# Patient Record
Sex: MALE | Race: WHITE | NOT HISPANIC OR LATINO | Employment: OTHER | URBAN - METROPOLITAN AREA
[De-identification: names, ages, dates, MRNs, and addresses within clinical notes are randomized per-mention and may not be internally consistent; named-entity substitution may affect disease eponyms.]

---

## 2021-05-19 ENCOUNTER — CONVERSION ENCOUNTER (OUTPATIENT)
Dept: FAMILY MEDICINE CLINIC | Facility: CLINIC | Age: 81
End: 2021-05-19

## 2021-05-19 ENCOUNTER — OFFICE VISIT CONVERTED (OUTPATIENT)
Dept: FAMILY MEDICINE CLINIC | Facility: CLINIC | Age: 81
End: 2021-05-19
Attending: FAMILY MEDICINE

## 2021-05-19 LAB
AMPHET UR QL CFM: NEGATIVE
BARBITURATES UR QL: NEGATIVE
BENZODIAZ UR QL SCN: NEGATIVE
CONV AMP/METHAMP UR: NEGATIVE
CONV COCAINE, UR: NEGATIVE
MDMA UR QL SCN: NEGATIVE
METHADONE UR QL SCN: NEGATIVE
OPIATES UR QL SCN: NEGATIVE
OXYCODONE UR QL SCN: NEGATIVE
PCP UR QL: NEGATIVE
THC SERPLBLD CFM-MCNC: NEGATIVE NG/ML

## 2021-05-20 ENCOUNTER — HOSPITAL ENCOUNTER (OUTPATIENT)
Dept: FAMILY MEDICINE CLINIC | Facility: CLINIC | Age: 81
Discharge: HOME | End: 2021-05-20
Attending: FAMILY MEDICINE

## 2021-05-20 LAB
ALBUMIN SERPL-MCNC: 4.3 G/DL (ref 3.5–5)
ALBUMIN/GLOB SERPL: 1.4 {RATIO} (ref 1.4–2.6)
ALP SERPL-CCNC: 78 U/L (ref 56–155)
ALT SERPL-CCNC: 38 U/L (ref 10–40)
ANION GAP SERPL CALC-SCNC: 17 MMOL/L (ref 8–19)
AST SERPL-CCNC: 24 U/L (ref 15–50)
BASOPHILS # BLD AUTO: 0.09 10*3/UL (ref 0–0.2)
BASOPHILS NFR BLD AUTO: 1.4 % (ref 0–3)
BILIRUB SERPL-MCNC: 0.81 MG/DL (ref 0.2–1.3)
BUN SERPL-MCNC: 13 MG/DL (ref 5–25)
BUN/CREAT SERPL: 13 {RATIO} (ref 6–20)
CALCIUM SERPL-MCNC: 10 MG/DL (ref 8.7–10.4)
CHLORIDE SERPL-SCNC: 105 MMOL/L (ref 99–111)
CONV ABS IMM GRAN: 0.01 10*3/UL (ref 0–0.2)
CONV CO2: 26 MMOL/L (ref 22–32)
CONV IMMATURE GRAN: 0.2 % (ref 0–1.8)
CONV TOTAL PROTEIN: 7.3 G/DL (ref 6.3–8.2)
CREAT UR-MCNC: 1.01 MG/DL (ref 0.7–1.2)
DEPRECATED RDW RBC AUTO: 46.8 FL (ref 35.1–43.9)
EOSINOPHIL # BLD AUTO: 0.32 10*3/UL (ref 0–0.7)
EOSINOPHIL # BLD AUTO: 5 % (ref 0–7)
ERYTHROCYTE [DISTWIDTH] IN BLOOD BY AUTOMATED COUNT: 13.2 % (ref 11.6–14.4)
EST. AVERAGE GLUCOSE BLD GHB EST-MCNC: 120 MG/DL
FSH SERPL-ACNC: 13.9 M[IU]/ML
GFR SERPLBLD BASED ON 1.73 SQ M-ARVRAT: >60 ML/MIN/{1.73_M2}
GLOBULIN UR ELPH-MCNC: 3 G/DL (ref 2–3.5)
GLUCOSE SERPL-MCNC: 114 MG/DL (ref 70–99)
HBA1C MFR BLD: 5.8 % (ref 3.5–5.7)
HCT VFR BLD AUTO: 41.5 % (ref 42–52)
HGB BLD-MCNC: 14.2 G/DL (ref 14–18)
LH SERPL-ACNC: 16.2 M[IU]/ML
LYMPHOCYTES # BLD AUTO: 2.18 10*3/UL (ref 1–5)
LYMPHOCYTES NFR BLD AUTO: 33.9 % (ref 20–45)
MCH RBC QN AUTO: 32.6 PG (ref 27–31)
MCHC RBC AUTO-ENTMCNC: 34.2 G/DL (ref 33–37)
MCV RBC AUTO: 95.4 FL (ref 80–96)
MONOCYTES # BLD AUTO: 0.6 10*3/UL (ref 0.2–1.2)
MONOCYTES NFR BLD AUTO: 9.3 % (ref 3–10)
NEUTROPHILS # BLD AUTO: 3.23 10*3/UL (ref 2–8)
NEUTROPHILS NFR BLD AUTO: 50.2 % (ref 30–85)
NRBC CBCN: 0 % (ref 0–0.7)
OSMOLALITY SERPL CALC.SUM OF ELEC: 299 MOSM/KG (ref 273–304)
PLATELET # BLD AUTO: 225 10*3/UL (ref 130–400)
PMV BLD AUTO: 11.7 FL (ref 9.4–12.4)
POTASSIUM SERPL-SCNC: 4.2 MMOL/L (ref 3.5–5.3)
PSA SERPL-MCNC: 1.92 NG/ML (ref 0–4)
RBC # BLD AUTO: 4.35 10*6/UL (ref 4.7–6.1)
SODIUM SERPL-SCNC: 144 MMOL/L (ref 135–147)
T4 FREE SERPL-MCNC: 1.1 NG/DL (ref 0.9–1.8)
TSH SERPL-ACNC: 1.47 M[IU]/L (ref 0.27–4.2)
WBC # BLD AUTO: 6.43 10*3/UL (ref 4.8–10.8)

## 2021-05-21 LAB
25(OH)D3 SERPL-MCNC: 36.8 NG/ML (ref 30–100)
TESTOST SERPL-MCNC: 81 NG/DL (ref 193–740)

## 2021-05-24 ENCOUNTER — HOSPITAL ENCOUNTER (OUTPATIENT)
Dept: GENERAL RADIOLOGY | Facility: HOSPITAL | Age: 81
Discharge: HOME | End: 2021-05-24
Attending: FAMILY MEDICINE

## 2021-06-05 NOTE — H&P
"   History and Physical      Patient Name: Steve Bryson Jr, Jr.   Patient ID: [id number]   Sex: Male   YOB: 1940    Referring Provider: Shamika BARGER    Visit Date: May 19, 2021    Provider: Ok Moore DO   [place] [facility] [place]   [place] Address: [address] [place] [address], [address]  [place], [place]  [id number]   Location Phone: [phone]          Chief Complaint  · establish care  · med refills      History Of Present Illness  Steve Bryson Jr, Jr. is a 80 year old /White male who presents for evaluation and treatment of:      Patient presents today to establish care.  He was previously seen by Dr. Olguin although patient endorses that he was never actually seen by him and just had a telephone visit.  His medical conditions include hypertension, hyperlipidemia, chronic thoracic and low back pain, vitamin D deficiency, BPH, osteoporosis, seasonal allergies, right knee pain/arthritis, as well as a previous right thyroidectomy due to having a \"lump\".  Patient was unsure about the details of this.  Originally told me that it was not involved with his thyroid although I did look the records and he did have a pathology report from 2008 that showed that he had a right thyroid lobe excision showing benign lobe of the thyroid gland demonstrating a follicular adenoma with degenerative changes.  There are also multiple goitrous nodules/nodular hyperplasia with no parathyroid gland tissue identified.  This is per surgical pathology report.  I would like to note that the report is listed in draft format still from 2008.  The surgeon was Dr. Jayden Garcia.  Discharge summary confirms discussion of benign follicular adenoma per Dr. Garcia.  Again, this was in 2008.  I had a chance to review some previous labs.  It appears that his TSH back in 2008 and 2009 was very low.  His most recent thyroid profile that I see on here from 2011 showed his TSH was within normal range at " 1.130.  He does not take any thyroid hormone supplementation.  I did discuss with him having these labs repeated.  His free T4 level has been within normal range.  This also includes normal ranges for free T3.  Labs have shown that he has had elevated hemoglobin levels with a hematocrit as high as 59.6 back in 2013.  Most recently in 2018 the hematocrit was still 53.5 with a hemoglobin being 18.3.  I asked him at this point about testosterone supplementation.  He reports he was taking up until a couple months ago 200 mg of testosterone once a month.  He is unsure but it does not sound like there has been any laboratory follow-up for this.  I discussed with patient my concern due to elevated hemoglobin levels and taking testosterone which she is requesting that I take over care for.  I discussed with him getting labs to further assess this which he is agreeable to doing.  He is also been noted to have high calcium levels with the calcium being 10.6.  He did have a PTH level checked back in 2011 which was 36.1.  Calcium was most recently elevated back in 2011.  Again since a lot of time is passed I discussed with patient need for follow-up labs.  He takes gabapentin for chronic low back pain and thoracic back pain.  He did have a thoracic spine MRI done in 2001 which showed possible recent compressions and lower thoracic spine at T7 and T8 and possible mild compression of the upper thoracic vertebra around the level of T4.  He does need some plain films to follow-up.  He has tolerated the gabapentin and it has helped with his low back pain.  I discussed with him continuing for now.  Parag, UDS, and controlled substance agreement have been reviewed today, they are updated and they are appropriate.  Him and his wife live in [place] half the time due to wanting to avoid the cold weather.  He does get his controlled substances including testosterone and gabapentin filled [facility].  His other  medications he picks up at an [employer] that is close by.  We discussed working on trying to make accommodations for his lifestyle.  He does have osteoporosis.  Most recent assessment of his bone density per hospital record was back in 2005 showing osteoporosis in the lumbar spine as well as osteopenia in the hips.  Discussed need for follow-up.  Depression screening has been reviewed and it is negative.       Past Medical History  BPH; BPH with Urinary Obstruction; Epididymal Nodule; High blood pressure; High cholesterol; Hypogonadism (Testicular Failure)         Past Surgical History  Knee replacement, right; Knee surgery; Mole Removal; Thyroidectomy-partial         Medication List  aspirin oral; atorvastatin 20 mg oral tablet; Calcium 500 oral; Calcium 500 + D 500 mg(1,250mg) -400 unit oral tablet; cetirizine 10 mg oral tablet; finasteride 5 mg oral tablet; Fish Oil oral; Flonase Allergy Relief 50 mcg/actuation nasal spray,suspension; Fosamax 70 mg oral tablet; gabapentin 300 mg oral capsule; lisinopril 20 mg oral tablet; metformin 1,000 mg oral tablet; tamsulosin 0.4 mg oral capsule,extended release 24hr; testosterone cypionate 200 mg/mL intramuscular oil; Viagra 100 mg oral tablet; Vitamin D3 25 mcg (1,000 unit) oral tablet; Zantac oral         Allergy List  NO KNOWN DRUG ALLERGIES       Allergies Reconciled  Family Medical History  *Non Contributory         Social History  Tobacco (Former)         Review of Systems     Gen: Denies any fever, chills, or weight changes  HEENT: Seasonal allergies  Musculoskeletal: Chronic thoracic and low back pain  Endocrine: Fatigue, low sex drive, weakness.  Extremities: Denies edema  Psychiatric: Denies any changes in mood or affect  Neurologic: Denies any deficits  Skin: Denies any rashes       Vitals  Date Time BP Position Site L\R Cuff Size HR RR TEMP (F) WT  HT  BMI kg/m2 BSA m2 O2 Sat FR L/min FiO2 HC       05/19/2021 08:47 /82 Sitting    92 - R  98.4 227lbs  "3oz 5'  8\" 34.54 2.22 97 %            Physical Examination     General: AAO 3, no acute distress, pleasant  HEENT: Normocephalic, atraumatic, no discharge in the eyes, no discharge from the nose, no oropharyngeal erythema or exudates, and TMs intact bilaterally with no erythema, no cervical tenderness or lymphadenopathy  Cardiovascular: Regular rate and rhythm without appreciable murmur  Respiratory: Clear to auscultation bilaterally no RRW  Gastrointestinal: Soft nontender nondistended with bowel sounds present  extremities: Mild, nonpitting edema in the lower extremities  Neurologic: CN II through XII grossly intact   Psychiatric: Normal mood and affect           Results  · In-Office Procedures  o Lab procedure  § IOP - Urine Drug Screen In-House [facility] (70873)   § Amphetamines Ur Ql: Negative   § Barbiturates Ur Ql: Negative   § Buprenorphine+Nor Ur Ql Scn: Negative   § Benzodiaz Ur Ql: Negative   § Cocaine Ur Ql: Negative   § Methadone Ur Ql: Negative   § Methamphet Ur Ql: Negative   § MDMA Ur Ql Scn: Negative   § Opiates Ur Ql: Negative   § Oxycodone Ur Ql: Negative   § PCP Ur Ql: Negative   § THC Ur Ql: Negative   § Temp in Range?: Within/Acceptable   § Control Seen?: Yes       Assessment  · BPH (benign prostatic hyperplasia)     600.00/N40.0  · Osteoporosis     733.00/M81.0  · Thoracic back pain     724.1/M54.6  · Vitamin D deficiency     268.9/E55.9  · Screening for depression     V79.0/Z13.89  · Low back pain     724.2/M54.5  · Hypogonadism in male     257.2/E29.1  · Low testosterone     790.99/R79.89  · Elevated hemoglobin     282.7/D58.2  · Hypercalcemia     275.42/E83.52  · Hyperthyroidism     242.90/E05.90  · Abnormal glucose     790.29/R73.09  · Hypertension     401.9/I10  · Medication monitoring encounter     V58.83/Z51.81  · Follicular adenoma of thyroid gland, right     226/D34  · Prediabetes     790.29/R73.03      Plan  · Orders  o CBC with Auto Diff [facility] (95885) - 282.7/D58.2, V58.83/Z51.81 - " 05/19/2021  o CMP [facility] (12091) - 275.42/E83.52, 401.9/I10, V58.83/Z51.81 - 05/19/2021  o Hgb A1c [facility] (71024) - 790.29/R73.09 - 05/19/2021  o Thyroid Profile (THYII, 06106, 58579) - 242.90/E05.90, V58.83/Z51.81 - 05/19/2021  o Vitamin D (25-Hydroxy) Level (10896) - 268.9/E55.9 - 05/19/2021  o ACO-18: Negative screen for clinical depression using a standardized tool () - V79.0/Z13.89 - 05/19/2021  o ACO-13: Fall Risk Screening with no falls in past year or only one fall without injury in the past year (1101F) - - 05/19/2021  o ACO - Advance Care Plan or Surrogate Decision Maker documented in EMR (1123F) - - 05/19/2021  o ACO-39: Current medications updated and reviewed (1159F, ) - - 05/19/2021  o DEXA Bone Density, 1 or more sites, axial skeleton [facility] (18382) - 733.00/M81.0 - 05/19/2021  o Xray thoracic spine 3 views [facility] (62237) - 724.1/M54.6 - 05/19/2021  o Lumbar Spine xray complete [facility] (98441) - 724.2/M54.5 - 05/19/2021  o PSA ultrasensitive DIAGNOSTIC [facility] (36917) - 790.99/R79.89, 282.7/D58.2, 600.00/N40.0, V58.83/Z51.81 - 05/19/2021  o SHBG (sex hormone binding globulin) (79154) - 257.2/E29.1, 790.99/R79.89 - 05/19/2021  o LH (01072) - 257.2/E29.1, 790.99/R79.89, V58.83/Z51.81 - 05/19/2021  o FSH level (66096) - 257.2/E29.1, 790.99/R79.89, V58.83/Z51.81 - 05/19/2021  o PSA ultrasensitive DIAGNOSTIC [facility] (96624) - 790.99/R79.89, 282.7/D58.2, V58.83/Z51.81 - 05/19/2021  · Medications  o atorvastatin 20 mg oral tablet   SIG: take 1 tablet (20 mg) by oral route once daily   DISP: (90) Tablet with 3 refills  Prescribed on 05/19/2021     o Calcium 500 + D 500 mg(1,250mg) -400 unit oral tablet   SIG: take 1 tablet by oral route QD   DISP: (90) Tablet with 3 refills  Prescribed on 05/19/2021     o cetirizine 10 mg oral tablet   SIG: take 1 tablet (10 mg) by oral route once daily   DISP: (90) Tablet with 3 refills  Prescribed on 05/19/2021     o Flonase Allergy Relief 50 mcg/actuation  nasal spray,suspension   SIG: spray 2 sprays in each nostril by intranasal route once daily   DISP: (3) Bottle with 3 refills  Prescribed on 05/19/2021     o Fosamax 70 mg oral tablet   SIG: take 1 tablet PO once weekly in the morning, at least 30 min before first food, beverage, or medication of day   DISP: (13) Tablet with 3 refills  Prescribed on 05/19/2021     o gabapentin 600 mg oral tablet   SIG: take 1 tablet (600 mg) by oral route 3 times per day   DISP: (90) Tablet with 1 refills  Prescribed on 05/19/2021     o lisinopril 20 mg oral tablet   SIG: take 1 tablet (20 mg) by oral route once daily   DISP: (90) Tablet with 3 refills  Prescribed on 05/19/2021     o metformin 1,000 mg oral tablet   SIG: take 1 tablet (1,000 mg) by oral route 2 times per day with morning and evening meals   DISP: (180) Tablet with 3 refills  Prescribed on 05/19/2021     o Vitamin D3 25 mcg (1,000 unit) oral tablet   SIG: take 1 tablet by oral route QD   DISP: (90) Tablet with 3 refills  Prescribed on 05/19/2021     o aspirin 81 mg oral tablet,delayed release (DR/EC)   SIG: take 1 tablet (81 mg) by oral route once daily   DISP: (90) Tablet with 3 refills  Adjusted on 05/19/2021     o finasteride 5 mg oral tablet   SIG: take 1 tablet (5 mg) by oral route once daily for 90 days   DISP: (90) Tablet with 3 refills  Refilled on 05/19/2021     o tamsulosin 0.4 mg oral capsule,extended release 24hr   SIG: take 1 capsule (0.4 mg) by oral route once daily 1/2 hour following the same meal each day for 90 days   DISP: (90) Capsule with 3 refills  Refilled on 05/19/2021     o Zantac oral   SIG: ---   DISP: # 0 with 0 refills  Discontinued on 05/19/2021     · Instructions  o Depression Screen completed and scanned into the EMR under the designated folder within the patient's documents.  o Today's PHQ-9 result is _0__  o Discussed the risk and benefits of the use of controlled substances with the patient, including the risk of tolerance and drug  dependence. The patient has been counseled on the need to have an exit strategy, including potentially discontinuing the use of controlled substances.  o Patient was educated/instructed on their diagnosis, treatment and medications prior to discharge from the clinic today.  o Patient instructed to seek medical attention urgently for new or worsening symptoms.  o Call the office with any concerns or questions.  o I have numerous unanswered questions today that will be further clarified once patient has had labs done. He does have an elevated hemoglobin. I will not prescribe him testosterone at this time due to increased risk of thrombotic events and stroke. Patient verbalized understanding. Plan to reassess him in 1 month after labs have been completed. Gabapentin has been refilled today. Parag, UDS, and controlled subs agreement have been reviewed and they are appropriate. We will get updated thoracic and lumbar plain films. I will also get a DEXA scan to reassess osteoporosis. Plan to also reassess previous lab abnormalities including abnormalities with TSH as well as calcium. Medications have been refilled today. Patient instructed to call the office with any questions or concerns. Patient verbalized understanding and is in agreement with treatment and management plan. I spent 47 minutes on medical discussion with patient.  · Disposition  o Follow Up in 1 month.            Electronically Signed by: Ok Moore DO -Author on May 19, 2021 12:22:56 PM

## 2021-06-21 ENCOUNTER — TELEPHONE (OUTPATIENT)
Dept: FAMILY MEDICINE CLINIC | Facility: CLINIC | Age: 81
End: 2021-06-21

## 2021-06-21 NOTE — TELEPHONE ENCOUNTER
Called patient and he verified that the paper is a letter than needs to state that he can't do jury duty due to permanent health conditions. Please advise.

## 2021-06-21 NOTE — TELEPHONE ENCOUNTER
Caller: Steve Bryson    Relationship: Self    Best call back number: [phone]    What form or medical record are you requesting: PAPER IN ORDER TO NOT HAVE TO GO TO [employer] DUE TO HIS HEALTH CONDITIONS  Who is requesting this form or medical record from you: [employer]    How would you like to receive the form or medical records (pick-up, mail, fax):   If fax, what is the fax number:  If mail, what is the address:   If pick-up, provide patient with address and location details    Timeframe paperwork needed: BY Friday 06/25    Additional notes: PATIENT IS WANTING TO KNOW IF A PAPER CAN BE COMPLETED SO HE DOES NOT HAVE TO ATTEND [employer]. PATIENTS WIFE HAS AN APPOINTMENT TOMORROW AND PATIENT IS WANTING TO KNOW IF IT IS POSSIBLE TO PICK THIS UP THEN. PLEASE ADVISE

## 2021-06-21 NOTE — TELEPHONE ENCOUNTER
Please contact patient and inform that I have written a letter that is available for him to  upfront.  Thank you.

## 2021-06-28 ENCOUNTER — OFFICE VISIT (OUTPATIENT)
Dept: FAMILY MEDICINE CLINIC | Facility: CLINIC | Age: 81
End: 2021-06-28

## 2021-06-28 VITALS
DIASTOLIC BLOOD PRESSURE: 76 MMHG | TEMPERATURE: 98 F | OXYGEN SATURATION: 96 % | HEART RATE: 97 BPM | BODY MASS INDEX: 33.8 KG/M2 | HEIGHT: 68 IN | SYSTOLIC BLOOD PRESSURE: 122 MMHG | WEIGHT: 223 LBS

## 2021-06-28 DIAGNOSIS — E89.0 HISTORY OF PARTIAL THYROIDECTOMY: ICD-10-CM

## 2021-06-28 DIAGNOSIS — M54.50 LOW BACK PAIN, UNSPECIFIED BACK PAIN LATERALITY, UNSPECIFIED CHRONICITY, UNSPECIFIED WHETHER SCIATICA PRESENT: ICD-10-CM

## 2021-06-28 DIAGNOSIS — R79.89 LOW TESTOSTERONE IN MALE: ICD-10-CM

## 2021-06-28 DIAGNOSIS — M81.0 OSTEOPOROSIS, UNSPECIFIED OSTEOPOROSIS TYPE, UNSPECIFIED PATHOLOGICAL FRACTURE PRESENCE: ICD-10-CM

## 2021-06-28 DIAGNOSIS — G89.29 CHRONIC THORACIC BACK PAIN, UNSPECIFIED BACK PAIN LATERALITY: ICD-10-CM

## 2021-06-28 DIAGNOSIS — S32.040D COMPRESSION FRACTURE OF L4 VERTEBRA WITH ROUTINE HEALING, SUBSEQUENT ENCOUNTER: ICD-10-CM

## 2021-06-28 DIAGNOSIS — Z51.81 MEDICATION MONITORING ENCOUNTER: ICD-10-CM

## 2021-06-28 DIAGNOSIS — M54.6 CHRONIC THORACIC BACK PAIN, UNSPECIFIED BACK PAIN LATERALITY: ICD-10-CM

## 2021-06-28 DIAGNOSIS — R73.03 PREDIABETES: ICD-10-CM

## 2021-06-28 DIAGNOSIS — E29.1 HYPOGONADISM IN MALE: Primary | ICD-10-CM

## 2021-06-28 DIAGNOSIS — S22.060G COMPRESSION FRACTURE OF T8 VERTEBRA WITH DELAYED HEALING, SUBSEQUENT ENCOUNTER: ICD-10-CM

## 2021-06-28 DIAGNOSIS — M89.9 BONE LESION: ICD-10-CM

## 2021-06-28 PROBLEM — S32.000D COMPRESSION FRACTURE OF LUMBAR VERTEBRA WITH ROUTINE HEALING: Status: ACTIVE | Noted: 2021-06-28

## 2021-06-28 PROBLEM — S22.000G COMPRESSION FRACTURE OF THORACIC VERTEBRA WITH DELAYED HEALING: Status: ACTIVE | Noted: 2021-06-28

## 2021-06-28 PROCEDURE — 99214 OFFICE O/P EST MOD 30 MIN: CPT | Performed by: FAMILY MEDICINE

## 2021-06-28 RX ORDER — RANITIDINE 150 MG/1
TABLET ORAL
COMMUNITY
End: 2021-06-28

## 2021-06-28 RX ORDER — FLUTICASONE PROPIONATE 50 MCG
2 SPRAY, SUSPENSION (ML) NASAL DAILY
Qty: 48 G | Refills: 3 | Status: SHIPPED | OUTPATIENT
Start: 2021-06-28 | End: 2022-04-18 | Stop reason: SDUPTHER

## 2021-06-28 RX ORDER — SILDENAFIL 50 MG/1
TABLET, FILM COATED ORAL
COMMUNITY
End: 2021-06-28

## 2021-06-28 RX ORDER — HYDROCHLOROTHIAZIDE 12.5 MG/1
12.5 TABLET ORAL DAILY
COMMUNITY
End: 2022-04-18 | Stop reason: SDUPTHER

## 2021-06-28 RX ORDER — TAMSULOSIN HYDROCHLORIDE 0.4 MG/1
1 CAPSULE ORAL NIGHTLY
COMMUNITY
End: 2021-08-09 | Stop reason: SDUPTHER

## 2021-06-28 RX ORDER — TESTOSTERONE 10 MG/.5G
40 GEL, METERED TOPICAL DAILY
Qty: 60 G | Refills: 2 | Status: SHIPPED | OUTPATIENT
Start: 2021-06-28 | End: 2021-08-11 | Stop reason: SDUPTHER

## 2021-06-28 RX ORDER — GABAPENTIN 600 MG/1
TABLET ORAL
COMMUNITY
Start: 2021-06-16 | End: 2021-07-03 | Stop reason: HOSPADM

## 2021-06-28 RX ORDER — ASPIRIN 81 MG/1
TABLET, CHEWABLE ORAL
COMMUNITY
End: 2021-06-28

## 2021-06-28 RX ORDER — ALENDRONATE SODIUM 70 MG/1
TABLET ORAL
COMMUNITY
End: 2021-06-28 | Stop reason: SDUPTHER

## 2021-06-28 RX ORDER — ALENDRONATE SODIUM 70 MG/1
70 TABLET ORAL
Qty: 13 TABLET | Refills: 3 | Status: SHIPPED | OUTPATIENT
Start: 2021-06-28 | End: 2022-04-18 | Stop reason: SDUPTHER

## 2021-06-28 RX ORDER — ASPIRIN 81 MG/1
TABLET ORAL
COMMUNITY
End: 2021-06-28 | Stop reason: SDUPTHER

## 2021-06-28 RX ORDER — ATORVASTATIN CALCIUM 20 MG/1
TABLET, FILM COATED ORAL
COMMUNITY
End: 2021-06-28 | Stop reason: SDUPTHER

## 2021-06-28 RX ORDER — CETIRIZINE HYDROCHLORIDE 10 MG/1
10 TABLET ORAL DAILY
Qty: 90 TABLET | Refills: 3 | Status: SHIPPED | OUTPATIENT
Start: 2021-06-28 | End: 2022-04-18 | Stop reason: SDUPTHER

## 2021-06-28 RX ORDER — LISINOPRIL 20 MG/1
20 TABLET ORAL DAILY
COMMUNITY
End: 2021-08-06 | Stop reason: SDUPTHER

## 2021-06-28 RX ORDER — TESTOSTERONE CYPIONATE 200 MG/ML
INJECTION, SOLUTION INTRAMUSCULAR
COMMUNITY
End: 2021-06-28

## 2021-06-28 RX ORDER — OMEGA-3 FATTY ACIDS CAP DELAYED RELEASE 1000 MG 1000 MG
CAPSULE DELAYED RELEASE ORAL
COMMUNITY
End: 2021-06-28

## 2021-06-28 RX ORDER — FAMOTIDINE 20 MG
TABLET ORAL
COMMUNITY
End: 2021-07-03 | Stop reason: HOSPADM

## 2021-06-28 RX ORDER — ATORVASTATIN CALCIUM 20 MG/1
10 TABLET, FILM COATED ORAL NIGHTLY
Qty: 90 TABLET | Refills: 3 | Status: SHIPPED | OUTPATIENT
Start: 2021-06-28 | End: 2021-07-03 | Stop reason: HOSPADM

## 2021-06-28 RX ORDER — ASPIRIN 81 MG/1
81 TABLET ORAL DAILY
Qty: 90 TABLET | Refills: 3 | Status: SHIPPED | OUTPATIENT
Start: 2021-06-28 | End: 2021-08-30 | Stop reason: SDUPTHER

## 2021-06-28 RX ORDER — GABAPENTIN 600 MG/1
600 TABLET ORAL 3 TIMES DAILY
COMMUNITY
End: 2021-08-06 | Stop reason: SDUPTHER

## 2021-06-28 RX ORDER — CETIRIZINE HYDROCHLORIDE 10 MG/1
TABLET ORAL
COMMUNITY
End: 2021-06-28 | Stop reason: SDUPTHER

## 2021-06-28 RX ORDER — INFLUENZA A VIRUS A/MICHIGAN/45/2015 X-275 (H1N1) ANTIGEN (FORMALDEHYDE INACTIVATED), INFLUENZA A VIRUS A/SINGAPORE/INFIMH-16-0019/2016 IVR-186 (H3N2) ANTIGEN (FORMALDEHYDE INACTIVATED), INFLUENZA B VIRUS B/PHUKET/3073/2013 ANTIGEN (FORMALDEHYDE INACTIVATED), AND INFLUENZA B VIRUS B/MARYLAND/15/2016 BX-69A ANTIGEN (FORMALDEHYDE INACTIVATED) 60; 60; 60; 60 UG/.7ML; UG/.7ML; UG/.7ML; UG/.7ML
INJECTION, SUSPENSION INTRAMUSCULAR
COMMUNITY
End: 2021-07-03 | Stop reason: HOSPADM

## 2021-06-28 RX ORDER — FLUTICASONE PROPIONATE 50 MCG
SPRAY, SUSPENSION (ML) NASAL
COMMUNITY
End: 2021-06-28 | Stop reason: SDUPTHER

## 2021-06-28 RX ORDER — MELATONIN
COMMUNITY
Start: 2021-05-19 | End: 2021-07-03 | Stop reason: HOSPADM

## 2021-06-28 RX ORDER — SULFAMETHOXAZOLE AND TRIMETHOPRIM 800; 160 MG/1; MG/1
TABLET ORAL
COMMUNITY
End: 2021-06-28

## 2021-06-28 RX ORDER — FINASTERIDE 5 MG/1
5 TABLET, FILM COATED ORAL DAILY
COMMUNITY
End: 2021-08-06 | Stop reason: SDUPTHER

## 2021-06-28 RX ORDER — MULTIVITAMIN WITH IRON
TABLET ORAL
COMMUNITY
End: 2021-06-28

## 2021-06-28 RX ORDER — ANASTROZOLE 1 MG/1
TABLET ORAL
COMMUNITY
End: 2021-06-28

## 2021-06-28 RX ORDER — RANITIDINE 150 MG/1
TABLET ORAL EVERY 12 HOURS SCHEDULED
COMMUNITY
End: 2021-06-28

## 2021-06-28 NOTE — PROGRESS NOTES
Chief Complaint  Low testosterone  Discuss labs  Thoracic and low back pain    Subjective          Steve Bryson presents to [facility]  History of Present Illness  Patient presents today to follow-up from his initial office visit when I saw him on 5/19/2021.  He has had issues with low testosterone but has been off of testosterone the past couple months.  He does have issues with fatigue and low sex drive and decreased muscle mass/tone.  He reports previously taking 200 mg of testosterone monthly.  He previously has had some high levels of hemoglobin at 18.3 and hematocrit at 53.5.  He had recent labs done which showed a hematocrit of 41.5 and hemoglobin of 14.2.  I did check his testosterone level which was low at 81.  Vitamin D level was adequate.  CMP showed slightly elevated glucose at 114.  PSA was within normal limits at 1.92.  His thyroid profile was within normal limits.  FSH slightly elevated at 13.9 which would be expected given history of hypogonadism.  Upper limits being 12.4.  LH was within normal limits at 16.2.  His A1c was 5.8%.  He reports not being formally diagnosed with diabetes but he has been told previously that he has prediabetic levels.  Last time we spoke we discussed issues with thoracic and low back pain.  He does take gabapentin which does help out for this.  He does have osteoporosis and takes Fortesta.  He does need a DEXA scan so I have ordered this as well.  He needs medications refilled today.  Unfortunately the thoracic spine x-ray showed probably old moderate to severe compression fractures at T8-T9.  He also has a moderate to severe compression fracture of indeterminate age at L4.  It was also noted on this x-ray that he had a focal area of increased density overlying the lateral aspect of the right iliac wing measuring 5.2 cm x 3.6 in size of uncertain etiology which might be an artifact or calcification.  It was recommended to correlate  "with any previous films of the abdomen or pelvis or CT.  Unfortunately there are no prior imaging to compare at this time.  Plan to get a follow-up x-ray of the pelvis to further evaluate.  I discussed starting the patient on testosterone.  He previously was taking the injection 200 mg once a month.  Discussed with him that this would be spaced out a little bit too much.  He previously was on once weekly however his former primary care preferred once a month testosterone over weekly or biweekly.  I discussed starting him on Fortesta.  Risk and benefits discussed including increased risk of the hematocrit rising and increased risk of stroke.  Patient verbalized understanding.  Objective   Vital Signs:   /76   Pulse 97   Temp 98 °F (36.7 °C)   Ht 172.7 cm (68\")   Wt 101 kg (223 lb)   SpO2 96%   BMI 33.91 kg/m²     Physical Exam   General: AAO ×3, no acute distress, pleasant  HEENT: Normocephalic, atraumatic  Cardiovascular: Regular rate and rhythm without appreciable murmur  Respiratory: Clear to auscultation bilaterally no RRW  Gastrointestinal: Soft nontender nondistended with bowel sounds present  extremities: No edema  Neurologic: CN II through XII grossly intact   Psychiatric: Normal mood and affect  Result Review :                 Assessment and Plan    Diagnoses and all orders for this visit:    1. Hypogonadism in male (Primary)  -     Testosterone 10 MG/ACT (2%) gel; Place 40 mg on the skin as directed by provider Daily.  Dispense: 60 g; Refill: 2  -     CBC & Differential; Future  -     Testosterone; Future    2. Osteoporosis, unspecified osteoporosis type, unspecified pathological fracture presence  -     DEXA Bone Density Axial; Future    3. History of partial thyroidectomy    4. Prediabetes    5. Low testosterone in male    6. Chronic thoracic back pain, unspecified back pain laterality  -     Ambulatory Referral to Neurosurgery    7. Low back pain, unspecified back pain laterality, unspecified " chronicity, unspecified whether sciatica present  -     Ambulatory Referral to Neurosurgery    8. Compression fracture of T8 vertebra with delayed healing, subsequent encounter  -     Ambulatory Referral to Neurosurgery    9. Compression fracture of L4 vertebra with routine healing, subsequent encounter  -     Ambulatory Referral to Neurosurgery    10. Medication monitoring encounter  -     CBC & Differential; Future  -     Testosterone; Future    11. Bone lesion  -     XR Pelvis 3+ View; Future    Other orders  -     aspirin (aspirin) 81 MG EC tablet; Take 1 tablet by mouth Daily.  Dispense: 90 tablet; Refill: 3  -     cetirizine (zyrTEC) 10 MG tablet; Take 1 tablet by mouth Daily.  Dispense: 90 tablet; Refill: 3  -     fluticasone (FLONASE) 50 MCG/ACT nasal spray; 2 sprays into the nostril(s) as directed by provider Daily.  Dispense: 48 g; Refill: 3  -     atorvastatin (LIPITOR) 20 MG tablet; Take 0.5 tablets by mouth Every Night.  Dispense: 90 tablet; Refill: 3  -     alendronate (FOSAMAX) 70 MG tablet; Take 1 tablet by mouth Every 7 (Seven) Days.  Dispense: 13 tablet; Refill: 3    Discussed starting him on Fortesta.  Risk and benefits were discussed with patient at length.  Parag, UDS, and controlled subs agreement have been reviewed and they are appropriate.  I will set him up to see neurosurgery for thoracic and low back pain which are complicated by compression fractures.  He does have a calcification or possible artifact on the right iliac wing that measures 5.2 x 6.3 cm of uncertain etiology which may be artifact or calcification per radiology report.  I will get an x-ray of the pelvis to further evaluate.     See note for indication of controlled substance.  Parag and drug screen have been reviewed.  Controlled substance agreement signed and scanned into chart.  After discussion of risk and benefits of medication, I have determined the patient is suitable for Rx while demonstrating the ability to safely  follow and administer the medication plan.  Patient understands expectation that medication directions cannot be adjusted without a providers written approval.      I spent 39 minutes caring for Steve on this date of service. This time includes time spent by me in the following activities:reviewing tests, obtaining and/or reviewing a separately obtained history, counseling and educating the patient/family/caregiver, ordering medications, tests, or procedures, documenting information in the medical record and care coordination  Follow Up   Return in about 2 months (around 8/28/2021) for follow up on low testosterone.  Patient was given instructions and counseling regarding his condition or for health maintenance advice. Please see specific information pulled into the AVS if appropriate.

## 2021-07-02 ENCOUNTER — HOSPITAL ENCOUNTER (INPATIENT)
Facility: HOSPITAL | Age: 81
LOS: 1 days | Discharge: HOME | End: 2021-07-03
Attending: EMERGENCY MEDICINE | Admitting: INTERNAL MEDICINE

## 2021-07-02 ENCOUNTER — APPOINTMENT (OUTPATIENT)
Dept: GENERAL RADIOLOGY | Facility: HOSPITAL | Age: 81
End: 2021-07-02

## 2021-07-02 ENCOUNTER — APPOINTMENT (OUTPATIENT)
Dept: CT IMAGING | Facility: HOSPITAL | Age: 81
End: 2021-07-02

## 2021-07-02 DIAGNOSIS — I48.91 ATRIAL FIBRILLATION, NEW ONSET (HCC): Primary | ICD-10-CM

## 2021-07-02 DIAGNOSIS — R55 SYNCOPE, UNSPECIFIED SYNCOPE TYPE: ICD-10-CM

## 2021-07-02 LAB
ALBUMIN SERPL-MCNC: 4 G/DL (ref 3.5–5.2)
ALBUMIN/GLOB SERPL: 1.4 G/DL
ALP SERPL-CCNC: 85 U/L (ref 39–117)
ALT SERPL W P-5'-P-CCNC: 32 U/L (ref 1–41)
ANION GAP SERPL CALCULATED.3IONS-SCNC: 11.6 MMOL/L (ref 5–15)
AST SERPL-CCNC: 22 U/L (ref 1–40)
BACTERIA UR QL AUTO: ABNORMAL /HPF
BASOPHILS # BLD AUTO: 0.08 10*3/MM3 (ref 0–0.2)
BASOPHILS NFR BLD AUTO: 0.8 % (ref 0–1.5)
BILIRUB SERPL-MCNC: 0.6 MG/DL (ref 0–1.2)
BILIRUB UR QL STRIP: NEGATIVE
BUN SERPL-MCNC: 13 MG/DL (ref 8–23)
BUN/CREAT SERPL: 14 (ref 7–25)
CALCIUM SPEC-SCNC: 10.2 MG/DL (ref 8.6–10.5)
CHLORIDE SERPL-SCNC: 104 MMOL/L (ref 98–107)
CLARITY UR: CLEAR
CO2 SERPL-SCNC: 23.4 MMOL/L (ref 22–29)
COLOR UR: YELLOW
CREAT SERPL-MCNC: 0.93 MG/DL (ref 0.76–1.27)
DEPRECATED RDW RBC AUTO: 42.7 FL (ref 37–54)
EOSINOPHIL # BLD AUTO: 0.44 10*3/MM3 (ref 0–0.4)
EOSINOPHIL NFR BLD AUTO: 4.2 % (ref 0.3–6.2)
ERYTHROCYTE [DISTWIDTH] IN BLOOD BY AUTOMATED COUNT: 12.4 % (ref 12.3–15.4)
GFR SERPL CREATININE-BSD FRML MDRD: 79 ML/MIN/1.73
GLOBULIN UR ELPH-MCNC: 2.9 GM/DL
GLUCOSE BLDC GLUCOMTR-MCNC: 132 MG/DL (ref 70–130)
GLUCOSE SERPL-MCNC: 145 MG/DL (ref 65–99)
GLUCOSE UR STRIP-MCNC: NEGATIVE MG/DL
HCT VFR BLD AUTO: 39.5 % (ref 37.5–51)
HGB BLD-MCNC: 13.7 G/DL (ref 13–17.7)
HGB UR QL STRIP.AUTO: NEGATIVE
HOLD SPECIMEN: NORMAL
HOLD SPECIMEN: NORMAL
HYALINE CASTS UR QL AUTO: ABNORMAL /LPF
IMM GRANULOCYTES # BLD AUTO: 0.03 10*3/MM3 (ref 0–0.05)
IMM GRANULOCYTES NFR BLD AUTO: 0.3 % (ref 0–0.5)
KETONES UR QL STRIP: NEGATIVE
LEUKOCYTE ESTERASE UR QL STRIP.AUTO: NEGATIVE
LYMPHOCYTES # BLD AUTO: 1.51 10*3/MM3 (ref 0.7–3.1)
LYMPHOCYTES NFR BLD AUTO: 14.3 % (ref 19.6–45.3)
MAGNESIUM SERPL-MCNC: 1.7 MG/DL (ref 1.6–2.4)
MCH RBC QN AUTO: 32.4 PG (ref 26.6–33)
MCHC RBC AUTO-ENTMCNC: 34.7 G/DL (ref 31.5–35.7)
MCV RBC AUTO: 93.4 FL (ref 79–97)
MONOCYTES # BLD AUTO: 0.79 10*3/MM3 (ref 0.1–0.9)
MONOCYTES NFR BLD AUTO: 7.5 % (ref 5–12)
NEUTROPHILS NFR BLD AUTO: 7.68 10*3/MM3 (ref 1.7–7)
NEUTROPHILS NFR BLD AUTO: 72.9 % (ref 42.7–76)
NITRITE UR QL STRIP: NEGATIVE
NRBC BLD AUTO-RTO: 0 /100 WBC (ref 0–0.2)
PH UR STRIP.AUTO: 5.5 [PH] (ref 5–8)
PLATELET # BLD AUTO: 192 10*3/MM3 (ref 140–450)
PMV BLD AUTO: 10.9 FL (ref 6–12)
POTASSIUM SERPL-SCNC: 3.6 MMOL/L (ref 3.5–5.2)
PROT SERPL-MCNC: 6.9 G/DL (ref 6–8.5)
PROT UR QL STRIP: ABNORMAL
RBC # BLD AUTO: 4.23 10*6/MM3 (ref 4.14–5.8)
RBC # UR: ABNORMAL /HPF
REF LAB TEST METHOD: ABNORMAL
SODIUM SERPL-SCNC: 139 MMOL/L (ref 136–145)
SP GR UR STRIP: 1.02 (ref 1–1.03)
SQUAMOUS #/AREA URNS HPF: ABNORMAL /HPF
TROPONIN T SERPL-MCNC: <0.01 NG/ML (ref 0–0.03)
UROBILINOGEN UR QL STRIP: ABNORMAL
WBC # BLD AUTO: 10.53 10*3/MM3 (ref 3.4–10.8)
WBC UR QL AUTO: ABNORMAL /HPF
WHOLE BLOOD HOLD SPECIMEN: NORMAL

## 2021-07-02 PROCEDURE — 83735 ASSAY OF MAGNESIUM: CPT

## 2021-07-02 PROCEDURE — 80053 COMPREHEN METABOLIC PANEL: CPT

## 2021-07-02 PROCEDURE — 99284 EMERGENCY DEPT VISIT MOD MDM: CPT

## 2021-07-02 PROCEDURE — 93005 ELECTROCARDIOGRAM TRACING: CPT | Performed by: EMERGENCY MEDICINE

## 2021-07-02 PROCEDURE — 84484 ASSAY OF TROPONIN QUANT: CPT

## 2021-07-02 PROCEDURE — 93005 ELECTROCARDIOGRAM TRACING: CPT

## 2021-07-02 PROCEDURE — 85025 COMPLETE CBC W/AUTO DIFF WBC: CPT

## 2021-07-02 PROCEDURE — 71045 X-RAY EXAM CHEST 1 VIEW: CPT

## 2021-07-02 PROCEDURE — 93010 ELECTROCARDIOGRAM REPORT: CPT | Performed by: SPECIALIST

## 2021-07-02 PROCEDURE — 70450 CT HEAD/BRAIN W/O DYE: CPT

## 2021-07-02 PROCEDURE — 81001 URINALYSIS AUTO W/SCOPE: CPT | Performed by: EMERGENCY MEDICINE

## 2021-07-02 PROCEDURE — 82962 GLUCOSE BLOOD TEST: CPT

## 2021-07-02 RX ORDER — SODIUM CHLORIDE 0.9 % (FLUSH) 0.9 %
10 SYRINGE (ML) INJECTION AS NEEDED
Status: DISCONTINUED | OUTPATIENT
Start: 2021-07-02 | End: 2021-07-03 | Stop reason: HOSPADM

## 2021-07-02 RX ADMIN — SODIUM CHLORIDE 1000 ML: 9 INJECTION, SOLUTION INTRAVENOUS at 23:27

## 2021-07-03 ENCOUNTER — APPOINTMENT (OUTPATIENT)
Dept: CARDIOLOGY | Facility: HOSPITAL | Age: 81
End: 2021-07-03

## 2021-07-03 ENCOUNTER — READMISSION MANAGEMENT (OUTPATIENT)
Dept: CALL CENTER | Facility: HOSPITAL | Age: 81
End: 2021-07-03

## 2021-07-03 VITALS
OXYGEN SATURATION: 98 % | DIASTOLIC BLOOD PRESSURE: 82 MMHG | HEIGHT: 66 IN | RESPIRATION RATE: 20 BRPM | BODY MASS INDEX: 36.67 KG/M2 | TEMPERATURE: 97.5 F | WEIGHT: 228.18 LBS | SYSTOLIC BLOOD PRESSURE: 138 MMHG | HEART RATE: 91 BPM

## 2021-07-03 PROBLEM — I48.91 A-FIB: Status: ACTIVE | Noted: 2021-07-03

## 2021-07-03 LAB
ANION GAP SERPL CALCULATED.3IONS-SCNC: 11.6 MMOL/L (ref 5–15)
ASCENDING AORTA: 3.4 CM
BASOPHILS # BLD AUTO: 0.08 10*3/MM3 (ref 0–0.2)
BASOPHILS NFR BLD AUTO: 0.8 % (ref 0–1.5)
BH CV ECHO MEAS - AO ROOT DIAM: 3 CM
BH CV ECHO MEAS - EDV(MOD-SP2): 59 ML
BH CV ECHO MEAS - EDV(MOD-SP4): 71 ML
BH CV ECHO MEAS - EF(MOD-BP): 56 %
BH CV ECHO MEAS - ESV(MOD-SP2): 26.3 ML
BH CV ECHO MEAS - ESV(MOD-SP4): 32.5 ML
BH CV ECHO MEAS - IVSD: 1.1 CM
BH CV ECHO MEAS - LA DIMENSION(2D): 4.1 CM
BH CV ECHO MEAS - LAT PEAK E' VEL: 12.2 CM/SEC
BH CV ECHO MEAS - LVIDD: 3.4 CM
BH CV ECHO MEAS - LVIDS: 2.4 CM
BH CV ECHO MEAS - LVOT DIAM: 2 CM
BH CV ECHO MEAS - LVPWD: 1.2 CM
BH CV ECHO MEAS - MED PEAK E' VEL: 7.7 CM/SEC
BH CV ECHO MEAS - MV A MAX VEL: 38 CM/SEC
BH CV ECHO MEAS - MV DEC TIME: 203 MSEC
BH CV ECHO MEAS - MV E MAX VEL: 70 CM/SEC
BH CV ECHO MEAS - MV E/A: 1.8
BH CV ECHO MEAS - RAP SYSTOLE: 3 MMHG
BH CV ECHO MEAS - RVDD: 3 CM
BH CV ECHO MEAS - RVSP: 35 MMHG
BH CV ECHO MEAS - TR MAX PG: 32 MMHG
BH CV ECHO MEAS - TR MAX VEL: 281 CM/SEC
BH CV ECHO MEASUREMENTS AVERAGE E/E' RATIO: 7.04
BUN SERPL-MCNC: 11 MG/DL (ref 8–23)
BUN/CREAT SERPL: 11.8 (ref 7–25)
CALCIUM SPEC-SCNC: 10.4 MG/DL (ref 8.6–10.5)
CHLORIDE SERPL-SCNC: 105 MMOL/L (ref 98–107)
CO2 SERPL-SCNC: 25.4 MMOL/L (ref 22–29)
CREAT SERPL-MCNC: 0.93 MG/DL (ref 0.76–1.27)
DEPRECATED RDW RBC AUTO: 43.7 FL (ref 37–54)
EOSINOPHIL # BLD AUTO: 0.34 10*3/MM3 (ref 0–0.4)
EOSINOPHIL NFR BLD AUTO: 3.4 % (ref 0.3–6.2)
ERYTHROCYTE [DISTWIDTH] IN BLOOD BY AUTOMATED COUNT: 12.5 % (ref 12.3–15.4)
GFR SERPL CREATININE-BSD FRML MDRD: 79 ML/MIN/1.73
GLUCOSE SERPL-MCNC: 120 MG/DL (ref 65–99)
HCT VFR BLD AUTO: 40.9 % (ref 37.5–51)
HGB BLD-MCNC: 14.1 G/DL (ref 13–17.7)
IMM GRANULOCYTES # BLD AUTO: 0.03 10*3/MM3 (ref 0–0.05)
IMM GRANULOCYTES NFR BLD AUTO: 0.3 % (ref 0–0.5)
IVRT: 85 MSEC
LEFT ATRIUM VOLUME INDEX: 27 ML/M2
LYMPHOCYTES # BLD AUTO: 1.97 10*3/MM3 (ref 0.7–3.1)
LYMPHOCYTES NFR BLD AUTO: 19.5 % (ref 19.6–45.3)
MAGNESIUM SERPL-MCNC: 2 MG/DL (ref 1.6–2.4)
MAXIMAL PREDICTED HEART RATE: 142 BPM
MCH RBC QN AUTO: 32.9 PG (ref 26.6–33)
MCHC RBC AUTO-ENTMCNC: 34.5 G/DL (ref 31.5–35.7)
MCV RBC AUTO: 95.3 FL (ref 79–97)
MONOCYTES # BLD AUTO: 0.99 10*3/MM3 (ref 0.1–0.9)
MONOCYTES NFR BLD AUTO: 9.8 % (ref 5–12)
NEUTROPHILS NFR BLD AUTO: 6.67 10*3/MM3 (ref 1.7–7)
NEUTROPHILS NFR BLD AUTO: 66.2 % (ref 42.7–76)
NRBC BLD AUTO-RTO: 0 /100 WBC (ref 0–0.2)
PLATELET # BLD AUTO: 207 10*3/MM3 (ref 140–450)
PMV BLD AUTO: 11 FL (ref 6–12)
POTASSIUM SERPL-SCNC: 3.7 MMOL/L (ref 3.5–5.2)
RBC # BLD AUTO: 4.29 10*6/MM3 (ref 4.14–5.8)
SODIUM SERPL-SCNC: 142 MMOL/L (ref 136–145)
STRESS TARGET HR: 121 BPM
TROPONIN T SERPL-MCNC: <0.01 NG/ML (ref 0–0.03)
TROPONIN T SERPL-MCNC: <0.01 NG/ML (ref 0–0.03)
WBC # BLD AUTO: 10.08 10*3/MM3 (ref 3.4–10.8)

## 2021-07-03 PROCEDURE — 84484 ASSAY OF TROPONIN QUANT: CPT | Performed by: PHYSICIAN ASSISTANT

## 2021-07-03 PROCEDURE — 80048 BASIC METABOLIC PNL TOTAL CA: CPT | Performed by: PHYSICIAN ASSISTANT

## 2021-07-03 PROCEDURE — 93306 TTE W/DOPPLER COMPLETE: CPT | Performed by: SPECIALIST

## 2021-07-03 PROCEDURE — 99239 HOSP IP/OBS DSCHRG MGMT >30: CPT | Performed by: INTERNAL MEDICINE

## 2021-07-03 PROCEDURE — 99222 1ST HOSP IP/OBS MODERATE 55: CPT | Performed by: SPECIALIST

## 2021-07-03 PROCEDURE — 83735 ASSAY OF MAGNESIUM: CPT | Performed by: PHYSICIAN ASSISTANT

## 2021-07-03 PROCEDURE — 99223 1ST HOSP IP/OBS HIGH 75: CPT | Performed by: FAMILY MEDICINE

## 2021-07-03 PROCEDURE — 93306 TTE W/DOPPLER COMPLETE: CPT

## 2021-07-03 PROCEDURE — 85025 COMPLETE CBC W/AUTO DIFF WBC: CPT | Performed by: PHYSICIAN ASSISTANT

## 2021-07-03 PROCEDURE — 36415 COLL VENOUS BLD VENIPUNCTURE: CPT | Performed by: PHYSICIAN ASSISTANT

## 2021-07-03 RX ORDER — ACETAMINOPHEN 325 MG/1
650 TABLET ORAL EVERY 4 HOURS PRN
Status: DISCONTINUED | OUTPATIENT
Start: 2021-07-03 | End: 2021-07-03 | Stop reason: HOSPADM

## 2021-07-03 RX ORDER — ATORVASTATIN CALCIUM 10 MG/1
10 TABLET, FILM COATED ORAL DAILY
COMMUNITY
End: 2022-04-18 | Stop reason: SDUPTHER

## 2021-07-03 RX ORDER — SODIUM CHLORIDE 0.9 % (FLUSH) 0.9 %
10 SYRINGE (ML) INJECTION AS NEEDED
Status: DISCONTINUED | OUTPATIENT
Start: 2021-07-03 | End: 2021-07-03 | Stop reason: HOSPADM

## 2021-07-03 RX ORDER — CHOLECALCIFEROL (VITAMIN D3) 125 MCG
5 CAPSULE ORAL NIGHTLY PRN
Status: DISCONTINUED | OUTPATIENT
Start: 2021-07-03 | End: 2021-07-03 | Stop reason: HOSPADM

## 2021-07-03 RX ORDER — SODIUM CHLORIDE 0.9 % (FLUSH) 0.9 %
10 SYRINGE (ML) INJECTION EVERY 12 HOURS SCHEDULED
Status: DISCONTINUED | OUTPATIENT
Start: 2021-07-03 | End: 2021-07-03 | Stop reason: HOSPADM

## 2021-07-03 RX ORDER — METOPROLOL SUCCINATE 25 MG/1
25 TABLET, EXTENDED RELEASE ORAL EVERY 12 HOURS SCHEDULED
Status: DISCONTINUED | OUTPATIENT
Start: 2021-07-03 | End: 2021-07-03 | Stop reason: HOSPADM

## 2021-07-03 RX ORDER — ALUMINA, MAGNESIA, AND SIMETHICONE 2400; 2400; 240 MG/30ML; MG/30ML; MG/30ML
15 SUSPENSION ORAL EVERY 6 HOURS PRN
Status: DISCONTINUED | OUTPATIENT
Start: 2021-07-03 | End: 2021-07-03 | Stop reason: HOSPADM

## 2021-07-03 RX ORDER — METOPROLOL SUCCINATE 25 MG/1
25 TABLET, EXTENDED RELEASE ORAL EVERY 12 HOURS SCHEDULED
Qty: 60 TABLET | Refills: 0 | Status: SHIPPED | OUTPATIENT
Start: 2021-07-03 | End: 2021-07-28 | Stop reason: SDUPTHER

## 2021-07-03 RX ORDER — ONDANSETRON 2 MG/ML
4 INJECTION INTRAMUSCULAR; INTRAVENOUS EVERY 6 HOURS PRN
Status: DISCONTINUED | OUTPATIENT
Start: 2021-07-03 | End: 2021-07-03 | Stop reason: HOSPADM

## 2021-07-03 RX ADMIN — APIXABAN 5 MG: 5 TABLET, FILM COATED ORAL at 09:36

## 2021-07-03 RX ADMIN — METOPROLOL SUCCINATE 25 MG: 25 TABLET, EXTENDED RELEASE ORAL at 10:00

## 2021-07-03 RX ADMIN — SODIUM CHLORIDE, PRESERVATIVE FREE 10 ML: 5 INJECTION INTRAVENOUS at 09:37

## 2021-07-03 RX ADMIN — SODIUM CHLORIDE, PRESERVATIVE FREE 10 ML: 5 INJECTION INTRAVENOUS at 05:03

## 2021-07-03 NOTE — H&P
[facility]  Date: 7/3/2021   Patient Name: Steve Bryson Jr.  : 1940  MRN: [id number]  Primary Care Physician:  Ok Moore DO  Date of admission: 2021    Subjective   Subjective     Chief Complaint: \"Seizure\"    HPI:    Steve Bryson Jr. is a 80 y.o. male with history of hypertension, hyperlipidemia, osteoporosis,Chronic back pain, BPH who presented to emergency department today due to concern for seizure-like activity.  Patient states that he has been feeling more tired than usual the past several days.  He had been working outside, came in and sat down in his recliner and his wife states that he was asleep for about an hour.  She noticed that the patient was making \"weird noises\" and she came into check on him.  She noticed that his eyes were glazed over, he was diaphoretic, and making strange noises.  Patient's wife states that he was slightly twitching, however he did not vomit or have any episodes of incontinence.  He has no prior history of seizure disorders and states that this is never happened to him before.    Patient denies any preceding chest pain, dizziness, shortness of breath, or headache. States that he has been in his usual state of health recently. Denies any prior history of irregular heart rhythm. He is on any blood thinners. In the emergency department, patient was found to be in atrial fibrillation. He has had no prior history of such. Laboratory evaluation was fairly unremarkable. Troponin negative. CT head and chest x-ray clear. Cardiology was contacted who agreed to see the patient on admission to the hospital.    Personal History     Past Medical History:  Past Medical History:   Diagnosis Date   • BPH with urinary obstruction 2014   • Epididymal mass    • Follicular adenoma of thyroid gland 2021    RIGHT   • High blood pressure    • High cholesterol    • Hypogonadism, testicular        Past Surgical " History:  Past Surgical History:   Procedure Laterality Date   • MOLE REMOVAL  04/28/2014 5/1/14-HAS NOT REC'D PATHOLOGY RESULTS YET   • THYROIDECTOMY, PARTIAL     • TOTAL KNEE ARTHROPLASTY Right        Family History:   History reviewed. No pertinent family history.    Social History:    reports that he has quit smoking. He has never used smokeless tobacco. He reports previous alcohol use. He reports that he does not use drugs.    Home Medications:  Calcium, Cholecalciferol, Influenza Vac High-Dose Quad, Testosterone, Vitamin D (Cholecalciferol), alendronate, aspirin, atorvastatin, calcium carbonate-vitamin d, calcium-vitamin D, cetirizine, cholecalciferol, cyanocobalamin, finasteride, fluticasone, gabapentin, hydroCHLOROthiazide, lisinopril, metFORMIN, and tamsulosin    Allergies:  No Known Allergies    Review of Systems   All systems were reviewed and negative except for: Those listed in the HPI    Objective   Objective     Vitals:   Temp:  [97.9 °F (36.6 °C)] 97.9 °F (36.6 °C)  Heart Rate:  [] 89  Resp:  [17-20] 20  BP: (125-137)/(76-89) 125/82    Physical Exam    Constitutional: Awake, alert, no acute distress   Eyes: Pupils equal, sclerae anicteric, no conjunctival injection   HENT: NCAT, mucous membranes moist   Neck: Supple, no thyromegaly, no lymphadenopathy, trachea midline   Respiratory: Clear to auscultation bilaterally, nonlabored respirations    Cardiovascular: Irregularly irregular, rate controlled, no murmurs, rubs, or gallops, palpable  pedal pulses bilaterally   Gastrointestinal: Positive bowel sounds, soft, nontender, nondistended   Musculoskeletal: No bilateral ankle edema, no clubbing or cyanosis to extremities   Psychiatric: Appropriate affect, cooperative   Neurologic: Oriented x 3, strength symmetric in all extremities, Cranial Nerves grossly intact to  confrontation, speech clear   Skin: No rashes     Imaging:   CT Head Without Contrast    Result Date: 7/2/2021  PROCEDURE: CT HEAD  WO CONTRAST  COMPARISON: None.  INDICATIONS: SEIZURE TODAY.  PROTOCOL:   Standard imaging protocol performed    RADIATION:   MA and/or KV was adjusted to minimize radiation dose.    TECHNIQUE: After obtaining the patient's consent, 130 CT images were obtained without non-ionic intravenous contrast material.  DISCUSSION: A routine nonenhanced head CT was performed. No acute brain abnormality is identified. No acute intracranial hemorrhage. No acute infarction. No acute skull fracture. No midline shift or acute intracranial mass effect is seen.  Mild chronic small vessel ischemia/infarction is suspected. There are arterial calcifications. The extra-axial spaces and the ventricular system are mildly prominent.  CONCLUSION: No acute brain abnormality is seen.    GAURI ESCOBAR JR, MD       Electronically Signed and Approved By: GAURI ESCOBAR JR, MD on 7/02/2021 at 23:34             XR Chest 1 View    Result Date: 7/2/2021  PROCEDURE: XR CHEST 1 VW  COMPARISON: [facility], CR, CHEST PA/AP & LAT 2V, 5/08/2014, 10:08.  INDICATIONS: WEAK/DIZZY/AMS TRIAGE PROTOCOL.  FINDINGS:A single AP upright portable chest radiograph was performed.  No cardiac enlargement is seen.  No acute infiltrate is appreciated.  No pleural effusion or pneumothorax is identified.  The thoracic aorta is atherosclerotic.  External artifacts obscure detail.  Chronic calcified granulomatous disease involves the chest.  There is pulmonary hypoinflation, greater in degree or new, since the prior study.  There may be bibasilar atelectasis and/or fibrosis.  Otherwise, no significant interval change is seen since the prior study.  CONCLUSION:No acute infiltrate is appreciated.       GAURI ESCOBAR JR, MD       Electronically Signed and Approved By: GAURI ESCOBAR JR, MD on 7/02/2021 at 22:36               Result Review    Result Review:  I have personally reviewed the results from the time of this admission to 7/3/2021 00:37 EDT and agree with these  findings:  [x]  Laboratory  []  Microbiology  [x]  Radiology  []  EKG/Telemetry   []  Cardiology/Vascular   []  Pathology  []  Old records  []  Other:      Assessment/Plan   Assessment / Plan     Assessment:   New onset A. fib drill fibrillation with episode of RVR, now rate controlled  Seizure versus syncopal episode… Feel syncope is more likely  Essential hypertension  Hyperlipidemia  BPH  Chronic back pain due to compression fractures    Plan:    Admit to hospitalist service  Labs and imaging reviewed  Consult placed for cardiology, Dr. Muniz  Patient is not requiring any rate controlling medications at this time. Will monitor for any tachycardia and medicate as required  Continue to trend troponins  Obtain echocardiogram this morning  Reconcile and resume appropriate home meds    Patient's clinical course will dictate further management  Discussed with ED physician, RN        DVT prophylaxis:  No DVT prophylaxis order currently exists.    CODE STATUS:         Admission Status:  I believe this patient meets inpatient status.    Electronically signed by KASH Freire, 07/03/21, 12:37 AM EDT.

## 2021-07-03 NOTE — OUTREACH NOTE
Prep Survey      Responses   [facility]?  [facility]   Is LACE score < 7 ?  Yes   Emergency Room discharge w/ pulse ox?  No   Eligibility  [facility]   Hospital  [facility]    Date of Admission  07/02/21   Date of Discharge  07/03/21   Discharge Disposition  Home or Self Care   Discharge diagnosis  Afib   Does the patient have one of the following disease processes/diagnoses(primary or secondary)?  Other   Does the patient have Home health ordered?  No   Is there a DME ordered?  No   Prep survey completed?  Yes          Gifty Gayle RN

## 2021-07-03 NOTE — ED PROVIDER NOTES
"Subjective   Pt's wife reports that the pt had a seizure but he doesn't remember it. She reports that at first he was making \"ungodly noises, his head going back. He was sweating all over, his head, neck, and arms. He was jerking all over and his eyes were glazed over.\" Pt's wife reports that this lasted approximately 7 minutes. She reports that the pt has had weakness because he has difficulty walking.    Pt reports that he was feeling mildly weak all over before the seizure. He reports that he was fine today, he had been working outside, put a few screws into his barn, put his things away and then went back inside.     PCP: Dr. Moore      History provided by:  Patient and spouse  History limited by: N/A.   used: No    Seizures  Seizure activity on arrival: no    Seizure type:  Myoclonic  Preceding symptoms: no sensation of an aura present, no dizziness, no euphoria, no headache, no hyperventilation, no nausea, no numbness, no panic and no vision change    Preceding symptoms comment:  GENERALIZED WEAKNESS  Initial focality:  None  Episode characteristics: abnormal movements and generalized shaking    Postictal symptoms: no confusion, no memory loss and no somnolence    Return to baseline: yes    Severity:  Moderate  Duration:  7 minutes  Timing:  Once  Number of seizures this episode:  1  Progression:  Resolved  Context: not alcohol withdrawal, not cerebral palsy, not change in medication, not sleeping less, not developmental delay, not drug use, not emotional upset, not family hx of seizures, not fever, not flashing visual stimuli, not hydrocephalus, not intracranial lesion, not intracranial shunt, medical compliance, not possible hypoglycemia, not possible medication ingestion, not pregnant, not previous head injury and not stress    Recent head injury:  No recent head injuries  PTA treatment:  None  History of seizures: no        Review of Systems   Constitutional: Negative for chills and " fever.   HENT: Negative for congestion, ear pain, rhinorrhea, sore throat and tinnitus.    Eyes: Negative for photophobia and pain.   Respiratory: Negative for choking and shortness of breath.    Cardiovascular: Negative for chest pain, palpitations and leg swelling.   Gastrointestinal: Negative for abdominal distention, abdominal pain, diarrhea, nausea and vomiting.   Endocrine: Negative for polydipsia.   Genitourinary: Negative for difficulty urinating, dysuria and hematuria.   Musculoskeletal: Negative for back pain, gait problem and neck pain.   Skin: Negative for rash.   Allergic/Immunologic: Negative for food allergies.   Neurological: Positive for seizures and weakness (GENERALIZED). Negative for dizziness, speech difficulty, light-headedness, numbness and headaches.   Hematological: Negative for adenopathy.   Psychiatric/Behavioral: Negative for agitation, confusion, self-injury and suicidal ideas.       Past Medical History:   Diagnosis Date   • BPH with urinary obstruction 05/01/2014   • Diabetes mellitus (CMS/HCC)    • Epididymal mass    • Follicular adenoma of thyroid gland 05/19/2021    RIGHT   • High blood pressure    • High cholesterol    • Hypogonadism, testicular        No Known Allergies    Past Surgical History:   Procedure Laterality Date   • COLONOSCOPY      15 YEARS AGO    • MOLE REMOVAL  04/28/2014 5/1/14-HAS NOT REC'D PATHOLOGY RESULTS YET   • THYROIDECTOMY, PARTIAL     • TOTAL KNEE ARTHROPLASTY Right        History reviewed. No pertinent family history.    Social History     Socioeconomic History   • Marital status:      Spouse name: Not on file   • Number of children: Not on file   • Years of education: Not on file   • Highest education level: Not on file   Tobacco Use   • Smoking status: Former Smoker     Packs/day: 2.00     Years: 15.00     Pack years: 30.00   • Smokeless tobacco: Never Used   • Tobacco comment: 5/1/14-QUIT 25-30 YEARS AGO   Vaping Use   • Vaping Use: Never used    Substance and Sexual Activity   • Alcohol use: Not Currently   • Drug use: Never         Objective   Physical Exam  Vitals and nursing note reviewed.   Constitutional:       Appearance: Normal appearance. He is well-developed.   HENT:      Head: Normocephalic and atraumatic.      Right Ear: Tympanic membrane, ear canal and external ear normal.      Left Ear: Tympanic membrane, ear canal and external ear normal.      Nose: Nose normal.      Mouth/Throat:      Mouth: Mucous membranes are moist.      Pharynx: Oropharynx is clear.   Eyes:      General: Lids are normal.      Extraocular Movements: Extraocular movements intact.      Conjunctiva/sclera: Conjunctivae normal.      Pupils: Pupils are equal, round, and reactive to light.   Cardiovascular:      Rate and Rhythm: Tachycardia present. Rhythm irregularly irregular.      Pulses: Normal pulses.      Heart sounds: Normal heart sounds. No murmur heard.     Pulmonary:      Effort: Pulmonary effort is normal.      Breath sounds: Normal breath sounds. No stridor. No wheezing.   Abdominal:      General: Bowel sounds are normal.      Palpations: Abdomen is soft.      Tenderness: There is no abdominal tenderness.   Musculoskeletal:         General: Normal range of motion.      Right shoulder: Normal.      Left shoulder: Normal.      Right upper arm: Normal.      Left upper arm: Normal.      Right elbow: Normal.      Left elbow: Normal.      Right forearm: Normal.      Left forearm: Normal.      Right wrist: Normal.      Left wrist: Normal.      Right hand: Normal.      Left hand: Normal.      Cervical back: Normal, full passive range of motion without pain, normal range of motion and neck supple.      Thoracic back: Normal.      Lumbar back: Normal.      Right hip: Normal.      Left hip: Normal.      Right upper leg: Normal.      Left upper leg: Normal.      Right knee: Normal.      Left knee: Normal.      Right lower leg: Normal. No edema.      Left lower leg: Normal. No  "edema.      Right ankle: Normal.      Left ankle: Normal.      Right foot: Normal.      Left foot: Normal.   Skin:     General: Skin is warm and dry.      Capillary Refill: Capillary refill takes less than 2 seconds.   Neurological:      General: No focal deficit present.      Mental Status: He is alert and oriented to person, place, and time. Mental status is at baseline.      Cranial Nerves: Cranial nerves are intact.      Sensory: Sensation is intact.      Motor: Motor function is intact.      Coordination: Coordination is intact.   Psychiatric:         Attention and Perception: Attention and perception normal.         Mood and Affect: Mood and affect normal.         Speech: Speech normal.         Behavior: Behavior normal. Behavior is cooperative.         Thought Content: Thought content normal.         Cognition and Memory: Cognition and memory normal.         Judgment: Judgment normal.         Procedures         ED Course  ED Course as of Jul 03 0605 Fri Jul 02, 2021 2155 EKG: A FIB 98, NML QRS, NML ST SEGMENT, NML QT    NO PREVIOUS FOR COMPARISON     ECG 12 Lead [LS]      ED Course User Index  [LS] Avila Kay         /87 (BP Location: Left arm, Patient Position: Lying)   Pulse 96   Temp 98.4 °F (36.9 °C) (Oral)   Resp 19   Ht 167.6 cm (66\")   Wt 103 kg (227 lb)   SpO2 96%   BMI 36.64 kg/m²   Labs Reviewed   COMPREHENSIVE METABOLIC PANEL - Abnormal; Notable for the following components:       Result Value    Glucose 145 (*)     All other components within normal limits    Narrative:     GFR Normal >60  Chronic Kidney Disease <60  Kidney Failure <15     URINALYSIS W/ MICROSCOPIC IF INDICATED (NO CULTURE) - Abnormal; Notable for the following components:    Protein, UA 30 mg/dL (1+) (*)     All other components within normal limits   CBC WITH AUTO DIFFERENTIAL - Abnormal; Notable for the following components:    Lymphocyte % 14.3 (*)     Neutrophils, Absolute 7.68 (*)     Eosinophils, " Absolute 0.44 (*)     All other components within normal limits   URINALYSIS, MICROSCOPIC ONLY - Abnormal; Notable for the following components:    RBC, UA 0-2 (*)     WBC, UA 0-2 (*)     All other components within normal limits   POCT GLUCOSE FINGERSTICK - Abnormal; Notable for the following components:    Glucose 132 (*)     All other components within normal limits   TROPONIN (IN-HOUSE) - Normal    Narrative:     Troponin T Reference Range:  <= 0.03 ng/mL-   Negative for AMI  >0.03 ng/mL-     Abnormal for myocardial necrosis.  Clinicians would have to utilize clinical acumen, EKG, Troponin and serial changes to determine if it is an Acute Myocardial Infarction or myocardial injury due to an underlying chronic condition.       Results may be falsely decreased if patient taking Biotin.     MAGNESIUM - Normal   RAINBOW DRAW    Narrative:     The following orders were created for panel order Gary Draw.  Procedure                               Abnormality         Status                     ---------                               -----------         ------                     Green Top (Gel)[[id number]]                                  Final result               Lavender Top[[id number]]                                     Final result               Gold Top - SST[[id number]]                                   Final result                 Please view results for these tests on the individual orders.   BASIC METABOLIC PANEL   CBC WITH AUTO DIFFERENTIAL   MAGNESIUM   TROPONIN (IN-HOUSE)   TROPONIN (IN-HOUSE)   CBC AND DIFFERENTIAL    Narrative:     The following orders were created for panel order CBC & Differential.  Procedure                               Abnormality         Status                     ---------                               -----------         ------                     CBC Auto Differential[[id number]]        Abnormal            Final result                 Please view results for these tests on the  individual orders.   GREEN TOP   LAVENDER TOP   GOLD TOP - SST     Medications   sodium chloride 0.9 % flush 10 mL (has no administration in time range)   sodium chloride 0.9 % flush 10 mL (10 mL Intravenous Given 7/3/21 0503)   sodium chloride 0.9 % flush 10 mL (has no administration in time range)   aluminum-magnesium hydroxide-simethicone (MAALOX MAX) 400-400-40 MG/5ML suspension 15 mL (has no administration in time range)   melatonin tablet 5 mg (has no administration in time range)   acetaminophen (TYLENOL) tablet 650 mg (has no administration in time range)   ondansetron (ZOFRAN) injection 4 mg (has no administration in time range)   sodium chloride 0.9 % bolus 1,000 mL (0 mL Intravenous Stopped 7/3/21 0044)     CT Head Without Contrast    Result Date: 7/2/2021  Narrative: PROCEDURE: CT HEAD WO CONTRAST  COMPARISON: None.  INDICATIONS: SEIZURE TODAY.  PROTOCOL:   Standard imaging protocol performed    RADIATION:   MA and/or KV was adjusted to minimize radiation dose.    TECHNIQUE: After obtaining the patient's consent, 130 CT images were obtained without non-ionic intravenous contrast material.  DISCUSSION: A routine nonenhanced head CT was performed. No acute brain abnormality is identified. No acute intracranial hemorrhage. No acute infarction. No acute skull fracture. No midline shift or acute intracranial mass effect is seen.  Mild chronic small vessel ischemia/infarction is suspected. There are arterial calcifications. The extra-axial spaces and the ventricular system are mildly prominent.  CONCLUSION: No acute brain abnormality is seen.    GAURI ESCOBAR JR, MD       Electronically Signed and Approved By: GAURI ESCOBAR JR, MD on 7/02/2021 at 23:34             XR Chest 1 View    Result Date: 7/2/2021  Narrative: PROCEDURE: XR CHEST 1 VW  COMPARISON: [facility], CR, CHEST PA/AP & LAT 2V, 5/08/2014, 10:08.  INDICATIONS: WEAK/DIZZY/AMS TRIAGE PROTOCOL.  FINDINGS:A single AP upright portable chest radiograph  was performed.  No cardiac enlargement is seen.  No acute infiltrate is appreciated.  No pleural effusion or pneumothorax is identified.  The thoracic aorta is atherosclerotic.  External artifacts obscure detail.  Chronic calcified granulomatous disease involves the chest.  There is pulmonary hypoinflation, greater in degree or new, since the prior study.  There may be bibasilar atelectasis and/or fibrosis.  Otherwise, no significant interval change is seen since the prior study.  CONCLUSION:No acute infiltrate is appreciated.       GAURI ESCOBAR JR, MD       Electronically Signed and Approved By: GAURI ESCOBAR JR, MD on 7/02/2021 at 22:36                                           NIHSS (NIH Stroke Scale/Score) reviewed and/or performed as part of the patient evaluation and treatment planning process.  The result associated with this review/performance is: 0           MDM    Patient found to have new onset atrial fibrillation.  We discussed possibility of arrhythmia resulting in syncope and mild myoclonic jerks leading to patient's presentation today which I feel is more likely than seizure.  CT head shows no acute intracranial process.  Laboratory examinations are unremarkable.  Patient was discussed with Dr. Muniz of cardiology who agrees to consultation.  Patient discussed with hospitalist team for admission.  The patient's airway is intact, vital signs, and respiratory status are safe for admission at this time.        Final diagnoses:   Atrial fibrillation, new onset (CMS/HCC)   Syncope, unspecified syncope type       Documentation assistance provided by reynold Kay.  Information recorded by the scribe was done at my direction and has been verified and validated by me.     Avila Kay  07/02/21 [id number]       Enio Pinto MD  07/03/21 [id number]

## 2021-07-03 NOTE — PLAN OF CARE
Goal Outcome Evaluation:  Plan of Care Reviewed With: patient        Progress: no change       No changes in status, vital signs stable, continuing to monitor

## 2021-07-03 NOTE — DISCHARGE SUMMARY
[facility]         HOSPITALIST  DISCHARGE SUMMARY    Patient Name: Steve Bryson Jr.  : 1940  MRN: [id number]    Date of Admission: 2021  Date of Discharge:  7/3/2021    Primary Care Physician: Ok Moore,     Consults     Date and Time Order Name Status Description    7/3/2021  2:11 AM Inpatient Cardiology Consult Completed     7/3/2021 12:05 AM Hospitalist (on-call MD unless specified) Completed     2021 11:16 PM Cardiology (on-call MD unless specified) Completed           Active and Resolved Hospital Problems:  Active Hospital Problems    Diagnosis POA   • A-fib (CMS/HCC) [I48.91] Yes      Resolved Hospital Problems   No resolved problems to display.       Hospital Course     Hospital Course:  Steve Bryson Jr. is a 80 y.o. male who presented to the emergency department due to an episode of seizure-like activity.  The patient reports he was in his usual state of health until this afternoon when he was working outside and returned inside because he was feeling fatigued.  He states that this is not atypical for him.  He states that he sat down in his recliner and took a nap.  He then ate and sat back in the recliner and the next thing he remembers was his wife telling him they need to go to the emergency department.  The patient reportedly had some shaking-like activity and was unresponsive.  No loss of bowel or bladder.  No postictal state.  On arrival to the emergency department the patient was in atrial fibrillation with a rapid ventricular response.  He was given diltiazem.  Patient is now rate controlled in NSR.  Patient was seen by cardiology and patient was started on Eliquis given patient has a CHADS2 score of 4.  Patient was also started on Toprol.  Patient currently in normal sinus rhythm.  Patient states that he has never felt bad ever since coming to the hospital and is ready to be discharged home.  At this time will discharge patient home in  stable condition with outpatient cardiology follow-up.  Discussed with patient and his wife the risks of taking Eliquis and the bleeding risk.  Also discussed with patient that he should avoid staying outside for long periods of time in the heat.  Patient understands and states that he will try to let the younger boys do the work.          Day of Discharge     Vital Signs:  Temp:  [97.5 °F (36.4 °C)-98.4 °F (36.9 °C)] 97.5 °F (36.4 °C)  Heart Rate:  [] 91  Resp:  [17-20] 20  BP: (123-138)/(74-89) 138/82  Physical Exam:     Constitutional: Awake, alert, no acute distress              Eyes: Pupils equal, sclerae anicteric, no conjunctival injection              HENT: NCAT, mucous membranes moist              Neck: Supple              Respiratory: Clear to auscultation bilaterally, nonlabored respirations               Cardiovascular: NSR, rate controlled, no murmurs,               Gastrointestinal: Positive bowel sounds, soft, nontender, nondistended              Musculoskeletal: No bilateral ankle edema, no clubbing or cyanosis to extremities              Psychiatric: Appropriate affect, cooperative              Neurologic: Oriented x 3, strength symmetric in all extremities, Cranial Nerves grossly intact to          confrontation, speech clear              Skin: No rashes       Discharge Details        Discharge Medications      New Medications      Instructions Start Date   apixaban 5 MG tablet tablet  Commonly known as: ELIQUIS   5 mg, Oral, Every 12 Hours Scheduled      metoprolol succinate XL 25 MG 24 hr tablet  Commonly known as: TOPROL-XL   25 mg, Oral, Every 12 Hours Scheduled         Changes to Medications      Instructions Start Date   alendronate 70 MG tablet  Commonly known as: FOSAMAX  What changed: additional instructions   70 mg, Oral, Every 7 Days      atorvastatin 10 MG tablet  Commonly known as: LIPITOR  What changed: Another medication with the same name was removed. Continue taking this  medication, and follow the directions you see here.   10 mg, Oral, Daily      Cholecalciferol 25 MCG (1000 UT) capsule  What changed: Another medication with the same name was removed. Continue taking this medication, and follow the directions you see here.   1,000 Units, Oral, Daily      fluticasone 50 MCG/ACT nasal spray  Commonly known as: FLONASE  What changed:   · when to take this  · reasons to take this   2 sprays, Nasal, Daily      gabapentin 600 MG tablet  Commonly known as: NEURONTIN  What changed: Another medication with the same name was removed. Continue taking this medication, and follow the directions you see here.   600 mg, Oral, 3 Times Daily      Testosterone 10 MG/ACT (2%) gel  What changed: additional instructions   40 mg, Transdermal, Daily         Continue These Medications      Instructions Start Date   aspirin 81 MG EC tablet   81 mg, Oral, Daily      cetirizine 10 MG tablet  Commonly known as: zyrTEC   10 mg, Oral, Daily      cyanocobalamin 1000 MCG tablet  Commonly known as: VITAMIN B-12   Vitamin B-12  1,000 mcg tablet      finasteride 5 MG tablet  Commonly known as: PROSCAR   5 mg, Oral, Daily      hydroCHLOROthiazide 12.5 MG tablet  Commonly known as: HYDRODIURIL   12.5 mg, Oral, Daily      lisinopril 20 MG tablet  Commonly known as: PRINIVIL,ZESTRIL   20 mg, Oral, Daily      metFORMIN 1000 MG tablet  Commonly known as: GLUCOPHAGE   1,000 mg, Oral, 2 Times Daily With Meals, Take 1 PO BID      tamsulosin 0.4 MG capsule 24 hr capsule  Commonly known as: FLOMAX   1 capsule, Oral, Nightly         Stop These Medications    Calcium 500-100 MG-UNIT chewable tablet     calcium carbonate-vitamin d 600-400 MG-UNIT per tablet     calcium-vitamin D 500-200 MG-UNIT tablet per tablet     Fluzone High-Dose Quadrivalent 0.7 ML suspension prefilled syringe  Generic drug: Influenza Vac High-Dose Quad            No Known Allergies    Discharge Disposition:  Home or Self Care    Diet:  Hospital:  Diet Order    Procedures   • Diet Regular       Discharge Activity:   Activity Instructions     Activity as Tolerated            CODE STATUS:  There are no questions and answers to display.         Future Appointments   Date Time Provider Department Center   8/30/2021  2:00 PM [facility]       Additional Instructions for the Follow-ups that You Need to Schedule     Discharge Follow-up with PCP   As directed       Currently Documented PCP:    Ok Moore DO    PCP Phone Number:    [phone]     Follow Up Details: in 1 week         Discharge Follow-up with Specified Provider: Dr. Muniz (cardiology); 1 Week   As directed      To: Dr. Muniz (cardiology)    Follow Up: 1 Week               Pertinent  and/or Most Recent Results     PROCEDURES:   none    LAB RESULTS:      Lab 07/03/21  0456 07/02/21 2049   WBC 10.08 10.53   HEMOGLOBIN 14.1 13.7   HEMATOCRIT 40.9 39.5   PLATELETS 207 192   NEUTROS ABS 6.67 7.68*   IMMATURE GRANS (ABS) 0.03 0.03   LYMPHS ABS 1.97 1.51   MONOS ABS 0.99* 0.79   EOS ABS 0.34 0.44*   MCV 95.3 93.4         Lab 07/03/21  0456 07/02/21 2049   SODIUM 142 139   POTASSIUM 3.7 3.6   CHLORIDE 105 104   CO2 25.4 23.4   ANION GAP 11.6 11.6   BUN 11 13   CREATININE 0.93 0.93   GLUCOSE 120* 145*   CALCIUM 10.4 10.2   MAGNESIUM 2.0 1.7         Lab 07/02/21 2049   TOTAL PROTEIN 6.9   ALBUMIN 4.00   GLOBULIN 2.9   ALT (SGPT) 32   AST (SGOT) 22   BILIRUBIN 0.6   ALK PHOS 85         Lab 07/03/21  0804 07/03/21  0456 07/02/21 2049   TROPONIN T <0.010 <0.010 <0.010                 Brief Urine Lab Results  (Last result in the past 365 days)      Color   Clarity   Blood   Leuk Est   Nitrite   Protein   CREAT   Urine HCG        07/02/21 2253 Yellow Clear Negative Negative Negative 30 mg/dL (1+)             Microbiology Results (last 10 days)     ** No results found for the last 240 hours. **                       Results for orders placed during the hospital encounter of  07/02/21    Adult Transthoracic Echo Complete W/ Cont if Necessary Per Protocol    Interpretation Summary  1.  Normal left ventricular systolic function.  2.  Fibrocalcific mitral and aortic valves.  3.  Mild tricuspid regurgitation and trace mitral regurgitation.  4.  Trace aortic regurgitation.  5.  Biatrial enlargement noted.              Time spent on Discharge including face to face service:  More than 35 minutes    Electronically signed by Hai Hoskins DO, 07/03/21, 12:55 PM EDT.

## 2021-07-03 NOTE — CONSULTS
[facility]   Cardiology Consult Note    Patient Name: Steve Bryson Jr.  : 1940  MRN: [id number]  Primary Care Physician:  Ok Moore DO  Referring Physician: No ref. provider found  Date of admission: 2021    Subjective   Subjective     Reason for Consult/ Chief Complaint: Seizure-like activity/atrial fibrillation    HPI:  Steve Bryson Jr. is a 80 y.o. male with history of hypertension, hyperlipidemia.  He presents to the emergency room with seizure-like activity.  No definite witnessed syncope.  Denies any preceding chest pain dizziness or shortness of breath.  No palpitations.  Found to be in atrial fibrillation in the emergency room rate controlled.    Review of Systems:   Constitutional no fever,  no weight loss   Skin no rash   Otolaryngeal no difficulty swallowing   Cardiovascular See HPI   Pulmonary no cough, no sputum production   Gastrointestinal no constipation, no diarrhea   Genitourinary no dysuria, no hematuria   Hematologic no easy bruisability, no abnormal bleeding   Musculoskeletal no muscle pain   Neurologic no dizziness, no falls         Personal History       Past Medical/Surgical History:   Past Medical History:   Diagnosis Date   • BPH with urinary obstruction 2014   • Diabetes mellitus (CMS/HCC)    • Epididymal mass    • Follicular adenoma of thyroid gland 2021    RIGHT   • High blood pressure    • High cholesterol    • Hypogonadism, testicular      Past Surgical History:   Procedure Laterality Date   • COLONOSCOPY      15 YEARS AGO    • MOLE REMOVAL  2014-HAS NOT REC'D PATHOLOGY RESULTS YET   • THYROIDECTOMY, PARTIAL     • TOTAL KNEE ARTHROPLASTY Right          Family History: No family History of CAD.    Social History:  reports that he has quit smoking. He has a 30.00 pack-year smoking history. He has never used smokeless tobacco. He reports previous alcohol use. He reports that he does not use  drugs.    Medications:  Medications Prior to Admission   Medication Sig Dispense Refill Last Dose   • alendronate (FOSAMAX) 70 MG tablet Take 1 tablet by mouth Every 7 (Seven) Days. 13 tablet 3 7/2/2021 at Unknown time   • aspirin (aspirin) 81 MG EC tablet Take 1 tablet by mouth Daily. 90 tablet 3 7/2/2021 at Unknown time   • atorvastatin (LIPITOR) 20 MG tablet Take 0.5 tablets by mouth Every Night. 90 tablet 3 7/2/2021 at Unknown time   • Calcium 500-100 MG-UNIT chewable tablet    7/2/2021 at Unknown time   • Calcium Carbonate-Vitamin D (calcium-vitamin D) 500-200 MG-UNIT tablet per tablet Oyster Shell Calcium-Vitamin D3 500 mg (1,250 mg)-200 unit tablet   7/2/2021 at Unknown time   • calcium carbonate-vitamin d 600-400 MG-UNIT per tablet Every 12 (Twelve) Hours.   7/2/2021 at Unknown time   • cetirizine (zyrTEC) 10 MG tablet Take 1 tablet by mouth Daily. 90 tablet 3 7/2/2021 at Unknown time   • cholecalciferol (VITAMIN D3) 25 MCG (1000 UT) tablet    7/2/2021 at Unknown time   • Cholecalciferol 25 MCG (1000 UT) capsule cholecalciferol (vitamin D3) 25 mcg (1,000 unit) capsule   Take 1 capsule every day by oral route.   7/2/2021 at Unknown time   • cyanocobalamin (VITAMIN B-12) 1000 MCG tablet Vitamin B-12  1,000 mcg tablet   7/2/2021 at Unknown time   • fluticasone (FLONASE) 50 MCG/ACT nasal spray 2 sprays into the nostril(s) as directed by provider Daily. 48 g 3 7/2/2021 at Unknown time   • gabapentin (NEURONTIN) 600 MG tablet    7/2/2021 at Unknown time   • gabapentin (NEURONTIN) 600 MG tablet Take 600 mg by mouth 3 (Three) Times a Day.   7/2/2021 at Unknown time   • hydroCHLOROthiazide (MICROZIDE) 12.5 MG capsule hydrochlorothiazide 12.5 mg capsule   7/2/2021 at Unknown time   • Influenza Vac High-Dose Quad (Fluzone High-Dose Quadrivalent) 0.7 ML suspension prefilled syringe Fluzone High-Dose Quad 2020-21 (PF) 240 mcg/0.7 mL IM syringe   7/2/2021 at Unknown time   • lisinopril (PRINIVIL,ZESTRIL) 20 MG tablet  lisinopril 20 mg tablet   7/2/2021 at Unknown time   • metFORMIN (GLUCOPHAGE) 1000 MG tablet Take 1 PO BID   7/2/2021 at Unknown time   • tamsulosin (FLOMAX) 0.4 MG capsule 24 hr capsule tamsulosin 0.4 mg capsule   7/2/2021 at Unknown time   • Testosterone 10 MG/ACT (2%) gel Place 40 mg on the skin as directed by provider Daily. 60 g 2 7/2/2021 at Unknown time   • Vitamin D, Cholecalciferol, 25 MCG (1000 UT) capsule Take 1 PO daily   7/2/2021 at Unknown time   • finasteride (PROSCAR) 5 MG tablet finasteride 5 mg tablet        Current medications:  sodium chloride, 10 mL, Intravenous, Q12H      Current IV drips:       Allergies:  No Known Allergies    Objective    Objective     Vitals:   Temp:  [97.9 °F (36.6 °C)-98.4 °F (36.9 °C)] 98.4 °F (36.9 °C)  Heart Rate:  [] 96  Resp:  [17-20] 19  BP: (125-137)/(76-89) 127/87      Physical Exam:   Constitutional: Awake, alert, No acute distress    Eyes: PERRLA, sclerae anicteric, no conjunctival injection   HENT: NCAT, mucous membranes moist   Neck: Supple, no thyromegaly, no lymphadenopathy, trachea midline   Respiratory: Clear to auscultation bilaterally, nonlabored respirations    Cardiovascular: RRR, no murmurs, rubs, or gallops, palpable pedal pulses bilaterally   Gastrointestinal: Positive bowel sounds, soft, nontender, nondistended   Musculoskeletal: No bilateral ankle edema, no clubbing or cyanosis to extremities   Psychiatric: Appropriate affect, cooperative   Neurologic: Oriented x 3, strength symmetric in all extremities, Cranial Nerves grossly intact to confrontation, speech clear   Skin: No rashes.    Result Review    Result Review:  I have personally reviewed the results from the time of this admission to 7/3/2021 08:04 EDT and agree with these findings:  [x]  Laboratory  [x]  EKG/Telemetry   [x]  Cardiology/Vascular   []  Pathology  [x]  Old records  [x]  Medications  Basic Metabolic Panel    Sodium Sodium   Date Value Ref Range Status   07/03/2021 142  136 - 145 mmol/L Final   07/02/2021 139 136 - 145 mmol/L Final      Potassium Potassium   Date Value Ref Range Status   07/03/2021 3.7 3.5 - 5.2 mmol/L Final   07/02/2021 3.6 3.5 - 5.2 mmol/L Final      Chloride Chloride   Date Value Ref Range Status   07/03/2021 105 98 - 107 mmol/L Final   07/02/2021 104 98 - 107 mmol/L Final      Bicarbonate No results found for: PLASMABICARB   BUN BUN   Date Value Ref Range Status   07/03/2021 11 8 - 23 mg/dL Final   07/02/2021 13 8 - 23 mg/dL Final      Creatinine Creatinine   Date Value Ref Range Status   07/03/2021 0.93 0.76 - 1.27 mg/dL Final   07/02/2021 0.93 0.76 - 1.27 mg/dL Final      Calcium Calcium   Date Value Ref Range Status   07/03/2021 10.4 8.6 - 10.5 mg/dL Final   07/02/2021 10.2 8.6 - 10.5 mg/dL Final                  EKG shows atrial fibrillation with no acute changes.  Telemetry reviewed atrial fibrillation with a controlled heart rate    Assessment / Plan     Impression:   1.  Atrial fibrillation with a controlled heart rate.  2.  Seizure-like activity, cannot exclude syncopal spell.      Plan:   Continue monitoring for any significant arrhythmias.  Echocardiogram  Start Eliquis 5 mg twice daily if no contraindication.  Discussed with the patient risk benefits.  Low-dose Toprol for rate control.    Electronically signed by Brian Muniz MD, 07/03/21, 8:04 AM EDT.

## 2021-07-04 ENCOUNTER — TRANSITIONAL CARE MANAGEMENT TELEPHONE ENCOUNTER (OUTPATIENT)
Dept: CALL CENTER | Facility: HOSPITAL | Age: 81
End: 2021-07-04

## 2021-07-04 LAB — QT INTERVAL: 333 MS

## 2021-07-04 NOTE — OUTREACH NOTE
Call Center TCM Note      Responses   [facility] patient discharged from?  [facility]   Does the patient have one of the following disease processes/diagnoses(primary or secondary)?  Other   TCM attempt successful?  No   Unsuccessful attempts  Attempt 1          Jessica Hugo RN    7/4/2021, 12:28 EDT

## 2021-07-04 NOTE — OUTREACH NOTE
Call Center TCM Note      Responses   [facility] patient discharged from?  [facility]   Does the patient have one of the following disease processes/diagnoses(primary or secondary)?  Other   TCM attempt successful?  No   Unsuccessful attempts  Attempt 2          eJssica Hugo RN    7/4/2021, 13:30 EDT

## 2021-07-05 ENCOUNTER — TRANSITIONAL CARE MANAGEMENT TELEPHONE ENCOUNTER (OUTPATIENT)
Dept: CALL CENTER | Facility: HOSPITAL | Age: 81
End: 2021-07-05

## 2021-07-05 NOTE — OUTREACH NOTE
"Call Center TCM Note      Responses   [facility] patient discharged from?  [facility]   Does the patient have one of the following disease processes/diagnoses(primary or secondary)?  Other   TCM attempt successful?  Yes   Call start time  [id number]   Call end time  0919   Discharge diagnosis  Afib   Medication alerts for this patient  Started on Eliquis--reviewed importance of compliance   Meds reviewed with patient/caregiver?  Yes   Is the patient having any side effects they believe may be caused by any medication additions or changes?  No   Does the patient have all medications ordered at discharge?  Yes   Is the patient taking all medications as directed (includes completed medication regime)?  Yes   Comments regarding appointments  Cardiology to call patient for appt and he is to call if he has not heard from office by Friday the 9th   Does the patient have a primary care provider?   Yes   Does the patient have an appointment with their PCP within 7 days of discharge?  No   What is preventing the patient from scheduling follow up appointments within 7 days of discharge?  Earlier appointment not available   Nursing Interventions  -- [Notified patient that office will be notified to call him to schedule a hospital f/u,  no available appointments on  that meet TCM guidelines that can be scheduled per this RN. ]   Has the patient kept scheduled appointments due by today?  N/A   What is the Home health agency?   na   Psychosocial issues?  No   Did the patient receive a copy of their discharge instructions?  Yes   Nursing interventions  Reviewed instructions with patient   What is the patient's perception of their health status since discharge?  Same [\"Not fully well yet\"]   Is the patient/caregiver able to teach back signs and symptoms related to disease process for when to call PCP?  Yes [REviewed with patient s/s r/t Afib which require intervention/notification]   Is the patient/caregiver able to teach back " signs and symptoms related to disease process for when to call 911?  Yes   Is the patient/caregiver able to teach back the hierarchy of who to call/visit for symptoms/problems? PCP, Specialist, Home health nurse, Urgent Care, ED, 911  Yes   If the patient is a current smoker, are they able to teach back resources for cessation?  Not a smoker   TCM call completed?  Yes          Andreea Vail RN    7/5/2021, 09:21 EDT

## 2021-07-08 ENCOUNTER — HOSPITAL ENCOUNTER (OUTPATIENT)
Dept: GENERAL RADIOLOGY | Facility: HOSPITAL | Age: 81
Discharge: HOME | End: 2021-07-08
Admitting: FAMILY MEDICINE

## 2021-07-08 DIAGNOSIS — M89.9 BONE LESION: ICD-10-CM

## 2021-07-08 PROCEDURE — 72190 X-RAY EXAM OF PELVIS: CPT

## 2021-07-09 DIAGNOSIS — R19.03 RIGHT LOWER QUADRANT ABDOMINAL MASS: Primary | ICD-10-CM

## 2021-07-12 ENCOUNTER — OFFICE VISIT (OUTPATIENT)
Dept: FAMILY MEDICINE CLINIC | Facility: CLINIC | Age: 81
End: 2021-07-12

## 2021-07-12 DIAGNOSIS — R19.03 RIGHT LOWER QUADRANT ABDOMINAL MASS: ICD-10-CM

## 2021-07-12 DIAGNOSIS — R56.9 SEIZURE-LIKE ACTIVITY (HCC): ICD-10-CM

## 2021-07-12 DIAGNOSIS — S32.040D COMPRESSION FRACTURE OF L4 VERTEBRA WITH ROUTINE HEALING, SUBSEQUENT ENCOUNTER: ICD-10-CM

## 2021-07-12 DIAGNOSIS — I34.0 MITRAL VALVE INSUFFICIENCY, UNSPECIFIED ETIOLOGY: ICD-10-CM

## 2021-07-12 DIAGNOSIS — R90.89 ABNORMAL BRAIN CT: ICD-10-CM

## 2021-07-12 DIAGNOSIS — I07.1 TRICUSPID VALVE INSUFFICIENCY, UNSPECIFIED ETIOLOGY: ICD-10-CM

## 2021-07-12 DIAGNOSIS — I48.91 ATRIAL FIBRILLATION, UNSPECIFIED TYPE (HCC): Primary | ICD-10-CM

## 2021-07-12 DIAGNOSIS — S22.060G COMPRESSION FRACTURE OF T8 VERTEBRA WITH DELAYED HEALING, SUBSEQUENT ENCOUNTER: ICD-10-CM

## 2021-07-12 PROCEDURE — 99496 TRANSJ CARE MGMT HIGH F2F 7D: CPT | Performed by: FAMILY MEDICINE

## 2021-07-12 PROCEDURE — 1111F DSCHRG MED/CURRENT MED MERGE: CPT | Performed by: FAMILY MEDICINE

## 2021-07-12 RX ORDER — ALENDRONATE SODIUM 70 MG/1
TABLET ORAL
COMMUNITY
Start: 2021-06-30 | End: 2021-07-13

## 2021-07-12 NOTE — PROGRESS NOTES
"Chief Complaint  Hospital Follow Up Visit    Subjective          Steve Bryson Jr. presents to [facility]  History of Present Illness  Patient presents today for inpatient follow-up.  Contact was made by nursing staff for transitional care management on 7/5/2021.  Patient was admitted to [facility] in [place] on 7/2/2021 and discharged on 7/3/2021.  The attending for patient's hospitalization was Dr. Hai Hoskins. He was admitted due to concern of \"seizure-like activity\".  He was working outside in the afternoon and came into rest.  He states he felt fatigued which is not unusual for him.  He reports that the next thing he remembers he was woken up by his wife and told he needed to go to the emergency room.  When he got to the emergency room he was noted to have atrial fibrillation with rapid ventricular response.  This is a new diagnosis for him.  He was admitted and cardiology was consulted.  He was placed on diltiazem and eventually switch over to Toprol-XL.  He is now taking 25 mg twice daily.  He denies any recurrence of the symptoms he had.  Reviewing the records it appears that they suspected possibility of jerking motion secondary to syncope versus an actual seizure.  Neurology was not consulted.  He did however have a head CT done.  Head CT was significant for no acute brain abnormality but it did show that there was mild chronic small vessel ischemia/infarction suspected with arterial calcifications and there was also extra-axial spaces in the ventricular system that were mildly prominent.  He does have some issues with ambulation.  I discussed with patient given findings referral to neurology.  Also discussed with him getting an EEG to further evaluate his possible \"seizure-like activity\".  Patient verbalized understanding and agreement.  His medications were reviewed and reconciled.  He is now taking Eliquis for anticoagulation for atrial " fibrillation.  It is noted that patient did convert over to normal sinus rhythm.  Patient reports that his back is still bothering him as well.  I did order an x-ray of his thoracic and lumbar spine.  Thoracic spine showed probably old moderate to severe compression fractures of to inferior thoracic vertebral bodies at T8 and T9.  He also had in the lumbar spine moderate to severe compression fracture of indeterminate age in the superior endplate of the L4 lumbar vertebral body.  This is both per radiology report.  Given these findings I discussed with him referral for neurosurgical consultation.  I have made this referral previously.  Objective   Vital Signs:   There were no vitals taken for this visit.    Physical Exam   General: AAO ×3, no acute distress, pleasant  HEENT: Normocephalic, atraumatic  Cardiovascular: Regular rate and rhythm without appreciable murmur  Respiratory: Clear to auscultation bilaterally no RRW  Gastrointestinal: Soft nontender nondistended with bowel sounds present  extremities: No edema  Neurologic: CN II through XII grossly intact   Psychiatric: Normal mood and affect  Result Review :                 Assessment and Plan    Diagnoses and all orders for this visit:    1. Atrial fibrillation, unspecified type (CMS/HCC) (Primary)    2. Tricuspid valve insufficiency, unspecified etiology    3. Mitral valve insufficiency, unspecified etiology    4. Abnormal brain CT    5. Right lower quadrant abdominal mass    6. Seizure-like activity (CMS/HCC)    I reviewed patient's hospitalization records.  He had an echocardiogram done which showed mild tricuspid and trace mitral regurgitation.  There was trace aortic regurgitation.  Normal left ventricular systolic function.  No mention of any clots.  Discussed with patient continue current medical management.  I will order an EEG to further evaluate the episode he had that sent him to the emergency room.  It is likely related to the syncopal episode due  to atrial fibrillation with RVR.  I will also refer him to neurology given his brain CT findings.  He does have some difficulty with ambulation and it is noted that he has extra-axial spaces and ventricular system are mildly prominent.  Patient does have an appointment to see cardiology tomorrow.  I did encourage him to keep this appointment.  I will see him back for his next regular scheduled appointment.  He will have labs done prior to next appointment.    Follow Up   Return in about 2 months (around 9/12/2021) for keep next appointment.  Patient was given instructions and counseling regarding his condition or for health maintenance advice. Please see specific information pulled into the AVS if appropriate.

## 2021-07-13 ENCOUNTER — OFFICE VISIT (OUTPATIENT)
Dept: CARDIOLOGY | Facility: CLINIC | Age: 81
End: 2021-07-13

## 2021-07-13 VITALS
HEIGHT: 66 IN | SYSTOLIC BLOOD PRESSURE: 112 MMHG | WEIGHT: 225 LBS | HEART RATE: 64 BPM | DIASTOLIC BLOOD PRESSURE: 68 MMHG | BODY MASS INDEX: 36.16 KG/M2

## 2021-07-13 DIAGNOSIS — I10 ESSENTIAL HYPERTENSION: ICD-10-CM

## 2021-07-13 DIAGNOSIS — I48.0 PAROXYSMAL ATRIAL FIBRILLATION (HCC): Primary | ICD-10-CM

## 2021-07-13 DIAGNOSIS — E78.2 MIXED HYPERLIPIDEMIA: ICD-10-CM

## 2021-07-13 PROCEDURE — 99214 OFFICE O/P EST MOD 30 MIN: CPT | Performed by: NURSE PRACTITIONER

## 2021-07-13 PROCEDURE — 93000 ELECTROCARDIOGRAM COMPLETE: CPT | Performed by: NURSE PRACTITIONER

## 2021-07-13 NOTE — PROGRESS NOTES
Chief Complaint  Atrial Fibrillation, Hypertension, and Hospital Follow Up Visit    Subjective            History of Present Illness  Steve Bryson Jr. is a 80-year-old white/ male patient who presents to the office today for hospital follow-up.  He was admitted to [facility] on 2021 and discharged on 2021.  He was diagnosed with atrial fibrillation, this was a new diagnosis for him.  He was started on Eliquis and metoprolol.  Before he was discharged he was back in normal sinus rhythm.  His echocardiogram on 7/3/2021 showed mild tricuspid and trace mitral regurgitation plus trace aortic regurgitation.  Today he reports compliance with his new medications and denies any adverse reactions.  He denies any chest pain, shortness of breath, palpitations, or lightheadedness.    PMH  Past Medical History:   Diagnosis Date   • Arrhythmia    • BPH with urinary obstruction 2014   • Diabetes mellitus    • Epididymal mass    • Essential hypertension 2021   • Follicular adenoma of thyroid gland 2021    RIGHT   • Hypogonadism, testicular    • Mixed hyperlipidemia 2021   • Paroxysmal atrial fibrillation 7/3/2021         ALLERGY  No Known Allergies       SURGICALHX  Past Surgical History:   Procedure Laterality Date   • COLONOSCOPY      15 YEARS AGO    • MOLE REMOVAL  2014-HAS NOT REC'D PATHOLOGY RESULTS YET   • THYROIDECTOMY, PARTIAL     • TOTAL KNEE ARTHROPLASTY Right           SOC  Social History     Socioeconomic History   • Marital status:      Spouse name: Not on file   • Number of children: Not on file   • Years of education: Not on file   • Highest education level: Not on file   Tobacco Use   • Smoking status: Former Smoker     Packs/day: 2.00     Years: 15.00     Pack years: 30.00     Types: Cigarettes     Start date:      Quit date:      Years since quittin.5   • Smokeless tobacco: Never Used   • Tobacco comment:  5/1/14-QUIT 25-30 YEARS AGO   Vaping Use   • Vaping Use: Never used   Substance and Sexual Activity   • Alcohol use: Not Currently   • Drug use: Never   • Sexual activity: Defer         FAMHX  Family History   Problem Relation Age of Onset   • Clotting disorder Mother    • Hypertension Mother    • Emphysema Father           MEDSIGONLY  Current Outpatient Medications on File Prior to Visit   Medication Sig   • alendronate (FOSAMAX) 70 MG tablet Take 1 tablet by mouth Every 7 (Seven) Days. (Patient taking differently: Take 70 mg by mouth Every 7 (Seven) Days. SUNDAY)   • apixaban (ELIQUIS) 5 MG tablet tablet Take 1 tablet by mouth Every 12 (Twelve) Hours for 30 days. Indications: Atrial Fibrillation   • aspirin (aspirin) 81 MG EC tablet Take 1 tablet by mouth Daily.   • atorvastatin (LIPITOR) 10 MG tablet Take 10 mg by mouth Daily.   • cetirizine (zyrTEC) 10 MG tablet Take 1 tablet by mouth Daily.   • Cholecalciferol 25 MCG (1000 UT) capsule Take 1,000 Units by mouth Daily.   • cyanocobalamin (VITAMIN B-12) 1000 MCG tablet Vitamin B-12  1,000 mcg tablet   • finasteride (PROSCAR) 5 MG tablet Take 5 mg by mouth Daily.   • fluticasone (FLONASE) 50 MCG/ACT nasal spray 2 sprays into the nostril(s) as directed by provider Daily. (Patient taking differently: 2 sprays into the nostril(s) as directed by provider 2 (Two) Times a Day As Needed.)   • gabapentin (NEURONTIN) 600 MG tablet Take 600 mg by mouth 3 (Three) Times a Day.   • hydroCHLOROthiazide (HYDRODIURIL) 12.5 MG tablet Take 12.5 mg by mouth Daily.   • lisinopril (PRINIVIL,ZESTRIL) 20 MG tablet Take 20 mg by mouth Daily.   • metFORMIN (GLUCOPHAGE) 1000 MG tablet Take 1,000 mg by mouth 2 (Two) Times a Day With Meals. Take 1 PO BID   • metoprolol succinate XL (TOPROL-XL) 25 MG 24 hr tablet Take 1 tablet by mouth Every 12 (Twelve) Hours for 30 days.   • tamsulosin (FLOMAX) 0.4 MG capsule 24 hr capsule Take 1 capsule by mouth Every Night.   • Testosterone 10 MG/ACT (2%)  "gel Place 40 mg on the skin as directed by provider Daily. (Patient taking differently: Place 40 mg on the skin as directed by provider Daily. TOPICALLY ON BOTH LEGS)   • [DISCONTINUED] alendronate (FOSAMAX) 70 MG tablet      No current facility-administered medications on file prior to visit.         Objective   /68   Pulse 64   Ht 167.6 cm (66\")   Wt 102 kg (225 lb)   BMI 36.32 kg/m²         Physical Exam  Constitutional:       Appearance: Normal appearance. He is obese.   HENT:      Head: Normocephalic.   Cardiovascular:      Rate and Rhythm: Normal rate. Rhythm regularly irregular.      Pulses: Normal pulses.      Heart sounds: Normal heart sounds. No murmur heard.     Pulmonary:      Effort: Pulmonary effort is normal.      Breath sounds: Normal breath sounds.   Musculoskeletal:      Cervical back: Neck supple.      Right lower leg: No edema.      Left lower leg: No edema.   Skin:     General: Skin is dry.      Capillary Refill: Capillary refill takes less than 2 seconds.   Neurological:      Mental Status: He is alert and oriented to person, place, and time.   Psychiatric:         Mood and Affect: Mood normal.         Behavior: Behavior normal.         ECG 12 Lead    Date/Time: 7/13/2021 12:30 PM  Performed by: Naomie Eckert APRN  Authorized by: Brian Muniz MD   Comparison: compared with previous ECG   Similar to previous ECG  Rhythm: atrial fibrillation  BPM: 59    Clinical impression: abnormal EKG          Result Review :   The following data was reviewed by: KENNETH Santiago on 07/13/2021:  No results found for: PROBNP  CMP    CMP 7/3/21   Glucose 120 (A)   Glucose    BUN 11   Creatinine 0.93   eGFR Non African Am 79   Sodium 142   Potassium 3.7   Chloride 105   Calcium 10.4   Albumin    Total Bilirubin    Alkaline Phosphatase    AST (SGOT)    ALT (SGPT)    (A) Abnormal value            CBC w/diff    CBC w/Diff 7/3/21   WBC 10.08   RBC 4.29   Hemoglobin 14.1   Hematocrit 40.9 "   MCV 95.3   MCH 32.9   MCHC 34.5   RDW 12.5   Platelets 207   Neutrophil Rel % 66.2   Immature Granulocyte Rel % 0.3   Lymphocyte Rel % 19.5 (A)   Monocyte Rel % 9.8   Eosinophil Rel % 3.4   Basophil Rel % 0.8   (A) Abnormal value             Lab Results   Component Value Date    TSH 1.470 05/20/2021      Lab Results   Component Value Date    FREET4 1.1 05/20/2021      No results found for: DDIMERQUANT  Magnesium   Date Value Ref Range Status   07/03/2021 2.0 1.6 - 2.4 mg/dL Final      No results found for: DIGOXIN   Lab Results   Component Value Date    TROPONINT <0.010 07/03/2021               Results for orders placed during the hospital encounter of 07/02/21    Adult Transthoracic Echo Complete W/ Cont if Necessary Per Protocol    Interpretation Summary  1.  Normal left ventricular systolic function.  2.  Fibrocalcific mitral and aortic valves.  3.  Mild tricuspid regurgitation and trace mitral regurgitation.  4.  Trace aortic regurgitation.  5.  Biatrial enlargement noted.            Assessment and Plan {CC Problem List  Visit Diagnosis  ROS  Review (Popup)  Health Maintenance  Quality  BestPractice  Medications  SmartSets  SnapShot Encounters  Media :23}   Diagnoses and all orders for this visit:    1. Paroxysmal atrial fibrillation (Primary)  EKG performed in office today shows patient is back in atrial fibrillation, rate is controlled, continue current dose of metoprolol for rate control and Eliquis for stroke prevention.   -     ECG 12 Lead    2. Essential hypertension   Currently within normal range and no adverse effects to the medication, continue current dose of lisinopril and hydrochlorothiazide.    3. Mixed hyperlipidemia   Cholesterol is managed by PCP, continue current dose of Lipitor.        Follow Up   Return for Follow up with Dr Muniz in April 2022.    Patient was given instructions and counseling regarding his condition or for health maintenance advice. Please see specific  information pulled into the AVS if appropriate.              Naomie Eckert, APRN  07/13/21  11:31 EDT    Dictated Utilizing Dragon Dictation

## 2021-07-13 NOTE — PATIENT INSTRUCTIONS
Atrial Fibrillation    Atrial fibrillation is a type of irregular or rapid heartbeat (arrhythmia). In atrial fibrillation, the top part of the heart (atria) beats in an irregular pattern. This makes the heart unable to pump blood normally and effectively.  The goal of treatment is to prevent blood clots from forming, control your heart rate, or restore your heartbeat to a normal rhythm. If this condition is not treated, it can cause serious problems, such as a weakened heart muscle (cardiomyopathy) or a stroke.  What are the causes?  This condition is often caused by medical conditions that damage the heart's electrical system. These include:  · High blood pressure (hypertension). This is the most common cause.  · Certain heart problems or conditions, such as heart failure, coronary artery disease, heart valve problems, or heart surgery.  · Diabetes.  · Overactive thyroid (hyperthyroidism).  · Obesity.  · Chronic kidney disease.  In some cases, the cause of this condition is not known.  What increases the risk?  This condition is more likely to develop in:  · Older people.  · People who smoke.  · Athletes who do endurance exercise.  · People who have a family history of atrial fibrillation.  · Men.  · People who use drugs.  · People who drink a lot of alcohol.  · People who have lung conditions, such as emphysema, pneumonia, or COPD.  · People who have obstructive sleep apnea.  What are the signs or symptoms?  Symptoms of this condition include:  · A feeling that your heart is racing or beating irregularly.  · Discomfort or pain in your chest.  · Shortness of breath.  · Sudden light-headedness or weakness.  · Tiring easily during exercise or activity.  · Fatigue.  · Syncope (fainting).  · Sweating.  In some cases, there are no symptoms.  How is this diagnosed?  Your health care provider may detect atrial fibrillation when taking your pulse. If detected, this condition may be diagnosed with:  · An electrocardiogram  (ECG) to check electrical signals of the heart.  · An ambulatory cardiac monitor to record your heart's activity for a few days.  · A transthoracic echocardiogram (TTE) to create pictures of your heart.  · A transesophageal echocardiogram (ABRAHAN) to create even closer pictures of your heart.  · A stress test to check your blood supply while you exercise.  · Imaging tests, such as a CT scan or chest X-ray.  · Blood tests.  How is this treated?  Treatment depends on underlying conditions and how you feel when you experience atrial fibrillation. This condition may be treated with:  · Medicines to prevent blood clots or to treat heart rate or heart rhythm problems.  · Electrical cardioversion to reset the heart's rhythm.  · A pacemaker to correct abnormal heart rhythm.  · Ablation to remove the heart tissue that sends abnormal signals.  · Left atrial appendage closure to seal the area where blood clots can form.  In some cases, underlying conditions will be treated.  Follow these instructions at home:  Medicines  · Take over-the counter and prescription medicines only as told by your health care provider.  · Do not take any new medicines without talking to your health care provider.  · If you are taking blood thinners:  ? Talk with your health care provider before you take any medicines that contain aspirin or NSAIDs, such as ibuprofen. These medicines increase your risk for dangerous bleeding.  ? Take your medicine exactly as told, at the same time every day.  ? Avoid activities that could cause injury or bruising, and follow instructions about how to prevent falls.  ? Wear a medical alert bracelet or carry a card that lists what medicines you take.  Lifestyle         · Do not use any products that contain nicotine or tobacco, such as cigarettes, e-cigarettes, and chewing tobacco. If you need help quitting, ask your health care provider.  · Eat heart-healthy foods. Talk with a dietitian to make an eating plan that is  "right for you.  · Exercise regularly as told by your health care provider.  · Do not drink alcohol.  · Lose weight if you are overweight.  · Do not use drugs, including cannabis.  General instructions  · If you have obstructive sleep apnea, manage your condition as told by your health care provider.  · Do not use diet pills unless your health care provider approves. Diet pills can make heart problems worse.  · Keep all follow-up visits as told by your health care provider. This is important.  Contact a health care provider if you:  · Notice a change in the rate, rhythm, or strength of your heartbeat.  · Are taking a blood thinner and you notice more bruising.  · Tire more easily when you exercise or do heavy work.  · Have a sudden change in weight.  Get help right away if you have:    · Chest pain, abdominal pain, sweating, or weakness.  · Trouble breathing.  · Side effects of blood thinners, such as blood in your vomit, stool, or urine, or bleeding that cannot stop.  · Any symptoms of a stroke. \"BE FAST\" is an easy way to remember the main warning signs of a stroke:  ? B - Balance. Signs are dizziness, sudden trouble walking, or loss of balance.  ? E - Eyes. Signs are trouble seeing or a sudden change in vision.  ? F - Face. Signs are sudden weakness or numbness of the face, or the face or eyelid drooping on one side.  ? A - Arms. Signs are weakness or numbness in an arm. This happens suddenly and usually on one side of the body.  ? S - Speech. Signs are sudden trouble speaking, slurred speech, or trouble understanding what people say.  ? T - Time. Time to call emergency services. Write down what time symptoms started.  · Other signs of a stroke, such as:  ? A sudden, severe headache with no known cause.  ? Nausea or vomiting.  ? Seizure.  These symptoms may represent a serious problem that is an emergency. Do not wait to see if the symptoms will go away. Get medical help right away. Call your local emergency " services (911 in the [place] Do not drive yourself to the hospital.  Summary  · Atrial fibrillation is a type of irregular or rapid heartbeat (arrhythmia).  · Symptoms include a feeling that your heart is beating fast or irregularly.  · You may be given medicines to prevent blood clots or to treat heart rate or heart rhythm problems.  · Get help right away if you have signs or symptoms of a stroke.  · Get help right away if you cannot catch your breath or have chest pain or pressure.  This information is not intended to replace advice given to you by your health care provider. Make sure you discuss any questions you have with your health care provider.  Document Revised: 06/10/2020 Document Reviewed: 06/10/2020  Elsevier Patient Education © 2021 [employer]

## 2021-07-15 VITALS
SYSTOLIC BLOOD PRESSURE: 130 MMHG | DIASTOLIC BLOOD PRESSURE: 82 MMHG | TEMPERATURE: 98.4 F | HEART RATE: 92 BPM | HEIGHT: 68 IN | BODY MASS INDEX: 34.43 KG/M2 | WEIGHT: 227.19 LBS | OXYGEN SATURATION: 97 %

## 2021-07-28 RX ORDER — METOPROLOL SUCCINATE 25 MG/1
25 TABLET, EXTENDED RELEASE ORAL EVERY 12 HOURS SCHEDULED
Qty: 180 TABLET | Refills: 3 | Status: SHIPPED | OUTPATIENT
Start: 2021-07-28 | End: 2022-04-18 | Stop reason: SDUPTHER

## 2021-08-04 ENCOUNTER — HOSPITAL ENCOUNTER (OUTPATIENT)
Dept: CT IMAGING | Facility: HOSPITAL | Age: 81
Discharge: HOME | End: 2021-08-04
Admitting: FAMILY MEDICINE

## 2021-08-04 DIAGNOSIS — R19.03 RIGHT LOWER QUADRANT ABDOMINAL MASS: ICD-10-CM

## 2021-08-04 PROCEDURE — 74176 CT ABD & PELVIS W/O CONTRAST: CPT

## 2021-08-05 ENCOUNTER — HOSPITAL ENCOUNTER (OUTPATIENT)
Dept: BONE DENSITY | Facility: HOSPITAL | Age: 81
Discharge: HOME | End: 2021-08-05

## 2021-08-05 DIAGNOSIS — M81.0 OSTEOPOROSIS, UNSPECIFIED OSTEOPOROSIS TYPE, UNSPECIFIED PATHOLOGICAL FRACTURE PRESENCE: ICD-10-CM

## 2021-08-06 ENCOUNTER — TELEPHONE (OUTPATIENT)
Dept: FAMILY MEDICINE CLINIC | Facility: CLINIC | Age: 81
End: 2021-08-06

## 2021-08-06 ENCOUNTER — OFFICE VISIT (OUTPATIENT)
Dept: SURGERY | Facility: CLINIC | Age: 81
End: 2021-08-06

## 2021-08-06 VITALS — RESPIRATION RATE: 16 BRPM | HEIGHT: 66 IN | WEIGHT: 227.6 LBS | BODY MASS INDEX: 36.58 KG/M2

## 2021-08-06 DIAGNOSIS — R19.00 ABDOMINAL MASS, UNSPECIFIED ABDOMINAL LOCATION: Primary | ICD-10-CM

## 2021-08-06 DIAGNOSIS — R19.01 RIGHT UPPER QUADRANT ABDOMINAL MASS: Primary | ICD-10-CM

## 2021-08-06 PROCEDURE — 99203 OFFICE O/P NEW LOW 30 MIN: CPT | Performed by: SURGERY

## 2021-08-06 NOTE — TELEPHONE ENCOUNTER
Caller: JHON RAMIREZ    Relationship: Emergency Contact    Best call back number: [phone]     Medication needed:   Requested Prescriptions     Pending Prescriptions Disp Refills   • gabapentin (NEURONTIN) 600 MG tablet       Sig: Take 1 tablet by mouth 3 (Three) Times a Day.   • Cholecalciferol 25 MCG (1000 UT) capsule       Sig: Take 1 capsule by mouth Daily.   • finasteride (PROSCAR) 5 MG tablet       Sig: Take 1 tablet by mouth Daily.   • lisinopril (PRINIVIL,ZESTRIL) 20 MG tablet       Sig: Take 1 tablet by mouth Daily.   • metFORMIN (GLUCOPHAGE) 1000 MG tablet       Sig: Take 1 tablet by mouth 2 (Two) Times a Day With Meals. Take 1 PO BID       When do you need the refill by: ASAP    What additional details did the patient provide when requesting the medication: PATIENT HAS 3 DAYS LEFT    Does the patient have less than a 3 day supply:  [] Yes  [x] No    What is the patient's preferred pharmacy: [facility] -  [place], [place] - [address] [phone] [facility] [phone]

## 2021-08-06 NOTE — PROGRESS NOTES
Chief Complaint:  Mass (ABDOMINAL)         History of Present Illness  Steve Bryson Jr. is a 80 y.o. male referred by Ok Moore DO for an intra-abdominal mass found incidentally on a CT abdomen pelvis. CT abdomen pelvis was done because the patient has 2 lumbar compression fractures. Copy and paste of the radiology report will be available below. I reviewed the CT scan images myself. 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 Patient denies any abdominal pain. Does not have any abdominal symptoms. Patient last had a colonoscopy 8 to 10 years ago. Patient is leaving to go to [place] for the winter sometime in the first week or two in September.    Allergies: Patient has no known allergies.      Current Outpatient Medications:   •  alendronate (FOSAMAX) 70 MG tablet, Take 1 tablet by mouth Every 7 (Seven) Days. (Patient taking differently: Take 70 mg by mouth Every 7 (Seven) Days. SUNDAY), Disp: 13 tablet, Rfl: 3  •  apixaban (ELIQUIS) 5 MG tablet tablet, Take 1 tablet by mouth Every 12 (Twelve) Hours for 30 days. Indications: Atrial Fibrillation, Disp: 180 tablet, Rfl: 3  •  aspirin (aspirin) 81 MG EC tablet, Take 1 tablet by mouth Daily., Disp: 90 tablet, Rfl: 3  •  atorvastatin (LIPITOR) 10 MG tablet, Take 10 mg by mouth Daily., Disp: , Rfl:   •  cetirizine (zyrTEC) 10 MG tablet, Take 1 tablet by mouth Daily., Disp: 90 tablet, Rfl: 3  •  Cholecalciferol 25 MCG (1000 UT) capsule, Take 1,000 Units by mouth Daily., Disp: , Rfl:   •  cyanocobalamin (VITAMIN B-12) 1000 MCG tablet, Vitamin B-12  1,000 mcg tablet, Disp: , Rfl:   •  finasteride (PROSCAR) 5 MG tablet, Take 5 mg by mouth Daily., Disp: , Rfl:   •  fluticasone (FLONASE) 50 MCG/ACT nasal spray, 2 sprays into the nostril(s) as directed by provider Daily. (Patient taking differently: 2 sprays into the nostril(s) as  directed by provider 2 (Two) Times a Day As Needed.), Disp: 48 g, Rfl: 3  •  gabapentin (NEURONTIN) 600 MG tablet, Take 600 mg by mouth 3 (Three) Times a Day., Disp: , Rfl:   •  hydroCHLOROthiazide (HYDRODIURIL) 12.5 MG tablet, Take 12.5 mg by mouth Daily., Disp: , Rfl:   •  lisinopril (PRINIVIL,ZESTRIL) 20 MG tablet, Take 20 mg by mouth Daily., Disp: , Rfl:   •  metFORMIN (GLUCOPHAGE) 1000 MG tablet, Take 1,000 mg by mouth 2 (Two) Times a Day With Meals. Take 1 PO BID, Disp: , Rfl:   •  metoprolol succinate XL (TOPROL-XL) 25 MG 24 hr tablet, Take 1 tablet by mouth Every 12 (Twelve) Hours for 30 days., Disp: 180 tablet, Rfl: 3  •  tamsulosin (FLOMAX) 0.4 MG capsule 24 hr capsule, Take 1 capsule by mouth Every Night., Disp: , Rfl:   •  Testosterone 10 MG/ACT (2%) gel, Place 40 mg on the skin as directed by provider Daily. (Patient taking differently: Place 40 mg on the skin as directed by provider Daily. TOPICALLY ON BOTH LEGS), Disp: 60 g, Rfl: 2    Past Medical History:   • Arrhythmia   • BPH with urinary obstruction   • Diabetes mellitus   • Epididymal mass   • Essential hypertension   • Follicular adenoma of thyroid gland    RIGHT   • Hypogonadism, testicular   • Mixed hyperlipidemia   • Paroxysmal atrial fibrillation        Past Surgical History:   • COLONOSCOPY    15 YEARS AGO    • MOLE REMOVAL    14-HAS NOT REC'D PATHOLOGY RESULTS YET   • THYROIDECTOMY, PARTIAL   • TOTAL KNEE ARTHROPLASTY       Family History:   Family History   Problem Relation Age of Onset   • Clotting disorder Mother    • Hypertension Mother    • Emphysema Father         Social History:  Social History     Tobacco Use   • Smoking status: Former Smoker     Packs/day: 2.00     Years: 15.00     Pack years: 30.00     Types: Cigarettes     Start date:      Quit date:      Years since quittin.6   • Smokeless tobacco: Never Used   • Tobacco comment: 14-QUIT 25-30 YEARS AGO   Substance Use Topics   • Alcohol use: Not Currently  "      Objective     Vital Signs:  Resp 16   Ht 167.6 cm (66\")   Wt 103 kg (227 lb 9.6 oz)   BMI 36.74 kg/m²   • Constitutional: wife present, alert, no acute distress, reliable historian  • HENT:  NCAT, no visible deformities or lesions  • Eyes:  sclerae clear, conjunctivae clear, EOMI  • Neck:  normal appearance, no masses, trachea midline  • Respiratory:  breathing not labored, respiratory effort appears normal  • Cardiovascular:  heart regular rate and rhythm  • Abdomen:  soft, nontender, nondistended    • Skin and subcutaneous tissue:  no visible concerning rashes or lesions, no jaundice  • Musculoskeletal: moving all extremities symmetrically and purposefully  • Neurologic:  no obvious motor or sensory deficits, normal gait, able to stand without difficulty, cerebellar function without any obvious abnormalities, alert & oriented x 3, speech clear  • Psychiatric:  judgment and insight intact, mood normal, affect appropriate, cooperative    TECHNIQUE:    CT images were created without intravenous contrast.       PROTOCOL:     Standard imaging protocol performed                 RADIATION:      DLP: 913.6mGy*cm               Automated exposure control was utilized to minimize radiation dose.      FINDINGS:          Abdomen:  Included lung bases are predominately clear.     Liver, spleen, adrenal glands, pancreas have an unremarkable unenhanced appearance.  Uncomplicated   cholelithiasis.  The bowel loops are nondilated.  Simple renal cysts, largest exophytic from the   mid left kidney measures 3 cm.     There is a fat attenuation mass in the anterolateral right abdomen.  The mass has a thin   encapsulation.  The mass measures approximately 18.6 by 10.9 by 18.4 cm.  The mass does exert some   mass effect on the inferior tip of the liver and on the right side of the colon.  There is central   fat attenuation with rim calcification, possibly related to fat necrosis or an old internal   hemorrhage.     Pelvis:  No " pelvic mass or fluid.  No aggressive appearing bone change.       CONCLUSION: Large fat attenuation, thinly in capsulated mass in the right side of the abdomen.    There is fairly large area of central rim calcified fat, that may be related to fat necrosis or old   internal hemorrhage.  This lesion could represent a large lipoma, however liposarcoma could have a   similar appearance.     Other findings as above.      FLORENTIN HAMEED MD         Electronically Signed and Approved By: FLORENTIN HAMEED MD on 8/04/2021 at 10:57       [x]  Radiology    Assessment:  Intra-abdominal mass    Plan:  CT guided biopsy. I will call the patient on the telephone once I have the biopsy result.    Yousif Brice MD  08/06/2021    Electronically signed by Yousif Brice MD, 08/06/21, 5:06 PM EDT.

## 2021-08-09 RX ORDER — GABAPENTIN 600 MG/1
600 TABLET ORAL 3 TIMES DAILY
Qty: 90 TABLET | Refills: 2 | Status: SHIPPED | OUTPATIENT
Start: 2021-08-09 | End: 2021-08-30 | Stop reason: SDUPTHER

## 2021-08-09 RX ORDER — TAMSULOSIN HYDROCHLORIDE 0.4 MG/1
1 CAPSULE ORAL NIGHTLY
Qty: 90 CAPSULE | Refills: 3 | Status: SHIPPED | OUTPATIENT
Start: 2021-08-09 | End: 2022-04-18 | Stop reason: SDUPTHER

## 2021-08-09 RX ORDER — LISINOPRIL 20 MG/1
20 TABLET ORAL DAILY
Qty: 90 TABLET | Refills: 3 | Status: SHIPPED | OUTPATIENT
Start: 2021-08-09 | End: 2022-04-18 | Stop reason: SDUPTHER

## 2021-08-09 RX ORDER — FINASTERIDE 5 MG/1
5 TABLET, FILM COATED ORAL DAILY
Qty: 90 TABLET | Refills: 3 | Status: SHIPPED | OUTPATIENT
Start: 2021-08-09 | End: 2022-04-18 | Stop reason: SDUPTHER

## 2021-08-09 NOTE — TELEPHONE ENCOUNTER
Caller: JHON RAMIREZ    Relationship: Emergency Contact    Best call back number:[phone]     Medication needed:   Requested Prescriptions     Pending Prescriptions Disp Refills   • tamsulosin (FLOMAX) 0.4 MG capsule 24 hr capsule 30 capsule      Sig: Take 1 capsule by mouth Every Night.       When do you need the refill by: TODAY    What additional details did the patient provide when requesting the medication:     Does the patient have less than a 3 day supply:  [x] Yes  [] No    What is the patient's preferred pharmacy:      [facility]  [address] [place] [phone]

## 2021-08-09 NOTE — TELEPHONE ENCOUNTER
SADIA Tuttle, consent reviewed and appropriate.  Please inform patient that prescription for gabapentin is available upfront for pickup.  Thank you. Other prescriptions have been sent to [facility].

## 2021-08-10 ENCOUNTER — TELEPHONE (OUTPATIENT)
Dept: SURGERY | Facility: CLINIC | Age: 81
End: 2021-08-10

## 2021-08-10 ENCOUNTER — TRANSCRIBE ORDERS (OUTPATIENT)
Dept: ADMINISTRATIVE | Facility: HOSPITAL | Age: 81
End: 2021-08-10

## 2021-08-10 DIAGNOSIS — R19.00 ABDOMINAL MASS, UNSPECIFIED ABDOMINAL LOCATION: Primary | ICD-10-CM

## 2021-08-10 DIAGNOSIS — Z79.01 ANTICOAGULANT LONG-TERM USE: ICD-10-CM

## 2021-08-10 DIAGNOSIS — Z01.818 OTHER SPECIFIED PRE-OPERATIVE EXAMINATION: Primary | ICD-10-CM

## 2021-08-10 NOTE — TELEPHONE ENCOUNTER
Patient called and said he is confused about the information you just went over with him and needs you to go over it again with him please.

## 2021-08-11 DIAGNOSIS — E29.1 HYPOGONADISM IN MALE: ICD-10-CM

## 2021-08-11 RX ORDER — TESTOSTERONE 10 MG/.5G
40 GEL, METERED TOPICAL DAILY
Qty: 60 G | Refills: 2 | Status: SHIPPED | OUTPATIENT
Start: 2021-08-11 | End: 2021-08-30 | Stop reason: SDUPTHER

## 2021-08-11 NOTE — TELEPHONE ENCOUNTER
As per, UDS, and consent reviewed and appropriate.  Please inform patient a prescription is available upfront for pickup.

## 2021-08-11 NOTE — TELEPHONE ENCOUNTER
Caller: JHON RAMIREZ    Relationship: Emergency Contact    Best call back number: [phone]    Medication needed:   Requested Prescriptions     Pending Prescriptions Disp Refills   • Testosterone 10 MG/ACT (2%) gel 60 g 2     Sig: Place 40 mg on the skin as directed by provider Daily.       When do you need the refill by: 08/11/2021    Does the patient have less than a 3 day supply:  [x] Yes  [] No    What is the patient's preferred pharmacy: [facility] [address] [facility] [phone] [facility] [phone] FX

## 2021-08-16 ENCOUNTER — LAB (OUTPATIENT)
Dept: LAB | Facility: HOSPITAL | Age: 81
End: 2021-08-16

## 2021-08-16 DIAGNOSIS — Z79.01 ANTICOAGULANT LONG-TERM USE: ICD-10-CM

## 2021-08-16 DIAGNOSIS — Z01.818 OTHER SPECIFIED PRE-OPERATIVE EXAMINATION: ICD-10-CM

## 2021-08-16 DIAGNOSIS — R19.00 ABDOMINAL MASS, UNSPECIFIED ABDOMINAL LOCATION: ICD-10-CM

## 2021-08-16 DIAGNOSIS — E29.1 HYPOGONADISM IN MALE: ICD-10-CM

## 2021-08-16 DIAGNOSIS — Z51.81 MEDICATION MONITORING ENCOUNTER: ICD-10-CM

## 2021-08-16 LAB
APTT PPP: 27.5 SECONDS (ref 22.2–34.2)
BASOPHILS # BLD AUTO: 0.08 10*3/MM3 (ref 0–0.2)
BASOPHILS NFR BLD AUTO: 1.2 % (ref 0–1.5)
DEPRECATED RDW RBC AUTO: 50.7 FL (ref 37–54)
EOSINOPHIL # BLD AUTO: 0.36 10*3/MM3 (ref 0–0.4)
EOSINOPHIL NFR BLD AUTO: 5.3 % (ref 0.3–6.2)
ERYTHROCYTE [DISTWIDTH] IN BLOOD BY AUTOMATED COUNT: 13.9 % (ref 12.3–15.4)
HCT VFR BLD AUTO: 46 % (ref 37.5–51)
HGB BLD-MCNC: 15.1 G/DL (ref 13–17.7)
IMM GRANULOCYTES # BLD AUTO: 0.01 10*3/MM3 (ref 0–0.05)
IMM GRANULOCYTES NFR BLD AUTO: 0.1 % (ref 0–0.5)
INR PPP: 1.03 (ref 2–3)
LYMPHOCYTES # BLD AUTO: 1.63 10*3/MM3 (ref 0.7–3.1)
LYMPHOCYTES NFR BLD AUTO: 23.8 % (ref 19.6–45.3)
MCH RBC QN AUTO: 32.8 PG (ref 26.6–33)
MCHC RBC AUTO-ENTMCNC: 32.8 G/DL (ref 31.5–35.7)
MCV RBC AUTO: 99.8 FL (ref 79–97)
MONOCYTES # BLD AUTO: 0.61 10*3/MM3 (ref 0.1–0.9)
MONOCYTES NFR BLD AUTO: 8.9 % (ref 5–12)
NEUTROPHILS NFR BLD AUTO: 4.15 10*3/MM3 (ref 1.7–7)
NEUTROPHILS NFR BLD AUTO: 60.7 % (ref 42.7–76)
NRBC BLD AUTO-RTO: 0 /100 WBC (ref 0–0.2)
PLATELET # BLD AUTO: 221 10*3/MM3 (ref 140–450)
PMV BLD AUTO: 11.4 FL (ref 6–12)
PROTHROMBIN TIME: 11.3 SECONDS (ref 9.4–12)
RBC # BLD AUTO: 4.61 10*6/MM3 (ref 4.14–5.8)
SARS-COV-2 RNA RESP QL NAA+PROBE: NOT DETECTED
TESTOST SERPL-MCNC: 518 NG/DL (ref 193–740)
WBC # BLD AUTO: 6.84 10*3/MM3 (ref 3.4–10.8)

## 2021-08-16 PROCEDURE — 85730 THROMBOPLASTIN TIME PARTIAL: CPT

## 2021-08-16 PROCEDURE — 85610 PROTHROMBIN TIME: CPT

## 2021-08-16 PROCEDURE — 36415 COLL VENOUS BLD VENIPUNCTURE: CPT

## 2021-08-16 PROCEDURE — C9803 HOPD COVID-19 SPEC COLLECT: HCPCS

## 2021-08-16 PROCEDURE — 84403 ASSAY OF TOTAL TESTOSTERONE: CPT

## 2021-08-16 PROCEDURE — 85025 COMPLETE CBC W/AUTO DIFF WBC: CPT

## 2021-08-16 PROCEDURE — U0005 INFEC AGEN DETEC AMPLI PROBE: HCPCS

## 2021-08-16 PROCEDURE — U0003 INFECTIOUS AGENT DETECTION BY NUCLEIC ACID (DNA OR RNA); SEVERE ACUTE RESPIRATORY SYNDROME CORONAVIRUS 2 (SARS-COV-2) (CORONAVIRUS DISEASE [COVID-19]), AMPLIFIED PROBE TECHNIQUE, MAKING USE OF HIGH THROUGHPUT TECHNOLOGIES AS DESCRIBED BY CMS-2020-01-R: HCPCS

## 2021-08-18 ENCOUNTER — HOSPITAL ENCOUNTER (OUTPATIENT)
Dept: BONE DENSITY | Facility: HOSPITAL | Age: 81
Discharge: HOME | End: 2021-08-18
Admitting: FAMILY MEDICINE

## 2021-08-18 PROCEDURE — 77080 DXA BONE DENSITY AXIAL: CPT

## 2021-08-19 ENCOUNTER — HOSPITAL ENCOUNTER (OUTPATIENT)
Dept: CT IMAGING | Facility: HOSPITAL | Age: 81
Discharge: HOME | End: 2021-08-19
Admitting: SURGERY

## 2021-08-19 VITALS
HEIGHT: 66 IN | WEIGHT: 225 LBS | BODY MASS INDEX: 36.16 KG/M2 | HEART RATE: 69 BPM | DIASTOLIC BLOOD PRESSURE: 91 MMHG | RESPIRATION RATE: 16 BRPM | OXYGEN SATURATION: 98 % | SYSTOLIC BLOOD PRESSURE: 145 MMHG

## 2021-08-19 DIAGNOSIS — R19.00 ABDOMINAL MASS, UNSPECIFIED ABDOMINAL LOCATION: ICD-10-CM

## 2021-08-19 PROCEDURE — 77012 CT SCAN FOR NEEDLE BIOPSY: CPT

## 2021-08-19 PROCEDURE — 88305 TISSUE EXAM BY PATHOLOGIST: CPT | Performed by: SURGERY

## 2021-08-19 PROCEDURE — 25010000002 MIDAZOLAM PER 1 MG: Performed by: RADIOLOGY

## 2021-08-19 PROCEDURE — 77012 CT SCAN FOR NEEDLE BIOPSY: CPT | Performed by: RADIOLOGY

## 2021-08-19 PROCEDURE — 25010000002 FENTANYL CITRATE (PF) 50 MCG/ML SOLUTION: Performed by: RADIOLOGY

## 2021-08-19 RX ORDER — FENTANYL CITRATE 50 UG/ML
INJECTION, SOLUTION INTRAMUSCULAR; INTRAVENOUS
Status: DISPENSED
Start: 2021-08-19 | End: 2021-08-19

## 2021-08-19 RX ORDER — MIDAZOLAM HYDROCHLORIDE 1 MG/ML
INJECTION INTRAMUSCULAR; INTRAVENOUS
Status: COMPLETED | OUTPATIENT
Start: 2021-08-19 | End: 2021-08-19

## 2021-08-19 RX ORDER — LIDOCAINE HYDROCHLORIDE 20 MG/ML
INJECTION, SOLUTION INFILTRATION; PERINEURAL
Status: DISPENSED
Start: 2021-08-19 | End: 2021-08-19

## 2021-08-19 RX ORDER — MIDAZOLAM HYDROCHLORIDE 2 MG/2ML
INJECTION, SOLUTION INTRAMUSCULAR; INTRAVENOUS
Status: DISPENSED
Start: 2021-08-19 | End: 2021-08-19

## 2021-08-19 RX ORDER — FENTANYL CITRATE 50 UG/ML
INJECTION, SOLUTION INTRAMUSCULAR; INTRAVENOUS
Status: COMPLETED | OUTPATIENT
Start: 2021-08-19 | End: 2021-08-19

## 2021-08-19 RX ADMIN — MIDAZOLAM 1 MG: 1 INJECTION INTRAMUSCULAR; INTRAVENOUS at 09:35

## 2021-08-19 RX ADMIN — FENTANYL CITRATE 50 MCG: 50 INJECTION INTRAMUSCULAR; INTRAVENOUS at 09:36

## 2021-08-19 NOTE — NURSING NOTE
Lidocaine injected by Dr. Calderón.    MD aware. Md to bedside to assess patient. Will continue to monitor

## 2021-08-19 NOTE — DISCHARGE INSTR - ACTIVITY
No strenuous activity, sports, heavy lifting (nothing heavier than a gallon of milk) for 7 days.    Cold/Sinus

## 2021-08-20 LAB
CYTO UR: NORMAL
LAB AP CASE REPORT: NORMAL
LAB AP CLINICAL INFORMATION: NORMAL
PATH REPORT.FINAL DX SPEC: NORMAL
PATH REPORT.GROSS SPEC: NORMAL

## 2021-08-24 ENCOUNTER — LAB (OUTPATIENT)
Dept: FAMILY MEDICINE CLINIC | Facility: CLINIC | Age: 81
End: 2021-08-24

## 2021-08-24 DIAGNOSIS — E29.1 HYPOGONADISM IN MALE: Primary | ICD-10-CM

## 2021-08-24 DIAGNOSIS — E78.5 HYPERLIPIDEMIA, UNSPECIFIED HYPERLIPIDEMIA TYPE: ICD-10-CM

## 2021-08-24 DIAGNOSIS — E11.65 TYPE 2 DIABETES MELLITUS WITH HYPERGLYCEMIA, WITHOUT LONG-TERM CURRENT USE OF INSULIN (HCC): ICD-10-CM

## 2021-08-24 DIAGNOSIS — I10 ESSENTIAL HYPERTENSION: ICD-10-CM

## 2021-08-24 LAB — HBA1C MFR BLD: 5.41 % (ref 4.8–5.6)

## 2021-08-24 PROCEDURE — 83036 HEMOGLOBIN GLYCOSYLATED A1C: CPT | Performed by: FAMILY MEDICINE

## 2021-08-24 PROCEDURE — 80061 LIPID PANEL: CPT | Performed by: FAMILY MEDICINE

## 2021-08-24 PROCEDURE — 80053 COMPREHEN METABOLIC PANEL: CPT | Performed by: FAMILY MEDICINE

## 2021-08-24 PROCEDURE — 36415 COLL VENOUS BLD VENIPUNCTURE: CPT | Performed by: FAMILY MEDICINE

## 2021-08-25 LAB
ALBUMIN SERPL-MCNC: 4.1 G/DL (ref 3.5–5.2)
ALBUMIN/GLOB SERPL: 1.6 G/DL
ALP SERPL-CCNC: 79 U/L (ref 39–117)
ALT SERPL W P-5'-P-CCNC: 21 U/L (ref 1–41)
ANION GAP SERPL CALCULATED.3IONS-SCNC: 8.2 MMOL/L (ref 5–15)
AST SERPL-CCNC: 22 U/L (ref 1–40)
BILIRUB SERPL-MCNC: 0.7 MG/DL (ref 0–1.2)
BUN SERPL-MCNC: 10 MG/DL (ref 8–23)
BUN/CREAT SERPL: 10.5 (ref 7–25)
CALCIUM SPEC-SCNC: 9.9 MG/DL (ref 8.6–10.5)
CHLORIDE SERPL-SCNC: 106 MMOL/L (ref 98–107)
CHOLEST SERPL-MCNC: 108 MG/DL (ref 0–200)
CO2 SERPL-SCNC: 28.8 MMOL/L (ref 22–29)
CREAT SERPL-MCNC: 0.95 MG/DL (ref 0.76–1.27)
GFR SERPL CREATININE-BSD FRML MDRD: 77 ML/MIN/1.73
GLOBULIN UR ELPH-MCNC: 2.6 GM/DL
GLUCOSE SERPL-MCNC: 104 MG/DL (ref 65–99)
HDLC SERPL-MCNC: 26 MG/DL (ref 40–60)
LDLC SERPL CALC-MCNC: 59 MG/DL (ref 0–100)
LDLC/HDLC SERPL: 2.15 {RATIO}
POTASSIUM SERPL-SCNC: 4.6 MMOL/L (ref 3.5–5.2)
PROT SERPL-MCNC: 6.7 G/DL (ref 6–8.5)
SODIUM SERPL-SCNC: 143 MMOL/L (ref 136–145)
TRIGL SERPL-MCNC: 130 MG/DL (ref 0–150)
VLDLC SERPL-MCNC: 23 MG/DL (ref 5–40)

## 2021-08-27 ENCOUNTER — OFFICE VISIT (OUTPATIENT)
Dept: SURGERY | Facility: CLINIC | Age: 81
End: 2021-08-27

## 2021-08-27 VITALS — BODY MASS INDEX: 36.42 KG/M2 | RESPIRATION RATE: 14 BRPM | WEIGHT: 226.6 LBS | HEIGHT: 66 IN

## 2021-08-27 DIAGNOSIS — R19.00 ABDOMINAL MASS, UNSPECIFIED ABDOMINAL LOCATION: Primary | ICD-10-CM

## 2021-08-27 PROCEDURE — 99212 OFFICE O/P EST SF 10 MIN: CPT | Performed by: SURGERY

## 2021-08-27 RX ORDER — MELATONIN
COMMUNITY
Start: 2021-08-10 | End: 2022-04-18 | Stop reason: SDUPTHER

## 2021-08-27 NOTE — PROGRESS NOTES
Patient was previously seen by me on 8/6/2021 for an intra-abdominal mass.  See that office note for details.  I decided together with the patient to perform a CT-guided biopsy.  The biopsy was done and showed benign fibroadipose tissue.  The chance for sampling area was discussed with the patient.  Although the mass has benign imaging characteristics there is a possibility for malignancy.  This was discussed with the patient.  The mass seems to be completely asymptomatic.  The patient says he is going to [place] for the winter and is not going to change his plans.  He leaves in a week or twp.  The patient will return to [place] sometime in April 2022.  I will order a CT abdomen pelvis to be done sometime in May 2022.  If the CT scan does not show any concerning changes, and the patient still does not have any symptoms then we will likely proceed with watcful observation.  Further decision making sometime next year after have follow-up CT scan result.

## 2021-08-30 ENCOUNTER — OFFICE VISIT (OUTPATIENT)
Dept: FAMILY MEDICINE CLINIC | Facility: CLINIC | Age: 81
End: 2021-08-30

## 2021-08-30 VITALS
HEART RATE: 63 BPM | WEIGHT: 224.9 LBS | HEIGHT: 66 IN | BODY MASS INDEX: 36.14 KG/M2 | TEMPERATURE: 97.5 F | RESPIRATION RATE: 20 BRPM | SYSTOLIC BLOOD PRESSURE: 118 MMHG | DIASTOLIC BLOOD PRESSURE: 60 MMHG | OXYGEN SATURATION: 96 %

## 2021-08-30 DIAGNOSIS — R19.01 RIGHT UPPER QUADRANT ABDOMINAL MASS: Primary | ICD-10-CM

## 2021-08-30 DIAGNOSIS — G89.29 CHRONIC BILATERAL LOW BACK PAIN WITHOUT SCIATICA: ICD-10-CM

## 2021-08-30 DIAGNOSIS — M54.6 CHRONIC BILATERAL THORACIC BACK PAIN: ICD-10-CM

## 2021-08-30 DIAGNOSIS — M81.0 OSTEOPOROSIS, UNSPECIFIED OSTEOPOROSIS TYPE, UNSPECIFIED PATHOLOGICAL FRACTURE PRESENCE: ICD-10-CM

## 2021-08-30 DIAGNOSIS — G89.29 CHRONIC BILATERAL THORACIC BACK PAIN: ICD-10-CM

## 2021-08-30 DIAGNOSIS — E29.1 HYPOGONADISM IN MALE: ICD-10-CM

## 2021-08-30 DIAGNOSIS — R73.03 PREDIABETES: ICD-10-CM

## 2021-08-30 DIAGNOSIS — E78.5 HYPERLIPIDEMIA, UNSPECIFIED HYPERLIPIDEMIA TYPE: ICD-10-CM

## 2021-08-30 DIAGNOSIS — M54.50 CHRONIC BILATERAL LOW BACK PAIN WITHOUT SCIATICA: ICD-10-CM

## 2021-08-30 PROBLEM — N40.1 BPH WITH URINARY OBSTRUCTION: Status: ACTIVE | Noted: 2021-08-30

## 2021-08-30 PROBLEM — J30.9 ALLERGIC RHINITIS: Status: ACTIVE | Noted: 2020-10-05

## 2021-08-30 PROBLEM — N13.8 BPH WITH URINARY OBSTRUCTION: Status: ACTIVE | Noted: 2021-08-30

## 2021-08-30 PROBLEM — N40.0 BENIGN PROSTATIC HYPERPLASIA: Status: ACTIVE | Noted: 2020-10-05

## 2021-08-30 PROBLEM — D34 BENIGN NEOPLASM OF THYROID GLAND: Status: ACTIVE | Noted: 2021-05-19

## 2021-08-30 PROBLEM — N50.89 OTHER SPECIFIED DISORDERS OF MALE GENITAL ORGANS: Status: ACTIVE | Noted: 2021-08-30

## 2021-08-30 PROBLEM — E11.9 TYPE 2 DIABETES MELLITUS: Status: ACTIVE | Noted: 2020-10-05

## 2021-08-30 PROCEDURE — 99214 OFFICE O/P EST MOD 30 MIN: CPT | Performed by: FAMILY MEDICINE

## 2021-08-30 RX ORDER — ASPIRIN 81 MG/1
81 TABLET ORAL DAILY
Qty: 90 TABLET | Refills: 3 | Status: SHIPPED | OUTPATIENT
Start: 2021-08-30 | End: 2022-04-18 | Stop reason: SDUPTHER

## 2021-08-30 RX ORDER — GABAPENTIN 600 MG/1
600 TABLET ORAL 3 TIMES DAILY
Qty: 90 TABLET | Refills: 0 | Status: SHIPPED | OUTPATIENT
Start: 2021-08-30 | End: 2021-11-17 | Stop reason: SDUPTHER

## 2021-08-30 RX ORDER — TESTOSTERONE 10 MG/.5G
40 GEL, METERED TOPICAL DAILY
Qty: 60 G | Refills: 0 | Status: SHIPPED | OUTPATIENT
Start: 2021-08-30 | End: 2021-10-15 | Stop reason: SDUPTHER

## 2021-08-30 NOTE — PROGRESS NOTES
"Chief Complaint  Follow up abdominal mass      Subjective          Steve Bryson Jr. presents to [facility]  History of Present Illness  Patient presents today to follow-up on an abdominal mass that was noted coincidentally on imaging.  He did see Dr. Brice.  He presented for follow-up on 8/27/2021.  The mass has benign imaging characteristics but there is always a possible malignancy.  Patient is asymptomatic and he is going to [place] in the middle of September.  He will have a follow-up CT when he returns in May 2022.  The biopsy showed scant benign fibroadipose tissue.  I did discuss with patient that there is always a possibility of malignancy however he would like to hold off on further evaluation and/or treatment at this time.  He had labs done.  His A1c has come down from 5.8% and is now 5.41%.  He was previously noted to be prediabetic.  His HDL is slightly low at 26.  LDL is at goal at 59.  He is taking atorvastatin 10 mg.  He is requesting a refill of testosterone as well as gabapentin.  He is taking testosterone for hypogonadism and gabapentin for chronic low back pain and thoracic back pain.  He takes 600 mg 3 times a day.  Parag, UDS, and controlled substance agreement have been reviewed and they are appropriate.  He is requesting a 1 month supply as he will be down in [place] and then will request refills from there.  Is requesting for the gabapentin testosterone to be sent to [facility] on [address] [address].  Objective   Vital Signs:   /60   Pulse 63   Temp 97.5 °F (36.4 °C) (Oral)   Resp 20   Ht 167.6 cm (66\")   Wt 102 kg (224 lb 14.4 oz)   SpO2 96%   BMI 36.30 kg/m²     Physical Exam    General: AAO ×3, no acute distress, pleasant  HEENT: Normocephalic, atraumatic  Cardiovascular: Regular rate and rhythm without appreciable murmur  Respiratory: Clear to auscultation bilaterally no RRW  Gastrointestinal: Soft nontender " nondistended with bowel sounds present  extremities: No edema  Neurologic: CN II through XII grossly intact   Psychiatric: Normal mood and affect  Result Review :                 Assessment and Plan    Diagnoses and all orders for this visit:    1. Right upper quadrant abdominal mass (Primary)    2. Hyperlipidemia, unspecified hyperlipidemia type    3. Prediabetes    4. Hypogonadism in male  -     Testosterone 10 MG/ACT (2%) gel; Place 40 mg on the skin as directed by provider Daily.  Dispense: 60 g; Refill: 0    5. Osteoporosis, unspecified osteoporosis type, unspecified pathological fracture presence    6. Chronic bilateral thoracic back pain    7. Chronic bilateral low back pain without sciatica    Other orders  -     aspirin (aspirin) 81 MG EC tablet; Take 1 tablet by mouth Daily.  Dispense: 90 tablet; Refill: 3  -     gabapentin (NEURONTIN) 600 MG tablet; Take 1 tablet by mouth 3 (Three) Times a Day.  Dispense: 90 tablet; Refill: 0    Plan as documented above.  Patient instructed to call with any questions or concerns.  Discussed continuing to manage prediabetes with dietary changes as he has done a good job lowering his A1c.  Discussed continuing managing chronic thoracic and low back pain with gabapentin as he has had a good response with this.  He does have osteoporosis.  Most recent DEXA scan showed osteopenia.  He is taking Fosamax once weekly in addition to calcium and vitamin D.     See note for indication of controlled substance.  Parag and drug screen have been reviewed.  Controlled substance agreement signed and scanned into chart.  After discussion of risk and benefits of medication, I have determined the patient is suitable for Rx while demonstrating the ability to safely follow and administer the medication plan.  Patient understands expectation that medication directions cannot be adjusted without a providers written approval.    Follow Up   Return in about 8 months (around 4/30/2022) for  hypogonadism.  Patient was given instructions and counseling regarding his condition or for health maintenance advice. Please see specific information pulled into the AVS if appropriate.

## 2021-10-15 DIAGNOSIS — E29.1 HYPOGONADISM IN MALE: ICD-10-CM

## 2021-10-15 RX ORDER — TESTOSTERONE 10 MG/.5G
40 GEL, METERED TOPICAL DAILY
Qty: 60 G | Refills: 2 | Status: SHIPPED | OUTPATIENT
Start: 2021-10-15 | End: 2021-10-15

## 2021-10-15 RX ORDER — TESTOSTERONE 10 MG/.5G
40 GEL, METERED TOPICAL DAILY
Qty: 60 G | Refills: 2 | Status: SHIPPED | OUTPATIENT
Start: 2021-10-15 | End: 2022-01-19 | Stop reason: SDUPTHER

## 2021-10-15 NOTE — TELEPHONE ENCOUNTER
Caller: Steve Bryson Jr.    Relationship: Self      Medication requested (name and dosage): Testosterone 10 MG/ACT (2%) gel    Pharmacy where request should be sent:     [facility]   [address] [place] [id number] [phone]      Additional details provided by patient: PATIENT STATES HE LEFT PHARMACY INFORMATION WITH FRANK MURRAY ON A CARD FOR WHERE HE IS IN [place] FOR THE NEXT 5 MONTHS    Best call back number: [phone]     Does the patient have less than a 3 day supply:  [x] Yes  [] No    Jaun Barrett Rep   10/15/21 14:52 EDT     PATIENT IS STATING THE [facility] IN [facility] [place] [facility] SO I LOOKED IT UP BUT IF THIS DOESN'T MATCH WHAT FRANK MURRAY HAS THEN WE NEED TO GO WITH WHAT HE HAS. PATIENT REQUESTED THAT REFILLS BE PUT ON THIS. HE STATES THAT HE IS ABLE IN [place] WITH REFILLS TO JUST GO AND PICK IT UP EACH MONTH.

## 2021-11-17 RX ORDER — GABAPENTIN 600 MG/1
600 TABLET ORAL 3 TIMES DAILY
Qty: 90 TABLET | Refills: 1 | Status: SHIPPED | OUTPATIENT
Start: 2021-11-17 | End: 2022-01-19 | Stop reason: SDUPTHER

## 2021-11-17 RX ORDER — GABAPENTIN 600 MG/1
600 TABLET ORAL 3 TIMES DAILY
Qty: 90 TABLET | Refills: 1 | Status: SHIPPED | OUTPATIENT
Start: 2021-11-17 | End: 2021-11-17

## 2021-11-17 NOTE — TELEPHONE ENCOUNTER
Caller: Steve Bryson Jr.    Relationship: Self    Best call back number: [phone]    Requested Prescriptions:   Requested Prescriptions     Pending Prescriptions Disp Refills   • gabapentin (NEURONTIN) 600 MG tablet 90 tablet 0     Sig: Take 1 tablet by mouth 3 (Three) Times a Day.        Pharmacy where request should be sent: [facility] [address] [place] [address] [address] - [phone]  - [phone] FX     Additional details provided by patient: WOULD LIKE REFILLS     Does the patient have less than a 3 day supply:  [x] Yes  [] No    Jaun Wiggins Rep   11/17/21 09:56 EST

## 2022-01-19 DIAGNOSIS — E29.1 HYPOGONADISM IN MALE: ICD-10-CM

## 2022-01-19 RX ORDER — GABAPENTIN 600 MG/1
600 TABLET ORAL 3 TIMES DAILY
Qty: 90 TABLET | Refills: 1 | Status: SHIPPED | OUTPATIENT
Start: 2022-01-19 | End: 2022-03-14 | Stop reason: SDUPTHER

## 2022-01-19 RX ORDER — TESTOSTERONE 10 MG/.5G
40 GEL, METERED TOPICAL DAILY
Qty: 60 G | Refills: 2 | Status: SHIPPED | OUTPATIENT
Start: 2022-01-19 | End: 2022-04-18 | Stop reason: SDUPTHER

## 2022-01-19 NOTE — TELEPHONE ENCOUNTER
aller: Steve Bryson Jr.    Relationship: Self    Best call back number: [phone]    Requested Prescriptions:   Requested Prescriptions     Pending Prescriptions Disp Refills   • gabapentin (NEURONTIN) 600 MG tablet 90 tablet 1     Sig: Take 1 tablet by mouth 3 (Three) Times a Day.   • Testosterone 10 MG/ACT (2%) gel 60 g 2     Sig: Place 40 mg on the skin as directed by provider Daily.        Pharmacy where request should be sent:    [facility]   [address] [place] [address]  [phone]- PHONE   [phone] - FAX   (DO NOT USE [facility] LIKE PREVIOUSLY DISCUSSED)     Additional details provided by patient: PATIENT WOULD LIKE TO HAVE 2 REFILLS ADDED TO THESE PRESCRIPTIONS AND THAT WAY HE DOESN'T HAVE TO CALL BACK IN UNTIL THEY RETURN TO [place]     Does the patient have less than a 3 day supply:  [x] Yes  [] No    Jaun GUERIN Rep   01/19/22 09:50 EST

## 2022-03-14 NOTE — TELEPHONE ENCOUNTER
Caller: ASHLEYJHON WERNER    Relationship: Emergency Contact    Best call back number: [phone]    Requested Prescriptions:   Requested Prescriptions     Pending Prescriptions Disp Refills   • gabapentin (NEURONTIN) 600 MG tablet 90 tablet 1     Sig: Take 1 tablet by mouth 3 (Three) Times a Day.        Pharmacy where request should be sent: [id number] [address] - [phone] [facility] [phone] FX     Additional details provided by patient: PATIENT HAS LESS THAN THREE DAY SUPPLY. PLEASE CALL SCRIPT INTO THE ABOVE PHARMACY ASAP.  Does the patient have less than a 3 day supply:  [x] Yes  [] No    Jaun Ocasio Rep   03/14/22 09:06 EDT

## 2022-03-16 RX ORDER — GABAPENTIN 600 MG/1
600 TABLET ORAL 3 TIMES DAILY
Qty: 90 TABLET | Refills: 0 | Status: SHIPPED | OUTPATIENT
Start: 2022-03-16 | End: 2022-04-13

## 2022-03-16 NOTE — TELEPHONE ENCOUNTER
Caller: JHON RAMIREZ    Relationship: Emergency Contact    Best call back number: [phone]    What is the best time to reach you: ANYTIME    Who are you requesting to speak with (clinical staff, provider,  specific staff member): CLINICAL      What was the call regarding: THE PATIENT'S WIFE STATED THE MEDICATION IS OUT. SHE WOULD LIKE A CALL BACK TO CONFIRM ONCE THIS HAS BEEN SENT    Do you require a callback: YES

## 2022-03-16 NOTE — TELEPHONE ENCOUNTER
Patient Instructions by Yvonne Mi RN at 07/14/17 11:55 AM     Author:  Yvonne Mi RN Service:  (none) Author Type:  Registered Nurse     Filed:  07/14/17 12:18 PM Encounter Date:  7/14/2017 Status:  Signed     :  Yvonne Mi RN (Registered Nurse)            TREATMENT ORDERS  Wear brace  No weight lifting to left upper extremity  OK for swimming    Follow-Up  Please make an appointment with Bong Bruce M.D. for 1 month(s)    Time left: 7/14/2017 11:56 AM     Next appointment:        Location:        Provider:         Please note: 24 hour notice for cancellation of appointment is required.      Do not hesitate to call if you are experiencing severe pain, worsening or change in your pain, have symptoms of infection (fever, warmth, redness, increased drainage), or have any other problem that concerns you ~ [phone] (or [phone] after hours).    Please remember when requesting refills on pain medication that the request should be made by Thursday at the latest. [facility] is open Monday-Friday, 8am-5pm, and closed on the weekends.  No narcotic refills will be filled after hours.    Additional Educational Resources:  For additional resources regarding your symptoms, diagnosis, or further health information, please visit the Health Resources section on [email or web address] or the Online Health Resources section in [employer].          Revision History        User Key Date/Time User Provider Type Action    > [N/A] 07/14/17 12:18 PM Yvonne Mi RN Registered Nurse Sign             Script sent

## 2022-03-17 RX ORDER — GABAPENTIN 600 MG/1
TABLET ORAL
Qty: 90 TABLET | Refills: 0 | OUTPATIENT
Start: 2022-03-17

## 2022-04-12 ENCOUNTER — OFFICE VISIT (OUTPATIENT)
Dept: CARDIOLOGY | Facility: CLINIC | Age: 82
End: 2022-04-12

## 2022-04-12 VITALS
SYSTOLIC BLOOD PRESSURE: 146 MMHG | WEIGHT: 230 LBS | HEART RATE: 72 BPM | HEIGHT: 66 IN | BODY MASS INDEX: 36.96 KG/M2 | DIASTOLIC BLOOD PRESSURE: 66 MMHG

## 2022-04-12 DIAGNOSIS — E78.2 HYPERLIPEMIA, MIXED: ICD-10-CM

## 2022-04-12 DIAGNOSIS — I25.10 ATHEROSCLEROSIS OF NATIVE CORONARY ARTERY OF NATIVE HEART WITHOUT ANGINA PECTORIS: ICD-10-CM

## 2022-04-12 DIAGNOSIS — I48.0 PAROXYSMAL ATRIAL FIBRILLATION: ICD-10-CM

## 2022-04-12 DIAGNOSIS — I10 HYPERTENSION, ESSENTIAL: Primary | ICD-10-CM

## 2022-04-12 PROCEDURE — 99214 OFFICE O/P EST MOD 30 MIN: CPT | Performed by: SPECIALIST

## 2022-04-12 RX ORDER — APIXABAN 5 MG/1
5 TABLET, FILM COATED ORAL 2 TIMES DAILY
COMMUNITY
Start: 2022-01-17 | End: 2022-04-18 | Stop reason: SDUPTHER

## 2022-04-12 NOTE — PROGRESS NOTES
[facility]  Cardiology progress Note    Patient Name: Steve Bryson Jr.  : 1940    CHIEF COMPLAINT  Atrial fibrillation      Subjective   Subjective     HISTORY OF PRESENT ILLNESS    Steve Bryson Jr. is a 81 y.o. male with history of hypertension recently in the hospital with atrial fibrillation.  Has shortness of breath on exertion no chest pain.  Blood pressure controlled at home.    Review of Systems:   Constitutional no fever,  no weight loss   Skin no rash   Otolaryngeal no difficulty swallowing   Cardiovascular See HPI   Pulmonary no cough, no sputum production   Gastrointestinal no constipation, no diarrhea   Genitourinary no dysuria, no hematuria   Hematologic no easy bruisability, no abnormal bleeding   Musculoskeletal no muscle pain   Neurologic no dizziness, no falls         Personal History     Social History:  reports that he quit smoking about 41 years ago. His smoking use included cigarettes. He started smoking about 61 years ago. He has a 30.00 pack-year smoking history. He has never used smokeless tobacco. He reports previous alcohol use. He reports that he does not use drugs.    Home Medications:  Current Outpatient Medications on File Prior to Visit   Medication Sig   • alendronate (FOSAMAX) 70 MG tablet Take 1 tablet by mouth Every 7 (Seven) Days. (Patient taking differently: Take 70 mg by mouth Every 7 (Seven) Days. )   • aspirin (aspirin) 81 MG EC tablet Take 1 tablet by mouth Daily.   • atorvastatin (LIPITOR) 10 MG tablet Take 10 mg by mouth Daily.   • cetirizine (zyrTEC) 10 MG tablet Take 1 tablet by mouth Daily.   • cholecalciferol (VITAMIN D3) 25 MCG (1000 UT) tablet    • Cholecalciferol 25 MCG (1000 UT) capsule Take 1 capsule by mouth Daily.   • cyanocobalamin (VITAMIN B-12) 1000 MCG tablet Vitamin B-12  1,000 mcg tablet   • Eliquis 5 MG tablet tablet Take 5 mg by mouth 2 (Two) Times a Day.   • finasteride (PROSCAR) 5 MG tablet Take 1 tablet by  mouth Daily.   • fluticasone (FLONASE) 50 MCG/ACT nasal spray 2 sprays into the nostril(s) as directed by provider Daily. (Patient taking differently: 2 sprays into the nostril(s) as directed by provider 2 (Two) Times a Day As Needed.)   • gabapentin (NEURONTIN) 600 MG tablet Take 1 tablet by mouth 3 (Three) Times a Day.   • hydroCHLOROthiazide (HYDRODIURIL) 12.5 MG tablet Take 12.5 mg by mouth Daily.   • lisinopril (PRINIVIL,ZESTRIL) 20 MG tablet Take 1 tablet by mouth Daily.   • metFORMIN (GLUCOPHAGE) 1000 MG tablet Take 1 tablet by mouth 2 (Two) Times a Day With Meals. Take 1 PO BID   • tamsulosin (FLOMAX) 0.4 MG capsule 24 hr capsule Take 1 capsule by mouth Every Night.   • Testosterone 10 MG/ACT (2%) gel Place 40 mg on the skin as directed by provider Daily.   • metoprolol succinate XL (TOPROL-XL) 25 MG 24 hr tablet Take 1 tablet by mouth Every 12 (Twelve) Hours for 30 days.     No current facility-administered medications on file prior to visit.     Allergies:  No Known Allergies    Objective    Objective       Vitals:   Heart Rate:  [70-72] 72  BP: (146)/(66-70) 146/66  Body mass index is 37.12 kg/m².     Physical Exam:   Constitutional: Awake, alert, No acute distress    Eyes: PERRLA, sclerae anicteric, no conjunctival injection   HENT: NCAT, mucous membranes moist   Neck: Supple, no thyromegaly, no lymphadenopathy, trachea midline   Respiratory: Clear to auscultation bilaterally, nonlabored respirations    Cardiovascular: RRR, no murmurs or rubs. Palpable pedal pulses bilaterally   Musculoskeletal: No bilateral ankle edema, no cyanosis to extremities   Psychiatric: Appropriate affect, cooperative   Neurologic: Oriented x 3, strength symmetric in all extremities, Cranial Nerves grossly intact to confrontation, speech clear   Skin: No rashes.    Result Review    Result Review:  I have personally reviewed the available results from  [x]  Laboratory  [x]  EKG  [x]  Cardiology  [x]  Medications  [x]  Old  records  []  Other:   Procedures  Lab Results   Component Value Date    CHOL 108 08/24/2021     Lab Results   Component Value Date    TRIG 130 08/24/2021     Lab Results   Component Value Date    HDL 26 (L) 08/24/2021     Lab Results   Component Value Date    LDL 59 08/24/2021     Lab Results   Component Value Date    VLDL 23 08/24/2021     Results for orders placed during the hospital encounter of 07/02/21    Adult Transthoracic Echo Complete W/ Cont if Necessary Per Protocol    Interpretation Summary  1.  Normal left ventricular systolic function.  2.  Fibrocalcific mitral and aortic valves.  3.  Mild tricuspid regurgitation and trace mitral regurgitation.  4.  Trace aortic regurgitation.  5.  Biatrial enlargement noted.     Impression/Plan:  1.  Paroxysmal atrial fibrillation: Continue Eliquis 5 mg twice daily.  Able to tolerate Eliquis without any side effects.  Continue Toprol-XL 25 mg twice daily for rate control.  Sestamibi stress test to evaluate for any ischemia in view of his shortness of breath and atrial fibrillation.  2.  Essential hypertension controlled: Continue lisinopril 20 mg a day.  Continue Toprol-XL 25 mg twice daily.  3.  Shortness of breath stable: Recent echocardiogram shows normal left ventricular systolic function.  4.  Hyperlipidemia: Continue Lipitor 10 mg a day.      Brian Muniz MD   04/12/22   09:58 EDT

## 2022-04-13 RX ORDER — GABAPENTIN 600 MG/1
TABLET ORAL
Qty: 90 TABLET | Refills: 0 | Status: SHIPPED | OUTPATIENT
Start: 2022-04-13 | End: 2022-04-18 | Stop reason: SDUPTHER

## 2022-04-18 ENCOUNTER — OFFICE VISIT (OUTPATIENT)
Dept: FAMILY MEDICINE CLINIC | Facility: CLINIC | Age: 82
End: 2022-04-18

## 2022-04-18 VITALS
SYSTOLIC BLOOD PRESSURE: 120 MMHG | WEIGHT: 224.8 LBS | DIASTOLIC BLOOD PRESSURE: 76 MMHG | HEIGHT: 66 IN | OXYGEN SATURATION: 98 % | TEMPERATURE: 97.9 F | BODY MASS INDEX: 36.13 KG/M2 | HEART RATE: 90 BPM

## 2022-04-18 DIAGNOSIS — E29.1 HYPOGONADISM IN MALE: ICD-10-CM

## 2022-04-18 DIAGNOSIS — I48.91 ATRIAL FIBRILLATION, UNSPECIFIED TYPE: ICD-10-CM

## 2022-04-18 DIAGNOSIS — I10 ESSENTIAL HYPERTENSION: ICD-10-CM

## 2022-04-18 DIAGNOSIS — G89.29 CHRONIC BILATERAL LOW BACK PAIN WITHOUT SCIATICA: ICD-10-CM

## 2022-04-18 DIAGNOSIS — E55.9 VITAMIN D DEFICIENCY: ICD-10-CM

## 2022-04-18 DIAGNOSIS — J30.9 ALLERGIC RHINITIS, UNSPECIFIED SEASONALITY, UNSPECIFIED TRIGGER: ICD-10-CM

## 2022-04-18 DIAGNOSIS — M54.50 CHRONIC BILATERAL LOW BACK PAIN WITHOUT SCIATICA: ICD-10-CM

## 2022-04-18 DIAGNOSIS — R73.03 PREDIABETES: ICD-10-CM

## 2022-04-18 DIAGNOSIS — E78.00 PURE HYPERCHOLESTEROLEMIA: ICD-10-CM

## 2022-04-18 DIAGNOSIS — M54.6 CHRONIC BILATERAL THORACIC BACK PAIN: ICD-10-CM

## 2022-04-18 DIAGNOSIS — E89.0 HISTORY OF PARTIAL THYROIDECTOMY: ICD-10-CM

## 2022-04-18 DIAGNOSIS — G89.29 CHRONIC BILATERAL THORACIC BACK PAIN: ICD-10-CM

## 2022-04-18 DIAGNOSIS — R19.01 RIGHT UPPER QUADRANT ABDOMINAL MASS: Primary | ICD-10-CM

## 2022-04-18 DIAGNOSIS — Z51.81 MEDICATION MONITORING ENCOUNTER: ICD-10-CM

## 2022-04-18 LAB
ALBUMIN SERPL-MCNC: 4.4 G/DL (ref 3.5–5.2)
ALBUMIN/GLOB SERPL: 1.7 G/DL
ALP SERPL-CCNC: 73 U/L (ref 39–117)
ALT SERPL W P-5'-P-CCNC: 28 U/L (ref 1–41)
ANION GAP SERPL CALCULATED.3IONS-SCNC: 11.9 MMOL/L (ref 5–15)
AST SERPL-CCNC: 25 U/L (ref 1–40)
BASOPHILS # BLD AUTO: 0.07 10*3/MM3 (ref 0–0.2)
BASOPHILS NFR BLD AUTO: 1.1 % (ref 0–1.5)
BILIRUB SERPL-MCNC: 0.9 MG/DL (ref 0–1.2)
BUN SERPL-MCNC: 9 MG/DL (ref 8–23)
BUN/CREAT SERPL: 9.4 (ref 7–25)
CALCIUM SPEC-SCNC: 10.6 MG/DL (ref 8.6–10.5)
CHLORIDE SERPL-SCNC: 104 MMOL/L (ref 98–107)
CHOLEST SERPL-MCNC: 118 MG/DL (ref 0–200)
CO2 SERPL-SCNC: 26.1 MMOL/L (ref 22–29)
CREAT SERPL-MCNC: 0.96 MG/DL (ref 0.76–1.27)
DEPRECATED RDW RBC AUTO: 45.7 FL (ref 37–54)
EGFRCR SERPLBLD CKD-EPI 2021: 80.4 ML/MIN/1.73
EOSINOPHIL # BLD AUTO: 0.31 10*3/MM3 (ref 0–0.4)
EOSINOPHIL NFR BLD AUTO: 4.7 % (ref 0.3–6.2)
ERYTHROCYTE [DISTWIDTH] IN BLOOD BY AUTOMATED COUNT: 13 % (ref 12.3–15.4)
GLOBULIN UR ELPH-MCNC: 2.6 GM/DL
GLUCOSE SERPL-MCNC: 110 MG/DL (ref 65–99)
HCT VFR BLD AUTO: 45.5 % (ref 37.5–51)
HDLC SERPL-MCNC: 28 MG/DL (ref 40–60)
HGB BLD-MCNC: 15.8 G/DL (ref 13–17.7)
IMM GRANULOCYTES # BLD AUTO: 0.01 10*3/MM3 (ref 0–0.05)
IMM GRANULOCYTES NFR BLD AUTO: 0.2 % (ref 0–0.5)
LDLC SERPL CALC-MCNC: 57 MG/DL (ref 0–100)
LDLC/HDLC SERPL: 1.77 {RATIO}
LYMPHOCYTES # BLD AUTO: 1.54 10*3/MM3 (ref 0.7–3.1)
LYMPHOCYTES NFR BLD AUTO: 23.5 % (ref 19.6–45.3)
MCH RBC QN AUTO: 33.3 PG (ref 26.6–33)
MCHC RBC AUTO-ENTMCNC: 34.7 G/DL (ref 31.5–35.7)
MCV RBC AUTO: 96 FL (ref 79–97)
MONOCYTES # BLD AUTO: 0.59 10*3/MM3 (ref 0.1–0.9)
MONOCYTES NFR BLD AUTO: 9 % (ref 5–12)
NEUTROPHILS NFR BLD AUTO: 4.04 10*3/MM3 (ref 1.7–7)
NEUTROPHILS NFR BLD AUTO: 61.5 % (ref 42.7–76)
NRBC BLD AUTO-RTO: 0 /100 WBC (ref 0–0.2)
PLATELET # BLD AUTO: 206 10*3/MM3 (ref 140–450)
PMV BLD AUTO: 11.4 FL (ref 6–12)
POTASSIUM SERPL-SCNC: 4.3 MMOL/L (ref 3.5–5.2)
PROT SERPL-MCNC: 7 G/DL (ref 6–8.5)
RBC # BLD AUTO: 4.74 10*6/MM3 (ref 4.14–5.8)
SODIUM SERPL-SCNC: 142 MMOL/L (ref 136–145)
TRIGL SERPL-MCNC: 202 MG/DL (ref 0–150)
VLDLC SERPL-MCNC: 33 MG/DL (ref 5–40)
WBC NRBC COR # BLD: 6.56 10*3/MM3 (ref 3.4–10.8)

## 2022-04-18 PROCEDURE — 84439 ASSAY OF FREE THYROXINE: CPT | Performed by: FAMILY MEDICINE

## 2022-04-18 PROCEDURE — 80053 COMPREHEN METABOLIC PANEL: CPT | Performed by: FAMILY MEDICINE

## 2022-04-18 PROCEDURE — 99214 OFFICE O/P EST MOD 30 MIN: CPT | Performed by: FAMILY MEDICINE

## 2022-04-18 PROCEDURE — 80061 LIPID PANEL: CPT | Performed by: FAMILY MEDICINE

## 2022-04-18 PROCEDURE — 85025 COMPLETE CBC W/AUTO DIFF WBC: CPT | Performed by: FAMILY MEDICINE

## 2022-04-18 PROCEDURE — 84403 ASSAY OF TOTAL TESTOSTERONE: CPT | Performed by: FAMILY MEDICINE

## 2022-04-18 PROCEDURE — 84443 ASSAY THYROID STIM HORMONE: CPT | Performed by: FAMILY MEDICINE

## 2022-04-18 PROCEDURE — 83036 HEMOGLOBIN GLYCOSYLATED A1C: CPT | Performed by: FAMILY MEDICINE

## 2022-04-18 PROCEDURE — 36415 COLL VENOUS BLD VENIPUNCTURE: CPT | Performed by: FAMILY MEDICINE

## 2022-04-18 PROCEDURE — 82306 VITAMIN D 25 HYDROXY: CPT | Performed by: FAMILY MEDICINE

## 2022-04-18 RX ORDER — ALENDRONATE SODIUM 70 MG/1
70 TABLET ORAL
Qty: 13 TABLET | Refills: 3 | Status: SHIPPED | OUTPATIENT
Start: 2022-04-18 | End: 2023-04-06 | Stop reason: SDUPTHER

## 2022-04-18 RX ORDER — METOPROLOL SUCCINATE 25 MG/1
25 TABLET, EXTENDED RELEASE ORAL EVERY 12 HOURS SCHEDULED
Qty: 180 TABLET | Refills: 3 | Status: SHIPPED | OUTPATIENT
Start: 2022-04-18 | End: 2023-04-06

## 2022-04-18 RX ORDER — APIXABAN 5 MG/1
5 TABLET, FILM COATED ORAL 2 TIMES DAILY
Qty: 180 TABLET | Refills: 3 | Status: SHIPPED | OUTPATIENT
Start: 2022-04-18 | End: 2023-04-06 | Stop reason: SDUPTHER

## 2022-04-18 RX ORDER — CETIRIZINE HYDROCHLORIDE 10 MG/1
10 TABLET ORAL DAILY
Qty: 90 TABLET | Refills: 3 | Status: SHIPPED | OUTPATIENT
Start: 2022-04-18 | End: 2023-04-06 | Stop reason: SDUPTHER

## 2022-04-18 RX ORDER — GABAPENTIN 600 MG/1
600 TABLET ORAL 3 TIMES DAILY
Qty: 90 TABLET | Refills: 2 | Status: SHIPPED | OUTPATIENT
Start: 2022-04-18 | End: 2022-07-20 | Stop reason: SDUPTHER

## 2022-04-18 RX ORDER — TAMSULOSIN HYDROCHLORIDE 0.4 MG/1
1 CAPSULE ORAL NIGHTLY
Qty: 90 CAPSULE | Refills: 3 | Status: SHIPPED | OUTPATIENT
Start: 2022-04-18 | End: 2023-04-06 | Stop reason: SDUPTHER

## 2022-04-18 RX ORDER — TESTOSTERONE 10 MG/.5G
40 GEL, METERED TOPICAL DAILY
Qty: 60 G | Refills: 2 | Status: SHIPPED | OUTPATIENT
Start: 2022-04-18 | End: 2022-07-20 | Stop reason: SDUPTHER

## 2022-04-18 RX ORDER — LISINOPRIL 20 MG/1
20 TABLET ORAL DAILY
Qty: 90 TABLET | Refills: 3 | Status: SHIPPED | OUTPATIENT
Start: 2022-04-18 | End: 2023-04-06 | Stop reason: SDUPTHER

## 2022-04-18 RX ORDER — ASPIRIN 81 MG/1
81 TABLET ORAL DAILY
Qty: 90 TABLET | Refills: 3 | Status: SHIPPED | OUTPATIENT
Start: 2022-04-18 | End: 2023-04-06 | Stop reason: SDUPTHER

## 2022-04-18 RX ORDER — FLUTICASONE PROPIONATE 50 MCG
2 SPRAY, SUSPENSION (ML) NASAL DAILY
Qty: 48 G | Refills: 3 | Status: SHIPPED | OUTPATIENT
Start: 2022-04-18 | End: 2023-04-06 | Stop reason: SDUPTHER

## 2022-04-18 RX ORDER — ATORVASTATIN CALCIUM 10 MG/1
10 TABLET, FILM COATED ORAL DAILY
Qty: 90 TABLET | Refills: 3 | Status: SHIPPED | OUTPATIENT
Start: 2022-04-18 | End: 2023-04-06 | Stop reason: SDUPTHER

## 2022-04-18 RX ORDER — HYDROCHLOROTHIAZIDE 12.5 MG/1
12.5 TABLET ORAL DAILY
Qty: 90 TABLET | Refills: 3 | Status: SHIPPED | OUTPATIENT
Start: 2022-04-18 | End: 2023-04-06 | Stop reason: SDUPTHER

## 2022-04-18 RX ORDER — FINASTERIDE 5 MG/1
5 TABLET, FILM COATED ORAL DAILY
Qty: 90 TABLET | Refills: 3 | Status: SHIPPED | OUTPATIENT
Start: 2022-04-18 | End: 2023-04-06 | Stop reason: SDUPTHER

## 2022-04-18 RX ORDER — MELATONIN
1000 DAILY
Qty: 90 TABLET | Refills: 3 | Status: SHIPPED | OUTPATIENT
Start: 2022-04-18 | End: 2023-04-06 | Stop reason: SDUPTHER

## 2022-04-18 NOTE — PROGRESS NOTES
"Chief Complaint  Abdominal mass  Med refills  Low back pain  Thoracic back pain  Hypogonadism    Subjective          Steve Bryson Jr. presents to [facility]  History of Present Illness  Patient presents today to follow-up for the abdominal mass in the right upper quadrant area.  He has been out of town in [place] for the past 8 months.  He has returned now.  He had this evaluated with Dr. Brice prior to leaving.  His path report was read as scant benign fibroadipose tissue.  He does have a follow-up CT coming up next week.  I gave him the appointment date and time for this.  He will also follow-up with Dr. Brice on 5/10/2022.  He denies any abdominal pain.  He does need medications refilled.  He reports compliance with his treatment for hypertension.  He is currently taking hydrochlorothiazide 12.5 mg, lisinopril 20 mg, and metoprolol succinate 25 mg twice daily.  He takes Proscar and Flomax for enlarged prostate.  His last PSA level was back in May 2021 and it was normal at 1.92.  He is still taking Fortesta 40 mg daily.  He reports compliance with this.  He is taking atorvastatin 10 mg daily for hyperlipidemia.  He is requesting refill of gabapentin for treatment of thoracic and low back pain.  Symptoms have been under good control.  His Parag, UDS, and controlled substance agreement have been reviewed and are appropriate.  Objective   Vital Signs:   /76   Pulse 90   Temp 97.9 °F (36.6 °C)   Ht 167.6 cm (66\")   Wt 102 kg (224 lb 12.8 oz)   SpO2 98%   BMI 36.28 kg/m²            Physical Exam   General: AAO ×3, no acute distress, pleasant  HEENT: Normocephalic, atraumatic  Cardiovascular: Regular rate and rhythm without appreciable murmur  Respiratory: Clear to auscultation bilaterally no RRW  Gastrointestinal: Intra-abdominal mass palpated in the right upper quadrant area.  Soft nontender nondistended with bowel sounds present  extremities: No " edema  Neurologic: CN II through XII grossly intact   Psychiatric: Normal mood and affect  Result Review :                 Assessment and Plan    Diagnoses and all orders for this visit:    1. Right upper quadrant abdominal mass (Primary)    2. Prediabetes  -     Hemoglobin A1c    3. Essential hypertension  -     CBC & Differential  -     Comprehensive Metabolic Panel    4. Atrial fibrillation, unspecified type (HCC)    5. Pure hypercholesterolemia  -     Lipid Panel    6. Hypogonadism in male  -     Testosterone 10 MG/ACT (2%) gel; Place 40 mg on the skin as directed by provider Daily.  Dispense: 60 g; Refill: 2  -     Testosterone    7. Medication monitoring encounter  -     TSH+Free T4    8. Vitamin D deficiency  -     Vitamin D 25 Hydroxy    9. Allergic rhinitis, unspecified seasonality, unspecified trigger    10. Chronic bilateral thoracic back pain    11. Chronic bilateral low back pain without sciatica    12. History of partial thyroidectomy  -     TSH+Free T4    Other orders  -     alendronate (FOSAMAX) 70 MG tablet; Take 1 tablet by mouth Every 7 (Seven) Days. SUNDAY  Dispense: 13 tablet; Refill: 3  -     aspirin (aspirin) 81 MG EC tablet; Take 1 tablet by mouth Daily.  Dispense: 90 tablet; Refill: 3  -     atorvastatin (LIPITOR) 10 MG tablet; Take 1 tablet by mouth Daily.  Dispense: 90 tablet; Refill: 3  -     cetirizine (zyrTEC) 10 MG tablet; Take 1 tablet by mouth Daily.  Dispense: 90 tablet; Refill: 3  -     cholecalciferol (VITAMIN D3) 25 MCG (1000 UT) tablet; Take 1 tablet by mouth Daily.  Dispense: 90 tablet; Refill: 3  -     Eliquis 5 MG tablet tablet; Take 1 tablet by mouth 2 (Two) Times a Day.  Dispense: 180 tablet; Refill: 3  -     finasteride (PROSCAR) 5 MG tablet; Take 1 tablet by mouth Daily.  Dispense: 90 tablet; Refill: 3  -     fluticasone (FLONASE) 50 MCG/ACT nasal spray; 2 sprays into the nostril(s) as directed by provider Daily.  Dispense: 48 g; Refill: 3  -     hydroCHLOROthiazide  (HYDRODIURIL) 12.5 MG tablet; Take 1 tablet by mouth Daily.  Dispense: 90 tablet; Refill: 3  -     gabapentin (NEURONTIN) 600 MG tablet; Take 1 tablet by mouth 3 (Three) Times a Day.  Dispense: 90 tablet; Refill: 2  -     lisinopril (PRINIVIL,ZESTRIL) 20 MG tablet; Take 1 tablet by mouth Daily.  Dispense: 90 tablet; Refill: 3  -     metFORMIN (GLUCOPHAGE) 1000 MG tablet; Take 1 tablet by mouth 2 (Two) Times a Day With Meals. Take 1 PO BID  Dispense: 180 tablet; Refill: 3  -     metoprolol succinate XL (TOPROL-XL) 25 MG 24 hr tablet; Take 1 tablet by mouth Every 12 (Twelve) Hours for 30 days.  Dispense: 180 tablet; Refill: 3  -     tamsulosin (FLOMAX) 0.4 MG capsule 24 hr capsule; Take 1 capsule by mouth Every Night.  Dispense: 90 capsule; Refill: 3    I discussed for patient to keep appointment with general surgery coming up.  We discussed getting labs done today.  Further recommendations to follow based on results.  We discussed continue current medical management at this time.  Plan on seeing him back in 3 months or sooner if needed.     See note for indication of controlled substance.  Parag and drug screen have been reviewed.  Controlled substance agreement signed and scanned into chart.  After discussion of risk and benefits of medication, I have determined the patient is suitable for Rx while demonstrating the ability to safely follow and administer the medication plan.  Patient understands expectation that medication directions cannot be adjusted without a providers written approval.    Follow Up {Instructions Charge Capture  Follow-up Communications :23}  Return in about 3 months (around 7/18/2022) for hypogonadism.  Patient was given instructions and counseling regarding his condition or for health maintenance advice. Please see specific information pulled into the AVS if appropriate.

## 2022-04-19 LAB
25(OH)D3 SERPL-MCNC: 49.6 NG/ML (ref 30–100)
HBA1C MFR BLD: 5.7 % (ref 4.8–5.6)
T4 FREE SERPL-MCNC: 1.29 NG/DL (ref 0.93–1.7)
TESTOST SERPL-MCNC: 79.6 NG/DL (ref 193–740)
TSH SERPL DL<=0.05 MIU/L-ACNC: 2.12 UIU/ML (ref 0.27–4.2)

## 2022-04-20 ENCOUNTER — TELEPHONE (OUTPATIENT)
Dept: FAMILY MEDICINE CLINIC | Facility: CLINIC | Age: 82
End: 2022-04-20

## 2022-04-28 ENCOUNTER — HOSPITAL ENCOUNTER (OUTPATIENT)
Dept: CT IMAGING | Facility: HOSPITAL | Age: 82
Discharge: HOME | End: 2022-04-28
Admitting: SURGERY

## 2022-04-28 DIAGNOSIS — R19.00 ABDOMINAL MASS, UNSPECIFIED ABDOMINAL LOCATION: ICD-10-CM

## 2022-04-28 PROCEDURE — 74176 CT ABD & PELVIS W/O CONTRAST: CPT

## 2022-05-10 ENCOUNTER — OFFICE VISIT (OUTPATIENT)
Dept: SURGERY | Facility: CLINIC | Age: 82
End: 2022-05-10

## 2022-05-10 VITALS — HEIGHT: 66 IN | WEIGHT: 226.6 LBS | RESPIRATION RATE: 14 BRPM | BODY MASS INDEX: 36.42 KG/M2

## 2022-05-10 DIAGNOSIS — R19.00 ABDOMINAL MASS, UNSPECIFIED ABDOMINAL LOCATION: Primary | ICD-10-CM

## 2022-05-10 PROCEDURE — 99213 OFFICE O/P EST LOW 20 MIN: CPT | Performed by: SURGERY

## 2022-05-10 NOTE — PROGRESS NOTES
"Chief Complaint:  No chief complaint on file.    History of Present Illness   Patient is here for a 1 year follow-up after an intra-abdominal mass was found incidentally on a CT abdomen pelvis.  A copy and paste of the background information from my initial office visit on 8/6/2021 is available below.  A copy of the plan from that office visit is also very available below.  CT-guided biopsy was done and pathology showed only benign fibroadipose tissue and a copy of the office note from 8/27/2021 about that is also available below.  Patient continues to do very well.  He has no problems at all eating food.  He does not have any abdominal pain, constipation, or GI symptoms.  A follow-up CT scan was done on 4/28/22 (prior CT scan was done on 8/4/21) and it showed \"no significant interval change in appearance of the large, thinly capsulated predominantly fatty density mass in the right abdomen containing coarse central calcification.  Differential diagnosis again includes a large lipoma with central fat necrosis.  However, liposarcoma is also in the differential diagnosis.  Further evaluation by MRI with and without contrast may be of value to better evaluate for enhancement/vascularity.\"  I reviewed the CT scan images myself.  Patient does not want to have any surgery.    Copy and Paste of Note from 8/27/21 Office Visit  Patient was previously seen by me on 8/6/2021 for an intra-abdominal mass.  See that office note for details.  I decided together with the patient to perform a CT-guided biopsy.  The biopsy was done and showed benign fibroadipose tissue.  The chance for sampling area was discussed with the patient.  Although the mass has benign imaging characteristics there is a possibility for malignancy.  This was discussed with the patient.  The mass seems to be completely asymptomatic.  The patient says he is going to [place] for the winter and is not going to change his plans.  He leaves in a week or twp.  The " patient will return to [place] sometime in April 2022.  I will order a CT abdomen pelvis to be done sometime in May 2022.  If the CT scan does not show any concerning changes, and the patient still does not have any symptoms then we will likely proceed with watul observation.  Further decision making sometime next year after have follow-up CT scan result.    Copy and Paste of HPI from 8/6/21 Office Visit  Steve Bryson Jr. is a 81 y.o. male referred by No ref. provider found for an intra-abdominal mass found incidentally on a CT abdomen pelvis. CT abdomen pelvis was done because the patient has 2 lumbar compression fractures. Copy and paste of the radiology report will be available below. I reviewed the CT scan images myself. 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 Patient denies any abdominal pain. Does not have any abdominal symptoms. Patient last had a colonoscopy 8 to 10 years ago. Patient is leaving to go to [place] for the winter sometime in the first week or two in September.    Copy and Paste of Plan from 8/6/21 Office Visit  CT guided biopsy. I will call the patient on the telephone once I have the biopsy result.    Allergies: Patient has no known allergies.      Current Outpatient Medications:   •  alendronate (FOSAMAX) 70 MG tablet, Take 1 tablet by mouth Every 7 (Seven) Days. SUNDAY, Disp: 13 tablet, Rfl: 3  •  aspirin (aspirin) 81 MG EC tablet, Take 1 tablet by mouth Daily., Disp: 90 tablet, Rfl: 3  •  atorvastatin (LIPITOR) 10 MG tablet, Take 1 tablet by mouth Daily., Disp: 90 tablet, Rfl: 3  •  cetirizine (zyrTEC) 10 MG tablet, Take 1 tablet by mouth Daily., Disp: 90 tablet, Rfl: 3  •  cholecalciferol (VITAMIN D3) 25 MCG (1000 UT) tablet, Take 1 tablet by mouth Daily., Disp: 90 tablet, Rfl: 3  •  cyanocobalamin (VITAMIN B-12) 1000 MCG tablet, Vitamin B-12   1,000 mcg tablet, Disp: , Rfl:   •  Eliquis 5 MG tablet tablet, Take 1 tablet by mouth 2 (Two) Times a Day., Disp: 180 tablet, Rfl: 3  •  finasteride (PROSCAR) 5 MG tablet, Take 1 tablet by mouth Daily., Disp: 90 tablet, Rfl: 3  •  fluticasone (FLONASE) 50 MCG/ACT nasal spray, 2 sprays into the nostril(s) as directed by provider Daily., Disp: 48 g, Rfl: 3  •  gabapentin (NEURONTIN) 600 MG tablet, Take 1 tablet by mouth 3 (Three) Times a Day., Disp: 90 tablet, Rfl: 2  •  hydroCHLOROthiazide (HYDRODIURIL) 12.5 MG tablet, Take 1 tablet by mouth Daily., Disp: 90 tablet, Rfl: 3  •  lisinopril (PRINIVIL,ZESTRIL) 20 MG tablet, Take 1 tablet by mouth Daily., Disp: 90 tablet, Rfl: 3  •  metFORMIN (GLUCOPHAGE) 1000 MG tablet, Take 1 tablet by mouth 2 (Two) Times a Day With Meals. Take 1 PO BID, Disp: 180 tablet, Rfl: 3  •  metoprolol succinate XL (TOPROL-XL) 25 MG 24 hr tablet, Take 1 tablet by mouth Every 12 (Twelve) Hours for 30 days., Disp: 180 tablet, Rfl: 3  •  tamsulosin (FLOMAX) 0.4 MG capsule 24 hr capsule, Take 1 capsule by mouth Every Night., Disp: 90 capsule, Rfl: 3  •  Testosterone 10 MG/ACT (2%) gel, Place 40 mg on the skin as directed by provider Daily., Disp: 60 g, Rfl: 2    Past Medical History:   • Arrhythmia   • BPH with urinary obstruction   • Diabetes mellitus   • Epididymal mass   • Essential hypertension   • Follicular adenoma of thyroid gland    RIGHT   • Hypogonadism, testicular   • Mixed hyperlipidemia   • Paroxysmal atrial fibrillation   • Seizure (HCC)    once per wife butbwas diagnoised with afib        Past Surgical History:   • COLONOSCOPY    15 YEARS AGO    • MOLE REMOVAL    5/1/14-HAS NOT REC'D PATHOLOGY RESULTS YET   • THYROIDECTOMY, PARTIAL   • TOTAL KNEE ARTHROPLASTY       Family History:   Family History   Problem Relation Age of Onset   • Clotting disorder Mother    • Hypertension Mother    • Emphysema Father         Social History:  Social History     Tobacco Use   • Smoking status:  Former Smoker     Packs/day: 2.00     Years: 15.00     Pack years: 30.00     Types: Cigarettes     Start date:      Quit date:      Years since quittin.3   • Smokeless tobacco: Never Used   • Tobacco comment: 14-QUIT 25-30 YEARS AGO   Substance Use Topics   • Alcohol use: Not Currently       Objective   Vitals:    05/10/22 1025   Resp: 14     • Constitutional: wife present, alert, no acute distress  • HENT:  NCAT, no visible deformities or lesions  • Eyes:  sclerae clear, conjunctivae clear, EOMI  • Neck:  normal appearance, no masses, trachea midline  • Respiratory:  breathing not labored, respiratory effort appears normal  • Cardiovascular:  heart regular rate  • Abdomen:  soft, nontender, nondistended    • Skin and subcutaneous tissue:  no visible concerning rashes or lesions, no jaundice  • Musculoskeletal: moving all extremities symmetrically and purposefully  • Neurologic:  no obvious motor or sensory deficits, normal gait, able to stand without difficulty, cerebellar function without any obvious abnormalities, alert & oriented x 3, speech clear  • Psychiatric:  judgment and insight intact, mood normal, affect appropriate, cooperative    CT A/P 21   CONCLUSION: Large fat attenuation, thinly in capsulated mass in the right side of the abdomen.  There is fairly large area of central rim calcified fat, that may be related to fat necrosis or old internal hemorrhage.  This lesion could represent a large lipoma, however liposarcoma could have a similar appearance.     Other findings as above.      Assessment:  Intra-abdominal mass - no concerning change in radiographic appearance on interval CT scan, prior CT guided biopsy with benign pathology, no GI symptoms reported by patient, and patient with strong desire to avoid surgery unless absolutely necessary.    Plan:  Will evaluate with MRI.  If MRI result is more consistent with non cancerous process then will proceed with watchful observation  and have patient see me again in one year.  I will call patient on the telephone once I have the MRI result.    Yousif Brice MD  05/10/2022    Electronically signed by Yousif Brice MD, 5/10/22    Addendum, 6/14/22:  MRI completed and there are no findings concerning for malignancy.  Findings are more consistent with benign pathology than malignancy.  Findings were discussed with the patient.  Plan is to place the patient on a callback list to be seen in the office again in May 2023 at which time I will evaluate with an interval CT abdomen pelvis.    Electronically signed by Yousif Brice MD, 06/14/22, 6:25 PM EDT.

## 2022-05-31 ENCOUNTER — HOSPITAL ENCOUNTER (OUTPATIENT)
Dept: NUCLEAR MEDICINE | Facility: HOSPITAL | Age: 82
Discharge: HOME | End: 2022-05-31

## 2022-05-31 DIAGNOSIS — I25.10 ATHEROSCLEROSIS OF NATIVE CORONARY ARTERY OF NATIVE HEART WITHOUT ANGINA PECTORIS: ICD-10-CM

## 2022-05-31 DIAGNOSIS — I48.0 PAROXYSMAL ATRIAL FIBRILLATION: ICD-10-CM

## 2022-05-31 PROCEDURE — 78452 HT MUSCLE IMAGE SPECT MULT: CPT | Performed by: SPECIALIST

## 2022-05-31 PROCEDURE — 93018 CV STRESS TEST I&R ONLY: CPT | Performed by: SPECIALIST

## 2022-05-31 PROCEDURE — 78452 HT MUSCLE IMAGE SPECT MULT: CPT

## 2022-05-31 PROCEDURE — 93016 CV STRESS TEST SUPVJ ONLY: CPT | Performed by: NURSE PRACTITIONER

## 2022-05-31 PROCEDURE — 93017 CV STRESS TEST TRACING ONLY: CPT

## 2022-05-31 PROCEDURE — A9502 TC99M TETROFOSMIN: HCPCS | Performed by: SPECIALIST

## 2022-05-31 PROCEDURE — 0 TECHNETIUM TETROFOSMIN KIT: Performed by: SPECIALIST

## 2022-05-31 RX ADMIN — TETROFOSMIN 1 DOSE: 1.38 INJECTION, POWDER, LYOPHILIZED, FOR SOLUTION INTRAVENOUS at 09:15

## 2022-06-01 LAB
BH CV IMMEDIATE POST TECH DATA BLOOD PRESSURE: NORMAL MMHG
BH CV IMMEDIATE POST TECH DATA HEART RATE: 91 BPM
BH CV IMMEDIATE POST TECH DATA OXYGEN SATS: 98 %
BH CV REST NUCLEAR ISOTOPE DOSE: 34.9 MCI
BH CV SIX MINUTE RECOVERY TECH DATA BLOOD PRESSURE: NORMAL
BH CV SIX MINUTE RECOVERY TECH DATA HEART RATE: 97 BPM
BH CV SIX MINUTE RECOVERY TECH DATA OXYGEN SATURATION: 96 %
BH CV STRESS BP STAGE 1: NORMAL
BH CV STRESS COMMENTS STAGE 1: NORMAL
BH CV STRESS DOSE REGADENOSON STAGE 1: 0.4
BH CV STRESS DURATION MIN STAGE 1: 0
BH CV STRESS DURATION SEC STAGE 1: 10
BH CV STRESS HR STAGE 1: 86
BH CV STRESS NUCLEAR ISOTOPE DOSE: 36.8 MCI
BH CV STRESS O2 STAGE 1: 93
BH CV STRESS PROTOCOL 1: NORMAL
BH CV STRESS RECOVERY BP: NORMAL MMHG
BH CV STRESS RECOVERY HR: 87 BPM
BH CV STRESS RECOVERY O2: 96 %
BH CV STRESS STAGE 1: 1
BH CV THREE MINUTE POST TECH DATA BLOOD PRESSURE: NORMAL MMHG
BH CV THREE MINUTE POST TECH DATA HEART RATE: 88 BPM
BH CV THREE MINUTE POST TECH DATA OXYGEN SATURATION: 98 %
LV EF NUC BP: 59 %
MAXIMAL PREDICTED HEART RATE: 141 BPM
PERCENT MAX PREDICTED HR: 64.54 %
STRESS BASELINE BP: NORMAL MMHG
STRESS BASELINE HR: 65 BPM
STRESS O2 SAT REST: 94 %
STRESS PERCENT HR: 76 %
STRESS POST O2 SAT PEAK: 98 %
STRESS POST PEAK BP: NORMAL MMHG
STRESS POST PEAK HR: 91 BPM
STRESS TARGET HR: 120 BPM

## 2022-06-01 PROCEDURE — 0 TECHNETIUM TETROFOSMIN KIT: Performed by: SPECIALIST

## 2022-06-01 PROCEDURE — 25010000002 REGADENOSON 0.4 MG/5ML SOLUTION: Performed by: SPECIALIST

## 2022-06-01 PROCEDURE — A9502 TC99M TETROFOSMIN: HCPCS | Performed by: SPECIALIST

## 2022-06-01 RX ADMIN — REGADENOSON 0.4 MG: 0.08 INJECTION, SOLUTION INTRAVENOUS at 08:55

## 2022-06-01 RX ADMIN — TETROFOSMIN 1 DOSE: 1.38 INJECTION, POWDER, LYOPHILIZED, FOR SOLUTION INTRAVENOUS at 08:55

## 2022-06-02 ENCOUNTER — HOSPITAL ENCOUNTER (OUTPATIENT)
Dept: MRI IMAGING | Facility: HOSPITAL | Age: 82
Discharge: HOME | End: 2022-06-02

## 2022-06-02 ENCOUNTER — TELEPHONE (OUTPATIENT)
Dept: CARDIOLOGY | Facility: CLINIC | Age: 82
End: 2022-06-02

## 2022-06-02 DIAGNOSIS — R19.00 ABDOMINAL MASS, UNSPECIFIED ABDOMINAL LOCATION: ICD-10-CM

## 2022-06-02 LAB
CREAT BLDA-MCNC: 0.9 MG/DL
EGFRCR SERPLBLD CKD-EPI 2021: 86.9 ML/MIN/1.73

## 2022-06-02 PROCEDURE — A9577 INJ MULTIHANCE: HCPCS | Performed by: SURGERY

## 2022-06-02 PROCEDURE — 0 GADOBENATE DIMEGLUMINE 529 MG/ML SOLUTION: Performed by: SURGERY

## 2022-06-02 PROCEDURE — 74183 MRI ABD W/O CNTR FLWD CNTR: CPT

## 2022-06-02 PROCEDURE — 82565 ASSAY OF CREATININE: CPT

## 2022-06-02 PROCEDURE — 72197 MRI PELVIS W/O & W/DYE: CPT

## 2022-06-02 RX ADMIN — GADOBENATE DIMEGLUMINE 20 ML: 529 INJECTION, SOLUTION INTRAVENOUS at 09:56

## 2022-06-02 NOTE — TELEPHONE ENCOUNTER
----- Message from KENNETH Gold sent at 6/1/2022  4:52 PM EDT -----  Notify pt stress test negative for ischemia, continue with follow up 9/1/22, if shortness of breath persists or worsens, notify office so further testing can be done if needed

## 2022-07-20 DIAGNOSIS — E29.1 HYPOGONADISM IN MALE: ICD-10-CM

## 2022-07-20 RX ORDER — TESTOSTERONE 10 MG/.5G
40 GEL, METERED TOPICAL DAILY
Qty: 60 G | Refills: 2 | Status: SHIPPED | OUTPATIENT
Start: 2022-07-20 | End: 2022-11-22 | Stop reason: SDUPTHER

## 2022-07-20 RX ORDER — GABAPENTIN 600 MG/1
600 TABLET ORAL 3 TIMES DAILY
Qty: 90 TABLET | Refills: 2 | Status: SHIPPED | OUTPATIENT
Start: 2022-07-20 | End: 2022-11-21 | Stop reason: SDUPTHER

## 2022-07-27 ENCOUNTER — OFFICE VISIT (OUTPATIENT)
Dept: FAMILY MEDICINE CLINIC | Facility: CLINIC | Age: 82
End: 2022-07-27

## 2022-07-27 VITALS
SYSTOLIC BLOOD PRESSURE: 138 MMHG | WEIGHT: 224.2 LBS | BODY MASS INDEX: 36.03 KG/M2 | HEIGHT: 66 IN | TEMPERATURE: 98.5 F | DIASTOLIC BLOOD PRESSURE: 68 MMHG | OXYGEN SATURATION: 99 % | HEART RATE: 64 BPM

## 2022-07-27 DIAGNOSIS — M54.50 CHRONIC BILATERAL LOW BACK PAIN WITHOUT SCIATICA: Primary | ICD-10-CM

## 2022-07-27 DIAGNOSIS — G89.29 CHRONIC BILATERAL LOW BACK PAIN WITHOUT SCIATICA: Primary | ICD-10-CM

## 2022-07-27 DIAGNOSIS — R73.03 PREDIABETES: ICD-10-CM

## 2022-07-27 DIAGNOSIS — R19.01 RIGHT UPPER QUADRANT ABDOMINAL MASS: ICD-10-CM

## 2022-07-27 DIAGNOSIS — Z51.81 MEDICATION MONITORING ENCOUNTER: ICD-10-CM

## 2022-07-27 DIAGNOSIS — E29.1 HYPOGONADISM IN MALE: ICD-10-CM

## 2022-07-27 PROCEDURE — 80305 DRUG TEST PRSMV DIR OPT OBS: CPT | Performed by: FAMILY MEDICINE

## 2022-07-27 PROCEDURE — 99214 OFFICE O/P EST MOD 30 MIN: CPT | Performed by: FAMILY MEDICINE

## 2022-07-27 NOTE — PROGRESS NOTES
"Chief Complaint  Hypogonadism  Chronic low back pain  Subjective        Steve Bryson Jr. presents to [facility]  History of Present Illness  Patient presents today to follow-up for hypogonadism and chronic low back pain.  He is taking gabapentin for chronic low back pain which has kept his symptoms under control.  He is not requesting refill at this time.  He is taking Fortesta 40 mg once daily for hypogonadism.  His last testosterone level was low however previously it was within normal limits at 518.  I discussed with him monitoring at this time and having his labs repeated at his follow-up appointment when he returns from [place].  He is in [place] for 6 months at a time.  He does have a right upper quadrant mass that has been followed by Dr. Brice.  Per his most recent MRI of the abdomen this is suspected to be most compatible with a nonaggressive lipomatous lesion.  They are monitoring at this time.  No plans for surgery.  I reviewed his lab work.  Previously noted to be in the prediabetic range.  His thyroid levels are within normal range.  Objective   Vital Signs:  /68   Pulse 64   Temp 98.5 °F (36.9 °C)   Ht 167.6 cm (66\")   Wt 102 kg (224 lb 3.2 oz)   SpO2 99%   BMI 36.19 kg/m²   Estimated body mass index is 36.19 kg/m² as calculated from the following:    Height as of this encounter: 167.6 cm (66\").    Weight as of this encounter: 102 kg (224 lb 3.2 oz).          Physical Exam   General: AAO ×3, no acute distress, pleasant  HEENT: Normocephalic, atraumatic, no discharge in the eyes, no discharge from the nose, no oropharyngeal erythema or exudates, and TMs intact bilaterally with no erythema, no cervical tenderness or lymphadenopathy  Cardiovascular: Regular rate and rhythm without appreciable murmur  Respiratory: Clear to auscultation bilaterally no RRW  Gastrointestinal: Soft nontender nondistended with bowel sounds present  extremities: No " edema  Neurologic: CN II through XII grossly intact   Psychiatric: Normal mood and affect  Result Review :                Assessment and Plan   Diagnoses and all orders for this visit:    1. Chronic bilateral low back pain without sciatica (Primary)    2. Medication monitoring encounter  -     POC Urine Drug Screen [facility]    3. Right upper quadrant abdominal mass    4. Prediabetes    5. Hypogonadism in male    Patient is taking tramadol for chronic low back pain.  He is not requesting refill at this time.  Parag, UDS, and controlled substance agreement have been reviewed today and are appropriate.  We discussed continue current management of hypogonadism.  Plan to see patient back in April for his next appointment when he returns from [place].  Plan to have labs done at that time.         Follow Up   Return in about 9 months (around 4/27/2023) for hypogonadism.  Patient was given instructions and counseling regarding his condition or for health maintenance advice. Please see specific information pulled into the AVS if appropriate.

## 2022-08-23 ENCOUNTER — PATIENT ROUNDING (BHMG ONLY) (OUTPATIENT)
Dept: OTOLARYNGOLOGY | Facility: CLINIC | Age: 82
End: 2022-08-23

## 2022-08-23 ENCOUNTER — OFFICE VISIT (OUTPATIENT)
Dept: OTOLARYNGOLOGY | Facility: CLINIC | Age: 82
End: 2022-08-23

## 2022-08-23 ENCOUNTER — PROCEDURE VISIT (OUTPATIENT)
Dept: OTOLARYNGOLOGY | Facility: CLINIC | Age: 82
End: 2022-08-23

## 2022-08-23 VITALS — HEIGHT: 66 IN | BODY MASS INDEX: 36.35 KG/M2 | TEMPERATURE: 97.2 F | WEIGHT: 226.2 LBS

## 2022-08-23 DIAGNOSIS — H91.8X9 ASYMMETRICAL HEARING LOSS: ICD-10-CM

## 2022-08-23 DIAGNOSIS — H93.13 TINNITUS OF BOTH EARS: ICD-10-CM

## 2022-08-23 DIAGNOSIS — H90.3 SENSORY HEARING LOSS, BILATERAL: ICD-10-CM

## 2022-08-23 DIAGNOSIS — R48.1: ICD-10-CM

## 2022-08-23 DIAGNOSIS — H90.3 SENSORINEURAL HEARING LOSS (SNHL) OF BOTH EARS: Primary | ICD-10-CM

## 2022-08-23 PROCEDURE — 92567 TYMPANOMETRY: CPT | Performed by: AUDIOLOGIST

## 2022-08-23 PROCEDURE — 92557 COMPREHENSIVE HEARING TEST: CPT | Performed by: AUDIOLOGIST

## 2022-08-23 PROCEDURE — 99204 OFFICE O/P NEW MOD 45 MIN: CPT | Performed by: NURSE PRACTITIONER

## 2022-08-23 NOTE — PROGRESS NOTES
August 23, 2022    Hello, may I speak with Steve Bryson Jr.?    My name is Debo      I am  with [facility]  [address]  [place] [address] [phone].    Before we get started may I verify your date of birth? 1940    I am calling to officially welcome you to our practice and ask about your recent visit. Is this a good time to talk? yes    Tell me about your visit with us. What things went well?  went great       We're always looking for ways to make our patients' experiences even better. Do you have recommendations on ways we may improve?  no    Overall were you satisfied with your first visit to our practice? yes       I appreciate you taking the time to speak with me today. Is there anything else I can do for you? no      Thank you, and have a great day.

## 2022-08-23 NOTE — PROGRESS NOTES
Patient Name: Steve Bryson Jr.   Visit Date: 08/23/2022   Patient ID: [id number]  Provider: KENNETH Puckett    Sex: male  Location: [facility]   YOB: 1940  Location Address: [address] [place] [address], [address],?[place]?[address]    Primary Care Provider Ok Moore DO  Location Phone: [phone]    Referring Provider: Ok Moore DO        Chief Complaint  Hearing Loss (New Patient)    Subjective   Steve Bryson Jr. is a 81 y.o. male who presents to [facility] today as a consult from Ok Moore DO for evaluation of bilateral sensorineural hearing loss.  [facility] showed an asymmetry in his word recognition scores.  Patient states he had a longstanding history with hearing loss and has had at least 2 sets of hearing aids.  He states he just recently got a new set and is very pleased with these.  He does have significant tinnitus symptoms but states that he does not hear this whenever he is wearing his hearing aids.      Current Outpatient Medications on File Prior to Visit   Medication Sig   • alendronate (FOSAMAX) 70 MG tablet Take 1 tablet by mouth Every 7 (Seven) Days. SUNDAY   • aspirin (aspirin) 81 MG EC tablet Take 1 tablet by mouth Daily.   • atorvastatin (LIPITOR) 10 MG tablet Take 1 tablet by mouth Daily.   • Calcium Carb-Cholecalciferol 600-400 MG-UNIT tablet Every 12 (Twelve) Hours.   • cetirizine (zyrTEC) 10 MG tablet Take 1 tablet by mouth Daily.   • cholecalciferol (VITAMIN D3) 25 MCG (1000 UT) tablet Take 1 tablet by mouth Daily.   • cyanocobalamin (VITAMIN B-12) 1000 MCG tablet Vitamin B-12  1,000 mcg tablet   • Eliquis 5 MG tablet tablet Take 1 tablet by mouth 2 (Two) Times a Day.   • finasteride (PROSCAR) 5 MG tablet Take 1 tablet by mouth Daily.   • fluticasone (FLONASE) 50 MCG/ACT nasal spray 2 sprays into the nostril(s) as directed by provider  "Daily.   • gabapentin (NEURONTIN) 600 MG tablet Take 1 tablet by mouth 3 (Three) Times a Day.   • hydroCHLOROthiazide (HYDRODIURIL) 12.5 MG tablet Take 1 tablet by mouth Daily.   • lisinopril (PRINIVIL,ZESTRIL) 20 MG tablet Take 1 tablet by mouth Daily.   • metFORMIN (GLUCOPHAGE) 1000 MG tablet Take 1 tablet by mouth 2 (Two) Times a Day With Meals. Take 1 PO BID   • tamsulosin (FLOMAX) 0.4 MG capsule 24 hr capsule Take 1 capsule by mouth Every Night.   • Testosterone 10 MG/ACT (2%) gel Place 40 mg on the skin as directed by provider Daily.   • metoprolol succinate XL (TOPROL-XL) 25 MG 24 hr tablet Take 1 tablet by mouth Every 12 (Twelve) Hours for 30 days.     No current facility-administered medications on file prior to visit.        Social History     Tobacco Use   • Smoking status: Former Smoker     Packs/day: 2.00     Years: 15.00     Pack years: 30.00     Types: Cigarettes     Start date:      Quit date:      Years since quittin.6   • Smokeless tobacco: Never Used   • Tobacco comment: 14-QUIT 25-30 YEARS AGO   Vaping Use   • Vaping Use: Never used   Substance Use Topics   • Alcohol use: Not Currently   • Drug use: Never       Objective     Vital Signs:   Temp 97.2 °F (36.2 °C) (Temporal)   Ht 167.6 cm (66\")   Wt 103 kg (226 lb 3.2 oz)   BMI 36.51 kg/m²       Physical Exam  Constitutional:       General: He is not in acute distress.     Appearance: Normal appearance. He is not ill-appearing.   HENT:      Head: Normocephalic and atraumatic.      Jaw: There is normal jaw occlusion. No tenderness or pain on movement.      Salivary Glands: Right salivary gland is not diffusely enlarged or tender. Left salivary gland is not diffusely enlarged or tender.      Right Ear: Tympanic membrane, ear canal and external ear normal.      Left Ear: Tympanic membrane, ear canal and external ear normal.      Nose: Nose normal. No septal deviation.      Right Sinus: No maxillary sinus tenderness or frontal sinus " tenderness.      Left Sinus: No maxillary sinus tenderness or frontal sinus tenderness.      Mouth/Throat:      Lips: Pink. No lesions.      Mouth: Mucous membranes are moist. No oral lesions.      Dentition: Normal dentition.      Tongue: No lesions.      Palate: No mass and lesions.      Pharynx: Oropharynx is clear. Uvula midline.      Tonsils: No tonsillar exudate.   Eyes:      Extraocular Movements: Extraocular movements intact.      Conjunctiva/sclera: Conjunctivae normal.      Pupils: Pupils are equal, round, and reactive to light.   Neck:      Thyroid: No thyroid mass, thyromegaly or thyroid tenderness.      Trachea: Trachea normal.   Pulmonary:      Effort: Pulmonary effort is normal. No respiratory distress.   Musculoskeletal:         General: Normal range of motion.      Cervical back: Normal range of motion and neck supple. No tenderness.   Lymphadenopathy:      Cervical: No cervical adenopathy.   Skin:     General: Skin is warm and dry.   Neurological:      General: No focal deficit present.      Mental Status: He is alert and oriented to person, place, and time.      Cranial Nerves: No cranial nerve deficit.      Motor: No weakness.      Gait: Gait normal.   Psychiatric:         Mood and Affect: Mood normal.         Behavior: Behavior normal.         Thought Content: Thought content normal.         Judgment: Judgment normal.               Result Review :              Assessment and Plan    Diagnoses and all orders for this visit:    1. Sensorineural hearing loss (SNHL) of both ears (Primary)  -     Audiometry With Tympanometry; Future  -     MRI Internal Auditory Canal With Contrast; Future    2. Sound recognition dysfunction  -     Audiometry With Tympanometry; Future  -     MRI Internal Auditory Canal With Contrast; Future    3. Asymmetrical hearing loss  -     Audiometry With Tympanometry; Future  -     MRI Internal Auditory Canal With Contrast; Future    Audiogram and tympanogram testing was  repeated in clinic today and shows the right ear with a moderate to profound sensorineural hearing loss.  The left ear has a mild to severe sensorineural hearing loss.  There is an asymmetry noted between 3000 Hz and 8000 Hz from 10 to 25 dB and asymmetry with the right ear being worse.  Speech reception threshold was at 60 dB bilaterally.  Word discrimination scores were 48% in the right ear at 95 dB and 72% in the left ear at 90 dB.  Tympanograms were normal bilaterally.  I have gone over the results of the audiogram with the patient and given him as copy.  We have discussed the asymmetry in both his hearing loss and speech recognition.  Because of this I would like to order an MRI of the internal auditory canals to rule out any sort of retrocochlear pathology.  Patient is traveling to [place] soon and would like to have this done in the spring 2023.       Follow Up   No follow-ups on file.  Patient was given instructions and counseling regarding his condition or for health maintenance advice. Please see specific information pulled into the AVS if appropriate.      KENNETH Puckett

## 2022-09-01 ENCOUNTER — OFFICE VISIT (OUTPATIENT)
Dept: CARDIOLOGY | Facility: CLINIC | Age: 82
End: 2022-09-01

## 2022-09-01 VITALS
BODY MASS INDEX: 36.34 KG/M2 | HEIGHT: 66 IN | HEART RATE: 60 BPM | SYSTOLIC BLOOD PRESSURE: 132 MMHG | WEIGHT: 226.13 LBS | DIASTOLIC BLOOD PRESSURE: 74 MMHG

## 2022-09-01 DIAGNOSIS — E78.2 MIXED HYPERLIPIDEMIA: ICD-10-CM

## 2022-09-01 DIAGNOSIS — I48.0 PAROXYSMAL ATRIAL FIBRILLATION: Primary | ICD-10-CM

## 2022-09-01 DIAGNOSIS — I10 ESSENTIAL HYPERTENSION: ICD-10-CM

## 2022-09-01 PROBLEM — I34.0 MITRAL VALVE INSUFFICIENCY: Status: RESOLVED | Noted: 2021-07-12 | Resolved: 2022-09-01

## 2022-09-01 PROBLEM — I07.1 TRICUSPID VALVE INSUFFICIENCY: Status: RESOLVED | Noted: 2021-07-12 | Resolved: 2022-09-01

## 2022-09-01 PROBLEM — N40.0 BENIGN PROSTATIC HYPERPLASIA: Status: RESOLVED | Noted: 2020-10-05 | Resolved: 2022-09-01

## 2022-09-01 PROCEDURE — 93000 ELECTROCARDIOGRAM COMPLETE: CPT | Performed by: NURSE PRACTITIONER

## 2022-09-01 PROCEDURE — 99214 OFFICE O/P EST MOD 30 MIN: CPT | Performed by: NURSE PRACTITIONER

## 2022-09-01 NOTE — PROGRESS NOTES
Chief Complaint  Atrial Fibrillation, Hypertension, and Hyperlipidemia    Subjective            History of Present Illness  Steve Bryson Jr. is a 81-year-old white/ male patient who presents to the office today for follow-up.  He has paroxysmal atrial fibrillation, hypertension, and hyperlipidemia.  He reports compliance with all of his medications.  He denies any chest pain, shortness of breath, lightheadedness/dizziness, palpitations, or edema.    PMH  Past Medical History:   Diagnosis Date   • Arrhythmia    • BPH with urinary obstruction 2014   • Diabetes mellitus    • Epididymal mass    • Essential hypertension 2021   • Follicular adenoma of thyroid gland 2021    RIGHT   • Hypogonadism, testicular    • Mixed hyperlipidemia 2021   • Paroxysmal atrial fibrillation 7/3/2021   • Seizure (HCC)     once per wife butbwas diagnoised with afib         ALLERGY  No Known Allergies       SURGICALHX  Past Surgical History:   Procedure Laterality Date   • COLONOSCOPY      15 YEARS AGO    • MOLE REMOVAL  2014-HAS NOT REC'D PATHOLOGY RESULTS YET   • THYROIDECTOMY, PARTIAL     • TOTAL KNEE ARTHROPLASTY Right           SOC  Social History     Socioeconomic History   • Marital status:    Tobacco Use   • Smoking status: Former Smoker     Packs/day: 2.00     Years: 15.00     Pack years: 30.00     Types: Cigarettes     Start date:      Quit date: 1981     Years since quittin.6   • Smokeless tobacco: Never Used   • Tobacco comment: 14-QUIT 25-30 YEARS AGO   Vaping Use   • Vaping Use: Never used   Substance and Sexual Activity   • Alcohol use: Not Currently   • Drug use: Never   • Sexual activity: Defer         FAMHX  Family History   Problem Relation Age of Onset   • Clotting disorder Mother    • Hypertension Mother    • Emphysema Father           MEDSIGONLY  Current Outpatient Medications on File Prior to Visit   Medication Sig   • alendronate (FOSAMAX) 70 MG  "tablet Take 1 tablet by mouth Every 7 (Seven) Days. SUNDAY   • aspirin (aspirin) 81 MG EC tablet Take 1 tablet by mouth Daily.   • atorvastatin (LIPITOR) 10 MG tablet Take 1 tablet by mouth Daily.   • Calcium Carb-Cholecalciferol 600-400 MG-UNIT tablet Every 12 (Twelve) Hours.   • cetirizine (zyrTEC) 10 MG tablet Take 1 tablet by mouth Daily.   • cholecalciferol (VITAMIN D3) 25 MCG (1000 UT) tablet Take 1 tablet by mouth Daily.   • cyanocobalamin (VITAMIN B-12) 1000 MCG tablet Vitamin B-12  1,000 mcg tablet   • Eliquis 5 MG tablet tablet Take 1 tablet by mouth 2 (Two) Times a Day.   • finasteride (PROSCAR) 5 MG tablet Take 1 tablet by mouth Daily.   • fluticasone (FLONASE) 50 MCG/ACT nasal spray 2 sprays into the nostril(s) as directed by provider Daily.   • gabapentin (NEURONTIN) 600 MG tablet Take 1 tablet by mouth 3 (Three) Times a Day.   • hydroCHLOROthiazide (HYDRODIURIL) 12.5 MG tablet Take 1 tablet by mouth Daily.   • lisinopril (PRINIVIL,ZESTRIL) 20 MG tablet Take 1 tablet by mouth Daily.   • metFORMIN (GLUCOPHAGE) 1000 MG tablet Take 1 tablet by mouth 2 (Two) Times a Day With Meals. Take 1 PO BID   • metoprolol succinate XL (TOPROL-XL) 25 MG 24 hr tablet Take 1 tablet by mouth Every 12 (Twelve) Hours for 30 days.   • tamsulosin (FLOMAX) 0.4 MG capsule 24 hr capsule Take 1 capsule by mouth Every Night.   • Testosterone 10 MG/ACT (2%) gel Place 40 mg on the skin as directed by provider Daily.     No current facility-administered medications on file prior to visit.         Objective   /74   Pulse 60   Ht 167.6 cm (66\")   Wt 103 kg (226 lb 2 oz)   BMI 36.50 kg/m²       Physical Exam  Constitutional:       Appearance: He is obese.   HENT:      Head: Normocephalic.   Neck:      Vascular: No carotid bruit.   Cardiovascular:      Rate and Rhythm: Normal rate. Rhythm irregular.      Pulses: Normal pulses.      Heart sounds: Normal heart sounds. No murmur heard.  Pulmonary:      Effort: Pulmonary effort is " normal.      Breath sounds: Normal breath sounds.   Musculoskeletal:      Cervical back: Neck supple.      Comments: Trace edema bilateral lower extremity   Skin:     General: Skin is dry.      Capillary Refill: Capillary refill takes less than 2 seconds.   Neurological:      Mental Status: He is alert and oriented to person, place, and time.   Psychiatric:         Behavior: Behavior normal.       ECG 12 Lead    Date/Time: 9/1/2022 9:44 AM  Performed by: Naomie Eckert APRN  Authorized by: Naomie Eckert APRN   Comparison: compared with previous ECG   Similar to previous ECG  Rhythm: atrial fibrillation  Rate: normal  BPM: 62  Conduction: conduction normal  ST Segments: ST segments normal  T Waves: T waves normal  QRS axis: normal  Other: no other findings    Clinical impression: abnormal EKG          Result Review :   The following data was reviewed by: KENNETH Santiago on 09/01/2022:  No results found for: PROBNP  CMP    CMP 4/18/22 6/2/22   Glucose 110 (A)    BUN 9    Creatinine 0.96 0.90   Sodium 142    Potassium 4.3    Chloride 104    Calcium 10.6 (A)    Albumin 4.40    Total Bilirubin 0.9    Alkaline Phosphatase 73    AST (SGOT) 25    ALT (SGPT) 28    (A) Abnormal value       Comments are available for some flowsheets but are not being displayed.           CBC w/diff    CBC w/Diff 4/18/22   WBC 6.56   RBC 4.74   Hemoglobin 15.8   Hematocrit 45.5   MCV 96.0   MCH 33.3 (A)   MCHC 34.7   RDW 13.0   Platelets 206   Neutrophil Rel % 61.5   Immature Granulocyte Rel % 0.2   Lymphocyte Rel % 23.5   Monocyte Rel % 9.0   Eosinophil Rel % 4.7   Basophil Rel % 1.1   (A) Abnormal value             Lab Results   Component Value Date    TSH 2.120 04/18/2022      Lab Results   Component Value Date    FREET4 1.29 04/18/2022      No results found for: DDIMERQUANT  Magnesium   Date Value Ref Range Status   07/03/2021 2.0 1.6 - 2.4 mg/dL Final      No results found for: DIGOXIN   Lab Results   Component Value  Date    TROPONINT <0.010 07/03/2021           Lipid Panel    Lipid Panel 4/18/22   Total Cholesterol 118   Triglycerides 202 (A)   HDL Cholesterol 28 (A)   VLDL Cholesterol 33   LDL Cholesterol  57   LDL/HDL Ratio 1.77   (A) Abnormal value            Results for orders placed during the hospital encounter of 07/02/21    Adult Transthoracic Echo Complete W/ Cont if Necessary Per Protocol    Interpretation Summary  1.  Normal left ventricular systolic function.  2.  Fibrocalcific mitral and aortic valves.  3.  Mild tricuspid regurgitation and trace mitral regurgitation.  4.  Trace aortic regurgitation.  5.  Biatrial enlargement noted.       Assessment and Plan    Diagnoses and all orders for this visit:    1. Paroxysmal atrial fibrillation (Primary)  Symptomatically stable at this time, continue metoprolol 25 mg daily.  Continue Eliquis for CVA prevention.    2. Essential hypertension  Currently controlled and without adverse effect from medication, continue lisinopril 20 mg daily and hydrochlorothiazide 12.5 mg daily.  Low-sodium diet discussed.    3. Mixed hyperlipidemia  Last lipid panel was 4/18/2022 with LDL of 57 which is within his goal range, continue atorvastatin 10 mg nightly.          Follow Up   Return in about 9 months (around 6/1/2023) for Follow up with Dr Muniz.    Patient was given instructions and counseling regarding his condition or for health maintenance advice. Please see specific information pulled into the AVS if appropriate.     Steve Bryson Jr.  reports that he quit smoking about 41 years ago. His smoking use included cigarettes. He started smoking about 61 years ago. He has a 30.00 pack-year smoking history. He has never used smokeless tobacco.         Naomie Eckert, KENNETH  09/01/22  09:56 EDT    Dictated Utilizing Dragon Dictation

## 2022-11-21 RX ORDER — GABAPENTIN 600 MG/1
600 TABLET ORAL 3 TIMES DAILY
Qty: 90 TABLET | Refills: 2 | Status: SHIPPED | OUTPATIENT
Start: 2022-11-21 | End: 2023-02-20 | Stop reason: SDUPTHER

## 2022-11-21 NOTE — TELEPHONE ENCOUNTER
Caller: JHON RAMIREZ    Relationship: Emergency Contact    Best call back number: [phone]    Requested Prescriptions:   Requested Prescriptions     Pending Prescriptions Disp Refills   • gabapentin (NEURONTIN) 600 MG tablet 90 tablet 2     Sig: Take 1 tablet by mouth 3 (Three) Times a Day.   AS WELLL AS  FORTESTA TESTOSTERONE GEL PUMP 10MG/.5 (60G)     Pharmacy where request should be sent:    [facility]  [address] [place] [address]  [id number]  PHOE:[phone]  FAX: [phone]    Does the patient have less than a 3 day supply:  [] Yes  [x] No    Jaun Morales Rep   11/21/22 08:21 EST

## 2022-11-22 ENCOUNTER — TELEPHONE (OUTPATIENT)
Dept: FAMILY MEDICINE CLINIC | Facility: CLINIC | Age: 82
End: 2022-11-22

## 2022-11-22 DIAGNOSIS — E29.1 HYPOGONADISM IN MALE: ICD-10-CM

## 2022-11-22 RX ORDER — TESTOSTERONE 10 MG/.5G
40 GEL, METERED TOPICAL DAILY
Qty: 60 G | Refills: 2 | Status: SHIPPED | OUTPATIENT
Start: 2022-11-22 | End: 2023-02-23 | Stop reason: SDUPTHER

## 2022-11-22 NOTE — TELEPHONE ENCOUNTER
Patient's wife stated that the pharmacy in [place] had not received his prescription for Testosterone Gel.  Asking if we could resend please.  [facility], [address] [place] [address]  Phone number [phone]   Fax number is [phone]

## 2023-01-23 ENCOUNTER — TELEPHONE (OUTPATIENT)
Dept: FAMILY MEDICINE CLINIC | Facility: CLINIC | Age: 83
End: 2023-01-23

## 2023-01-23 NOTE — TELEPHONE ENCOUNTER
Iris from [facility] in [place] called regarding patients medication     Testosterone 10 MG/ACT (2%) gel    Wants to change the 2% to 1.62% because the 2% is on a back order. The 1.62% is in the store.[phone]    Please Advise

## 2023-02-07 ENCOUNTER — TELEPHONE (OUTPATIENT)
Dept: FAMILY MEDICINE CLINIC | Facility: CLINIC | Age: 83
End: 2023-02-07
Payer: MEDICARE

## 2023-02-07 NOTE — TELEPHONE ENCOUNTER
HUB TO READ  LFT MSG FOR PATIENT TO CONTACT OFFICE TO RESCHEDULE 4/10/2023 APPOINTMENT WITH PROVIDER. HE WILL BE OUT OF OFFICE THIS WEEK.

## 2023-02-20 RX ORDER — GABAPENTIN 600 MG/1
600 TABLET ORAL 3 TIMES DAILY
Qty: 90 TABLET | Refills: 2 | Status: SHIPPED | OUTPATIENT
Start: 2023-02-20 | End: 2023-03-17 | Stop reason: SDUPTHER

## 2023-02-20 NOTE — TELEPHONE ENCOUNTER
Caller: ASHLEYJHON    Relationship: Emergency Contact    Best call back number: [phone]     Requested Prescriptions:   Requested Prescriptions     Pending Prescriptions Disp Refills   • gabapentin (NEURONTIN) 600 MG tablet 90 tablet 2     Sig: Take 1 tablet by mouth 3 (Three) Times a Day.        Pharmacy where request should be sent: [facility] #[id number] [address] AT [facility] [phone] [facility] [phone] FX       Does the patient have less than a 3 day supply:  [x] Yes  [] No    Would you like a call back once the refill request has been completed: [x] Yes [] No    If the office needs to give you a call back, can they leave a voicemail: [x] Yes [] No    Jaun Plascencia Rep   02/20/23 08:58 EST

## 2023-02-21 NOTE — TELEPHONE ENCOUNTER
Attempted to contact patient. Unable to leave message as call was answered and then hung up   Patient : Deneen Sheppard Age: 47 year old Sex: female   MRN: [id number] Encounter Date: 2/7/2020      History     Chief Complaint   Patient presents with   • Shortness of Breath     HPI  Deneen Sheppard is a 47 year old female with PMH of migranes who presents today with SOB.  Patient states she had a cold 1.5 weeks ago -- all her symptoms from this had resolved.  Then 3 days ago patient started having shortness of breath.  Patient states she will have shortness of breath at rest, and the SOB will worsen with exertion.  Patient states she also has chest “heaviness” located centrally; no pain, been going on since yesterday.  Patient denies any coughing, hemoptysis, known active cancers, hormone use, recent surgery/immobilization/travel, history of blood clots, calf pain or swelling.  Patient states she was diagnosed with AFib in the past, is not currently taking any medications for this and states \"nothing was ever done about it\"; denies any history of ablation.  Patient denies any fevers or chills.  Patient denies trying any medications for her shortness of breath.  Patient states she did quit smoking 1.5 months ago.  Patient denies history of asthma or COPD.  Patient is here to make sure nothing serious is going on.    History was provided by patient.    No Known Allergies    Discharge Medication List as of 2/7/2020  4:09 PM      Prior to Admission Medications    Details   naproxen (NAPROSYN) 500 MG tablet TAKE 1 TABLET BY MOUTH 2 TIMES DAILY AS NEEDED FOR PAIN (TAKE WITH MEALS, USE TYELNOL FIRST).Eprescribe, Disp-180 tablet, R-1      sumatriptan (IMITREX) 50 MG tablet Take 1 tablet by mouth at onset of migraine. May repeat after 2 hours if needed.Normal, Disp-10 tablet, R-0      ketorolac (TORADOL) 10 MG tablet Take 1 tablet by mouth every 6 hours as needed for Pain.Normal, Disp-12 tablet, R-0      ondansetron (ZOFRAN ODT) 4 MG disintegrating tablet Place 1 tablet onto the tongue every 6 hours.Normal, Disp-12  tablet, R-0      meclizine HCl (ANTIVERT) 25 MG tablet Take 1-2 tablets by mouth 3 times daily as needed for Dizziness.Normal, Disp-15 tablet, R-0      acetaminophen (TYLENOL) 650 MG CR tablet Take 1 tablet by mouth every 8 hours as needed for Pain.Eprescribe, Disp-120 tablet, R-1      SUMAtriptan (IMITREX) 20 MG/ACT nasal spray Spray 1 spray in alternating nostrils as needed for Migraine.Eprescribe, Disp-1 each, R-6             Discharge Medication List as of 2/7/2020  4:09 PM      New Prescriptions    Details   albuterol 108 (90 Base) MCG/ACT inhaler Inhale 2 puffs into the lungs every 4 hours as needed for Wheezing.Eprescribe, Disp-8.5 g, R-0      predniSONE (DELTASONE) 50 MG tablet Take 1 tablet by mouth daily for 5 days.Eprescribe, Disp-5 tablet, R-0             Past Medical History:   Diagnosis Date   • Decorative tattoo     right anterior hip   • Injury of back of thorax 2004    S/P fall.  \"Thoracic vertebral fracture\" per patient.       Past Surgical History:   Procedure Laterality Date   • BREAST LUMPECTOMY Left 1996    Status post left breast lump excision; benign   • TUBAL LIGATION         Family History   Problem Relation Age of Onset   • Cancer Other         Leukemia in paternal first cousin   • Cancer Other         Throat cancer in paternal first cousin   • Cancer Paternal Aunt         Pancreatic cancer       Social History     Tobacco Use   • Smoking status: Former Smoker     Packs/day: 0.00     Years: 22.00     Pack years: 0.00   • Smokeless tobacco: Never Used   Substance Use Topics   • Alcohol use: No   • Drug use: No       Review of Systems   Constitutional: Negative for chills and fever.   HENT: Negative.    Respiratory: Positive for shortness of breath. Negative for cough.    Cardiovascular: Negative for palpitations and leg swelling.   Gastrointestinal: Negative for abdominal pain, nausea and vomiting.   Skin: Negative for rash.   All other systems reviewed and are negative.      Physical Exam      ED Triage Vitals [02/07/20 1329]   ED Triage Vitals Group      Temp 97.7 °F (36.5 °C)      Heart Rate 78      Resp 16      /83      SpO2 100 %      EtCO2 mmHg       Height 5' 7\" (1.702 m)      Weight 177 lb (80.3 kg)      Weight Scale Used ED Actual      BMI (Calculated) 27.72      IBW/kg (Calculated) 61.6       Physical Exam   Constitutional: Vital signs are normal. She appears well-developed and well-nourished. She is cooperative.  Non-toxic appearance. No distress.   HENT:   Head: Atraumatic.   Right Ear: Hearing and external ear normal.   Left Ear: Hearing and external ear normal.   Nose: Nose normal.   Mouth/Throat: Uvula is midline, oropharynx is clear and moist and mucous membranes are normal.   Eyes: Conjunctivae and lids are normal.   Neck: Trachea normal, full passive range of motion without pain and phonation normal. Neck supple. No neck rigidity.   Cardiovascular: Normal rate, regular rhythm, S1 normal, S2 normal and normal heart sounds. Exam reveals no gallop.   No murmur heard.  Pulses:       Radial pulses are 2+ on the right side and 2+ on the left side.   Pulmonary/Chest: Effort normal. No respiratory distress. She has no decreased breath sounds. She has wheezes. She has no rhonchi. She has no rales.   No tripoding. Can speak in full sentences.   Abdominal: Soft. There is no tenderness. Musculoskeletal: Normal range of motion.     Neurological: She is alert. GCS eye subscore is 4. GCS verbal subscore is 5. GCS motor subscore is 6.   Skin: Skin is warm and intact. No rash noted. She is not diaphoretic.   Psychiatric: She has a normal mood and affect. Her speech is normal and behavior is normal.   Nursing note and vitals reviewed.      ED Course     Procedures    Lab Results     Results for orders placed or performed during the hospital encounter of 02/07/20   Comprehensive Metabolic Panel   Result Value Ref Range    Sodium 140 135 - 145 mmol/L    Potassium 3.7 3.4 - 5.1 mmol/L    Chloride  102 98 - 107 mmol/L    Carbon Dioxide 26 21 - 32 mmol/L    Anion Gap 16 10 - 20 mmol/L    Glucose 96 65 - 99 mg/dL    BUN 10 6 - 20 mg/dL    Creatinine 0.72 0.51 - 0.95 mg/dL    Glomerular Filtration Rate 101     BUN/ Creatinine Ratio 14 7 - 25    Bilirubin, Total 0.4 0.2 - 1.0 mg/dL    GOT/AST 19 <=37 Units/L    Alkaline Phosphatase 38 (L) 45 - 117 Units/L    Albumin 4.2 3.6 - 5.1 g/dL    Protein, Total 7.2 6.4 - 8.2 g/dL    Globulin 3.0 2.0 - 4.0 g/dL    A/G Ratio 1.4 1.0 - 2.4    GPT/ALT 34 <64 Units/L    Calcium 9.3 8.4 - 10.2 mg/dL   Troponin I Ultra Sensitive   Result Value Ref Range    Troponin I, Ultra Sensitive <0.02 <=0.04 ng/mL   Rapid Influenza A/B by PCR   Result Value Ref Range    Influenza A by PCR Not Detected Not Detected    Influenza B by PCR Not Detected Not Detected   D Dimer, Quantitative   Result Value Ref Range    D Dimer, Quantitative <0.19 <0.57 mg/L (FEU)   CBC with Automated Differential (performable only)   Result Value Ref Range    WBC 8.8 4.2 - 11.0 K/mcL    RBC 4.11 4.00 - 5.20 mil/mcL    HGB 13.1 12.0 - 15.5 g/dL    HCT 38.1 36.0 - 46.5 %    MCV 92.7 78.0 - 100.0 fl    MCH 31.9 26.0 - 34.0 pg    MCHC 34.4 32.0 - 36.5 g/dL    RDW-SD 43.7 39.0 - 50.0 fL    RDW-CV 12.7 11.0 - 15.0 %     140 - 450 K/mcL    NRBC 0 <=0 /100 WBC    Neutrophil, Percent 62 %    Lymphocytes, Percent 30 %    Mono, Percent 7 %    Eosinophils, Percent 0 %    Basophils, Percent 1 %    Immature Granulocytes 0 %    Absolute Neutrophils 5.4 1.8 - 7.7 K/mcL    Absolute Lymphocytes 2.6 1.0 - 4.8 K/mcL    Absolute Monocytes 0.6 0.3 - 0.9 K/mcL    Absolute Eosinophils  0.0 (L) 0.1 - 0.5 K/mcL    Absolute Basophils 0.0 0.0 - 0.3 K/mcL    Absolute Immmature Granulocytes 0.0 0.0 - 0.2 K/mcL   Gold Top   Result Value Ref Range    Extra Tube Hold for Add Ons    URINALYSIS & REFLEX MICRO WITH CULTURE IF INDICATED   Result Value Ref Range    COLOR, URINALYSIS Straw     APPEARANCE, URINALYSIS Clear     GLUCOSE, URINALYSIS  Negative Negative mg/dL    BILIRUBIN, URINALYSIS Negative Negative    KETONES, URINALYSIS Negative Negative mg/dL    SPECIFIC GRAVITY, URINALYSIS <1.005 (L) 1.005 - 1.030    OCCULT BLOOD, URINALYSIS Negative Negative    PH, URINALYSIS 8.0 (H) 5.0 - 7.0    PROTEIN, URINALYSIS Negative Negative mg/dL    UROBILINOGEN, URINALYSIS 0.2 0.2, 1.0 mg/dL    NITRITE, URINALYSIS Negative Negative    LEUKOCYTE ESTERASE, URINALYSIS Negative Negative       EKG Results     Encounter Date: 20   Electrocardiogram 12-Lead   Result Value    REPORT TEXT                              [facility]                                           Test Date:    2020               Test Time:    13:44:10  Pat Name:     SERGEI CASH          Department:     Patient ID:   [id number]                  Room:         [facility]  Gender:       Female                   Technician:   ASHLEY  :          1972               Requested By:   Order Number:                          Reading MD:   Zane Marie                                   Measurements  Intervals                              Axis            Rate:         81                       P:            71  WV:           136                      QRS:          19  QRSD:         78                       T:            42  QT:           392                                      QTc:          455                                                                 Interpretive Statements  Normal sinus rhythm  Low voltage QRS  Borderline EKG  Electronically Signed On 2020 13:44:22 CST by Fr mic Marie       EKG tracing interpreted by ED physician    Radiology Results     Imaging Results          XR Chest PA and Lateral (Final result)  Result time 20 14:36:32    Final result                 Impression:    IMPRESSION: No focal consolidations, no acute cardiopulmonary  disease.    Signed by: Teetee Gaytan               Narrative:    INTERPRETATION LOCATION: [facility]    PROCEDURE:  XR CHEST PA AND LATERAL    DATE: 02/07/2020 14:35    COMPARISONS: CT from 11/17/2010    CLINICAL INDICATION/ORDERING DIAGNOSES:  sob  shortness of breath    FINDINGS:    The cardiomediastinal silhouette and pulmonary vasculature are  within normal limits.    There is no evidence for pleural effusion or pneumothorax.    The lungs are clear.                                  ED Medication Orders (From admission, onward)    Ordered Start     Status Ordering Provider    02/07/20 1359 02/07/20 1400  ipratropium-albuterol (DUONEB) 0.5-2.5 (3) MG/3ML nebulizer solution 3 mL  ONCE      Last MAR action:  Given ISIDRO THIBODEAUX    02/07/20 1359 02/07/20 1400  predniSONE (DELTASONE) tablet 50 mg  ONCE      Last MAR action:  Given ISIDRO THIBODEAUX  Deneen Sheppard is a 47 year old  female  who presents to the Emergency Department with a chief complaint of SOB. Patient is non-toxic appearing, hemodynamically stable, and afebrile. Vitals are within normal limits. EPIC records were reviewed. DDx was considered but not limited to bronchitis, COPD exacerbation, pneumonia, PE, CHF exacerbation, ACS, valvular disease, arrhythmia, cardiac tamponade, PTX, anxiety, etc.. The patient was provided with duoneb and prednisone resulting in improvment of symptoms. Lab work was ordered and reviewed; ddimer neg, trop neg, no leukocytosis. CXR WNL. EKG as shown above. Results discussed with patient. Most likely dx bronchitis. Patient feels comfortable being discharged home at this time. The patient was discharged home with a diagnosis of bronchitis. The patient was advised to return to the ED if their symptoms worsened or they developed new symptoms such as SOB worsens, chest pain, hemoptysis, fever. It was recommended the patient to call PCP to schedule a follow up appointment. The patient was provided prescriptions for prednisone and albuterol. The patient's vital signs and condition were closely  observed while undergoing evaluation in the Emergency Department. The patient understands and agrees with the above plan for care. All questions and concerns were addressed. Patient was thoroughly educated on diagnosis, including treatment plan and possible etiologies. Patient was in agreement with discharge to home, no additional questions or concerns. The patient is aware they may return to the Emergency department at any time for re-valuation if needed.    The supervising physician for this case was Dr. Gomez. The patients case and plan of treatment was discussed with Dr. Gomez, who is in agreement.    Ivania Starr PA-C  02/11/20  6:15 PM    Clinical Impression     ED Diagnosis   1. Bronchitis         Disposition        Discharge 2/7/2020  4:07 PM  Deneen Sheppard discharge to home/self care.           Ivania Starr PA-C  02/11/20 1829

## 2023-02-23 DIAGNOSIS — E29.1 HYPOGONADISM IN MALE: ICD-10-CM

## 2023-02-23 RX ORDER — TESTOSTERONE 10 MG/.5G
40 GEL, METERED TOPICAL DAILY
Qty: 60 G | Refills: 2 | Status: SHIPPED | OUTPATIENT
Start: 2023-02-23 | End: 2023-04-06 | Stop reason: SDUPTHER

## 2023-02-23 NOTE — TELEPHONE ENCOUNTER
Caller: ASHLEYJHON    Relationship: Emergency Contact    Best call back number: [phone]    Requested Prescriptions:   Requested Prescriptions     Pending Prescriptions Disp Refills   • Testosterone 10 MG/ACT (2%) gel 60 g 2     Sig: Place 40 mg on the skin as directed by provider Daily.        Pharmacy where request should be sent: [facility] #[id number] [address] AT [facility] [phone] [facility] [phone] FX     Does the patient have less than a 3 day supply:  [] Yes  [x] No    Would you like a call back once the refill request has been completed: [] Yes [x] No    If the office needs to give you a call back, can they leave a voicemail: [] Yes [x] No    Jaun Patton Rep   02/23/23 09:53 EST

## 2023-03-17 RX ORDER — GABAPENTIN 600 MG/1
600 TABLET ORAL 3 TIMES DAILY
Qty: 90 TABLET | Refills: 0 | Status: SHIPPED | OUTPATIENT
Start: 2023-03-17 | End: 2023-04-06 | Stop reason: SDUPTHER

## 2023-03-17 NOTE — TELEPHONE ENCOUNTER
Caller: ASHLEYJHON    Relationship: Emergency Contact    Best call back number:    [phone]        Requested Prescriptions:   Requested Prescriptions     Pending Prescriptions Disp Refills   • gabapentin (NEURONTIN) 600 MG tablet 90 tablet 2     Sig: Take 1 tablet by mouth 3 (Three) Times a Day.        Pharmacy where request should be sent: [id number] [address] AT [facility] [phone] [facility] [phone]     Additional details provided by patient: PATIENT'S WIFE STATES THAT THEY WOULD ONLY LIKE ONE BOTTLE SENT TO THIS PHARMACY SINCE THEY WILL BE COMING HOME SOON.     Does the patient have less than a 3 day supply:  [] Yes  [x] No    Would you like a call back once the refill request has been completed: [x] Yes [] No    If the office needs to give you a call back, can they leave a voicemail: [x] Yes [] No    Jaun Brooks Rep   03/17/23 08:04 EDT

## 2023-03-20 ENCOUNTER — TELEPHONE (OUTPATIENT)
Dept: SURGERY | Facility: CLINIC | Age: 83
End: 2023-03-20
Payer: MEDICARE

## 2023-03-20 DIAGNOSIS — R19.00 ABDOMINAL MASS, UNSPECIFIED ABDOMINAL LOCATION: Primary | ICD-10-CM

## 2023-03-20 NOTE — TELEPHONE ENCOUNTER
Patient received a letter in the mail, stating he needs to come back in for a office visit.     Do you want the patient to have a CT abd/pelvis prior to office visit? He is currently living in [place] and will not be back in [place] until April.     Copy and Paste from your note dated 5-10-22:  Assessment:  Intra-abdominal mass - no concerning change in radiographic appearance on interval CT scan, prior CT guided biopsy with benign pathology, no GI symptoms reported by patient, and patient with strong desire to avoid surgery unless absolutely necessary.     Plan:  Will evaluate with MRI.  If MRI result is more consistent with non cancerous process then will proceed with watchful observation and have patient see me again in one year.  I will call patient on the telephone once I have the MRI result.     Yousif Brice MD  05/10/2022     Electronically signed by Yousif Brice MD, 5/10/22     Addendum, 6/14/22:  MRI completed and there are no findings concerning for malignancy.  Findings are more consistent with benign pathology than malignancy.  Findings were discussed with the patient.  Plan is to place the patient on a callback list to be seen in the office again in May 2023 at which time I will evaluate with an interval CT abdomen pelvis.

## 2023-03-20 NOTE — TELEPHONE ENCOUNTER
Order has been put in, patient said he is busy today and wants me to call him back tomorrow. I will go ahead and trisha CT scan.     It is scheduled for 4-24-23 at 215pm at [facility]. NPO 2 hours prior to scan.   Make sure to trisha his office visit soon after CT scan appt.

## 2023-04-06 ENCOUNTER — OFFICE VISIT (OUTPATIENT)
Dept: FAMILY MEDICINE CLINIC | Facility: CLINIC | Age: 83
End: 2023-04-06
Payer: MEDICARE

## 2023-04-06 VITALS
BODY MASS INDEX: 34.81 KG/M2 | WEIGHT: 221.8 LBS | DIASTOLIC BLOOD PRESSURE: 72 MMHG | HEIGHT: 67 IN | SYSTOLIC BLOOD PRESSURE: 132 MMHG | HEART RATE: 58 BPM | OXYGEN SATURATION: 100 % | TEMPERATURE: 97.7 F

## 2023-04-06 DIAGNOSIS — R19.01 RIGHT UPPER QUADRANT ABDOMINAL MASS: ICD-10-CM

## 2023-04-06 DIAGNOSIS — M67.40 GANGLION CYST: ICD-10-CM

## 2023-04-06 DIAGNOSIS — K42.9 UMBILICAL HERNIA WITHOUT OBSTRUCTION AND WITHOUT GANGRENE: ICD-10-CM

## 2023-04-06 DIAGNOSIS — E78.00 PURE HYPERCHOLESTEROLEMIA: ICD-10-CM

## 2023-04-06 DIAGNOSIS — E55.9 VITAMIN D DEFICIENCY: ICD-10-CM

## 2023-04-06 DIAGNOSIS — D49.2 SKIN NEOPLASM: ICD-10-CM

## 2023-04-06 DIAGNOSIS — G89.29 CHRONIC BILATERAL LOW BACK PAIN WITHOUT SCIATICA: ICD-10-CM

## 2023-04-06 DIAGNOSIS — E11.65 TYPE 2 DIABETES MELLITUS WITH HYPERGLYCEMIA, WITHOUT LONG-TERM CURRENT USE OF INSULIN: ICD-10-CM

## 2023-04-06 DIAGNOSIS — E53.8 B12 DEFICIENCY: ICD-10-CM

## 2023-04-06 DIAGNOSIS — Z51.81 MEDICATION MONITORING ENCOUNTER: ICD-10-CM

## 2023-04-06 DIAGNOSIS — I10 ESSENTIAL HYPERTENSION: ICD-10-CM

## 2023-04-06 DIAGNOSIS — M54.50 CHRONIC BILATERAL LOW BACK PAIN WITHOUT SCIATICA: ICD-10-CM

## 2023-04-06 DIAGNOSIS — N40.0 BENIGN PROSTATIC HYPERPLASIA WITHOUT LOWER URINARY TRACT SYMPTOMS: ICD-10-CM

## 2023-04-06 DIAGNOSIS — E29.1 HYPOGONADISM IN MALE: Primary | ICD-10-CM

## 2023-04-06 RX ORDER — ATORVASTATIN CALCIUM 10 MG/1
10 TABLET, FILM COATED ORAL DAILY
Qty: 90 TABLET | Refills: 3 | Status: SHIPPED | OUTPATIENT
Start: 2023-04-06

## 2023-04-06 RX ORDER — MELATONIN
1000 DAILY
Qty: 90 TABLET | Refills: 3 | Status: SHIPPED | OUTPATIENT
Start: 2023-04-06

## 2023-04-06 RX ORDER — TESTOSTERONE 10 MG/.5G
40 GEL, METERED TOPICAL DAILY
Qty: 60 G | Refills: 2 | Status: SHIPPED | OUTPATIENT
Start: 2023-04-06

## 2023-04-06 RX ORDER — FINASTERIDE 5 MG/1
5 TABLET, FILM COATED ORAL DAILY
Qty: 90 TABLET | Refills: 3 | Status: SHIPPED | OUTPATIENT
Start: 2023-04-06

## 2023-04-06 RX ORDER — LISINOPRIL 20 MG/1
20 TABLET ORAL DAILY
Qty: 90 TABLET | Refills: 3 | Status: SHIPPED | OUTPATIENT
Start: 2023-04-06

## 2023-04-06 RX ORDER — GABAPENTIN 600 MG/1
600 TABLET ORAL 3 TIMES DAILY
Qty: 90 TABLET | Refills: 0 | Status: SHIPPED | OUTPATIENT
Start: 2023-04-06 | End: 2023-04-18 | Stop reason: SDUPTHER

## 2023-04-06 RX ORDER — HYDROCHLOROTHIAZIDE 12.5 MG/1
12.5 TABLET ORAL DAILY
Qty: 90 TABLET | Refills: 3 | Status: SHIPPED | OUTPATIENT
Start: 2023-04-06

## 2023-04-06 RX ORDER — APIXABAN 5 MG/1
5 TABLET, FILM COATED ORAL 2 TIMES DAILY
Qty: 180 TABLET | Refills: 3 | Status: SHIPPED | OUTPATIENT
Start: 2023-04-06 | End: 2023-04-18 | Stop reason: SDUPTHER

## 2023-04-06 RX ORDER — FLUTICASONE PROPIONATE 50 MCG
2 SPRAY, SUSPENSION (ML) NASAL DAILY
Qty: 48 G | Refills: 3 | Status: SHIPPED | OUTPATIENT
Start: 2023-04-06

## 2023-04-06 RX ORDER — ASPIRIN 81 MG/1
81 TABLET ORAL DAILY
Qty: 90 TABLET | Refills: 3 | Status: SHIPPED | OUTPATIENT
Start: 2023-04-06

## 2023-04-06 RX ORDER — TAMSULOSIN HYDROCHLORIDE 0.4 MG/1
1 CAPSULE ORAL NIGHTLY
Qty: 90 CAPSULE | Refills: 3 | Status: SHIPPED | OUTPATIENT
Start: 2023-04-06

## 2023-04-06 RX ORDER — CETIRIZINE HYDROCHLORIDE 10 MG/1
10 TABLET ORAL DAILY
Qty: 90 TABLET | Refills: 3 | Status: SHIPPED | OUTPATIENT
Start: 2023-04-06

## 2023-04-06 RX ORDER — ALENDRONATE SODIUM 70 MG/1
70 TABLET ORAL
Qty: 13 TABLET | Refills: 3 | Status: SHIPPED | OUTPATIENT
Start: 2023-04-06

## 2023-04-06 NOTE — PROGRESS NOTES
"Chief Complaint  Hypertension    Subjective        Steve Bryson Jr. presents to [facility]  History of Present Illness  Patient presents today to follow-up for hypertension.  Blood pressure has been adequately controlled taking lisinopril 20 mg daily as well as hydrochlorothiazide 12.5 mg daily.  He continues to take B12 supplementation at 1000 mcg daily.  He continues to take Fortesta for hypogonadism.  He is currently on 40 mg daily.  His Parag, UDS, and controlled substance agreement have been an appropriate.  He is requesting a refill today.  He also continues to take gabapentin for chronic low back pain.  Medication has been beneficial in managing his symptoms.  He continues to take metformin at 1000 mg twice daily for type 2 diabetes.  His last A1c was 5.7 back in April 2022.  He is being monitored per Dr. Bustos for an abdominal mass.  He does have a CT coming up to follow-up on this.  This previously was noted to be compatible with a nonaggressive lipomatous lesion.  He previously did have a biopsy of this as well back in August 2021 showing scant benign fibroadipose tissue.  He does have a follow-up with Dr. Brice in this regard.  He has a cyst on the volar aspect of his left wrist.  It has been present for an unknown duration.  Nontender.  He also has a skin lesion under his right eye on the right side of the face.  It has been present for several years but has grown some.  He is concerned about this and would like to have it evaluated today.  Objective   Vital Signs:  /72 (BP Location: Right arm)   Pulse 58   Temp 97.7 °F (36.5 °C) (Oral)   Ht 170.2 cm (67\")   Wt 101 kg (221 lb 12.8 oz)   SpO2 100%   BMI 34.74 kg/m²   Estimated body mass index is 34.74 kg/m² as calculated from the following:    Height as of this encounter: 170.2 cm (67\").    Weight as of this encounter: 101 kg (221 lb 12.8 oz).             Physical Exam   General: AAO ×3, no acute " distress, pleasant  HEENT: Normocephalic, atraumatic.  See photo below.  Under his right eye at the nasofacial angle is a telangiectatic pearly papular lesion that measures roughly 6 x 4 mm.  Lesion is irregular and not sharply demarcated.  Cardiovascular: Regular rate and rhythm without appreciable murmur  Respiratory: Clear to auscultation bilaterally no RRW  Gastrointestinal: Soft nontender nondistended with bowel sounds present  Musculoskeletal: Cystic lesion noted on the volar aspect of his left wrist on the radial aspect.  It is mobile.  It is consistent with a ganglion cyst.  extremities: No edema  Neurologic: CN II through XII grossly intact   Psychiatric: Normal mood and affect        Result Review :                   Assessment and Plan   Diagnoses and all orders for this visit:    1. Hypogonadism in male (Primary)  -     Testosterone 10 MG/ACT (2%) gel; Place 40 mg on the skin as directed by provider Daily.  Dispense: 60 g; Refill: 2  -     Testosterone; Future    2. Medication monitoring encounter  -     Drug Screen (10), Serum; Future    3. Right upper quadrant abdominal mass    4. Chronic bilateral low back pain without sciatica    5. Ganglion cyst    6. Skin neoplasm  -     Ambulatory Referral to Dermatology    7. Umbilical hernia without obstruction and without gangrene    8. Vitamin D deficiency  -     Vitamin D,25-Hydroxy; Future    9. Essential hypertension  -     CBC & Differential; Future  -     Comprehensive Metabolic Panel; Future    10. Pure hypercholesterolemia  -     Lipid Panel; Future    11. Type 2 diabetes mellitus with hyperglycemia, without long-term current use of insulin  -     Hemoglobin A1c; Future    12. B12 deficiency  -     Vitamin B12; Future    13. Benign prostatic hyperplasia without lower urinary tract symptoms  -     PSA DIAGNOSTIC; Future    Other orders  -     aspirin 81 MG EC tablet; Take 1 tablet by mouth Daily.  Dispense: 90 tablet; Refill: 3  -     Eliquis 5 MG tablet  tablet; Take 1 tablet by mouth 2 (Two) Times a Day.  Dispense: 180 tablet; Refill: 3  -     metFORMIN (GLUCOPHAGE) 1000 MG tablet; Take 1 tablet by mouth 2 (Two) Times a Day With Meals.  Dispense: 180 tablet; Refill: 3  -     gabapentin (NEURONTIN) 600 MG tablet; Take 1 tablet by mouth 3 (Three) Times a Day.  Dispense: 90 tablet; Refill: 0  -     cetirizine (zyrTEC) 10 MG tablet; Take 1 tablet by mouth Daily.  Dispense: 90 tablet; Refill: 3  -     atorvastatin (LIPITOR) 10 MG tablet; Take 1 tablet by mouth Daily.  Dispense: 90 tablet; Refill: 3  -     lisinopril (PRINIVIL,ZESTRIL) 20 MG tablet; Take 1 tablet by mouth Daily.  Dispense: 90 tablet; Refill: 3  -     hydroCHLOROthiazide (HYDRODIURIL) 12.5 MG tablet; Take 1 tablet by mouth Daily.  Dispense: 90 tablet; Refill: 3  -     cyanocobalamin (VITAMIN B-12) 1000 MCG tablet; Take 1 tablet by mouth Daily.  Dispense: 90 tablet; Refill: 3  -     Calcium Carbonate-Vitamin D 600-10 MG-MCG per tablet; Take 2 tablets by mouth Daily.  Dispense: 180 tablet; Refill: 3  -     alendronate (FOSAMAX) 70 MG tablet; Take 1 tablet by mouth Every 7 (Seven) Days. SUNDAY  Dispense: 13 tablet; Refill: 3  -     finasteride (PROSCAR) 5 MG tablet; Take 1 tablet by mouth Daily.  Dispense: 90 tablet; Refill: 3  -     tamsulosin (FLOMAX) 0.4 MG capsule 24 hr capsule; Take 1 capsule by mouth Every Night.  Dispense: 90 capsule; Refill: 3  -     fluticasone (FLONASE) 50 MCG/ACT nasal spray; 2 sprays into the nostril(s) as directed by provider Daily.  Dispense: 48 g; Refill: 3  -     cholecalciferol (VITAMIN D3) 25 MCG (1000 UT) tablet; Take 1 tablet by mouth Daily.  Dispense: 90 tablet; Refill: 3    I discussed with patient continue current treatment for his chronic conditions.  He is due for labs so we discussed getting these done.  I did encourage him to follow-up with Dr. Brice in regards to the right upper quadrant abdominal mass.  He is asymptomatic from this.  He also has an umbilical  hernia which she is considering having treatment for.  He will discuss this with Dr. Brice as well.  I did discuss with him referral to dermatology for the skin lesion on his face.  I am concerned about skin neoplasm.         Follow Up   Return in about 3 months (around 7/6/2023) for hypogonadism.  Patient was given instructions and counseling regarding his condition or for health maintenance advice. Please see specific information pulled into the AVS if appropriate.

## 2023-04-07 ENCOUNTER — LAB (OUTPATIENT)
Dept: FAMILY MEDICINE CLINIC | Facility: CLINIC | Age: 83
End: 2023-04-07
Payer: MEDICARE

## 2023-04-07 DIAGNOSIS — E11.65 TYPE 2 DIABETES MELLITUS WITH HYPERGLYCEMIA, WITHOUT LONG-TERM CURRENT USE OF INSULIN: ICD-10-CM

## 2023-04-07 DIAGNOSIS — N40.0 BENIGN PROSTATIC HYPERPLASIA WITHOUT LOWER URINARY TRACT SYMPTOMS: ICD-10-CM

## 2023-04-07 DIAGNOSIS — E53.8 B12 DEFICIENCY: ICD-10-CM

## 2023-04-07 DIAGNOSIS — E29.1 HYPOGONADISM IN MALE: ICD-10-CM

## 2023-04-07 DIAGNOSIS — E78.00 PURE HYPERCHOLESTEROLEMIA: ICD-10-CM

## 2023-04-07 DIAGNOSIS — I10 ESSENTIAL HYPERTENSION: ICD-10-CM

## 2023-04-07 DIAGNOSIS — E55.9 VITAMIN D DEFICIENCY: ICD-10-CM

## 2023-04-07 DIAGNOSIS — Z51.81 MEDICATION MONITORING ENCOUNTER: ICD-10-CM

## 2023-04-07 LAB
25(OH)D3 SERPL-MCNC: 53.3 NG/ML (ref 30–100)
ALBUMIN SERPL-MCNC: 4.4 G/DL (ref 3.5–5.2)
ALBUMIN/GLOB SERPL: 1.5 G/DL
ALP SERPL-CCNC: 70 U/L (ref 39–117)
ALT SERPL W P-5'-P-CCNC: 46 U/L (ref 1–41)
ANION GAP SERPL CALCULATED.3IONS-SCNC: 9 MMOL/L (ref 5–15)
AST SERPL-CCNC: 36 U/L (ref 1–40)
BASOPHILS # BLD AUTO: 0.09 10*3/MM3 (ref 0–0.2)
BASOPHILS NFR BLD AUTO: 1.3 % (ref 0–1.5)
BILIRUB SERPL-MCNC: 0.7 MG/DL (ref 0–1.2)
BUN SERPL-MCNC: 11 MG/DL (ref 8–23)
BUN/CREAT SERPL: 11.1 (ref 7–25)
CALCIUM SPEC-SCNC: 10.9 MG/DL (ref 8.6–10.5)
CHLORIDE SERPL-SCNC: 103 MMOL/L (ref 98–107)
CHOLEST SERPL-MCNC: 126 MG/DL (ref 0–200)
CO2 SERPL-SCNC: 29 MMOL/L (ref 22–29)
CREAT SERPL-MCNC: 0.99 MG/DL (ref 0.76–1.27)
DEPRECATED RDW RBC AUTO: 46.5 FL (ref 37–54)
EGFRCR SERPLBLD CKD-EPI 2021: 77 ML/MIN/1.73
EOSINOPHIL # BLD AUTO: 0.46 10*3/MM3 (ref 0–0.4)
EOSINOPHIL NFR BLD AUTO: 6.8 % (ref 0.3–6.2)
ERYTHROCYTE [DISTWIDTH] IN BLOOD BY AUTOMATED COUNT: 13 % (ref 12.3–15.4)
GLOBULIN UR ELPH-MCNC: 2.9 GM/DL
GLUCOSE SERPL-MCNC: 113 MG/DL (ref 65–99)
HBA1C MFR BLD: 5.9 % (ref 4.8–5.6)
HCT VFR BLD AUTO: 45.9 % (ref 37.5–51)
HDLC SERPL-MCNC: 28 MG/DL (ref 40–60)
HGB BLD-MCNC: 16.1 G/DL (ref 13–17.7)
IMM GRANULOCYTES # BLD AUTO: 0.01 10*3/MM3 (ref 0–0.05)
IMM GRANULOCYTES NFR BLD AUTO: 0.1 % (ref 0–0.5)
LDLC SERPL CALC-MCNC: 60 MG/DL (ref 0–100)
LDLC/HDLC SERPL: 1.84 {RATIO}
LYMPHOCYTES # BLD AUTO: 1.96 10*3/MM3 (ref 0.7–3.1)
LYMPHOCYTES NFR BLD AUTO: 29.2 % (ref 19.6–45.3)
MCH RBC QN AUTO: 33.8 PG (ref 26.6–33)
MCHC RBC AUTO-ENTMCNC: 35.1 G/DL (ref 31.5–35.7)
MCV RBC AUTO: 96.2 FL (ref 79–97)
MONOCYTES # BLD AUTO: 0.59 10*3/MM3 (ref 0.1–0.9)
MONOCYTES NFR BLD AUTO: 8.8 % (ref 5–12)
NEUTROPHILS NFR BLD AUTO: 3.61 10*3/MM3 (ref 1.7–7)
NEUTROPHILS NFR BLD AUTO: 53.8 % (ref 42.7–76)
NRBC BLD AUTO-RTO: 0.1 /100 WBC (ref 0–0.2)
PLATELET # BLD AUTO: 205 10*3/MM3 (ref 140–450)
PMV BLD AUTO: 11.4 FL (ref 6–12)
POTASSIUM SERPL-SCNC: 3.8 MMOL/L (ref 3.5–5.2)
PROT SERPL-MCNC: 7.3 G/DL (ref 6–8.5)
PSA SERPL-MCNC: 2.22 NG/ML (ref 0–4)
RBC # BLD AUTO: 4.77 10*6/MM3 (ref 4.14–5.8)
SODIUM SERPL-SCNC: 141 MMOL/L (ref 136–145)
TESTOST SERPL-MCNC: 361 NG/DL (ref 193–740)
TRIGL SERPL-MCNC: 232 MG/DL (ref 0–150)
VIT B12 BLD-MCNC: 1872 PG/ML (ref 211–946)
VLDLC SERPL-MCNC: 38 MG/DL (ref 5–40)
WBC NRBC COR # BLD: 6.72 10*3/MM3 (ref 3.4–10.8)

## 2023-04-07 PROCEDURE — 80053 COMPREHEN METABOLIC PANEL: CPT | Performed by: FAMILY MEDICINE

## 2023-04-07 PROCEDURE — 84403 ASSAY OF TOTAL TESTOSTERONE: CPT | Performed by: FAMILY MEDICINE

## 2023-04-07 PROCEDURE — 82306 VITAMIN D 25 HYDROXY: CPT | Performed by: FAMILY MEDICINE

## 2023-04-07 PROCEDURE — 80307 DRUG TEST PRSMV CHEM ANLYZR: CPT | Performed by: FAMILY MEDICINE

## 2023-04-07 PROCEDURE — 80061 LIPID PANEL: CPT | Performed by: FAMILY MEDICINE

## 2023-04-07 PROCEDURE — 82607 VITAMIN B-12: CPT | Performed by: FAMILY MEDICINE

## 2023-04-07 PROCEDURE — 83036 HEMOGLOBIN GLYCOSYLATED A1C: CPT | Performed by: FAMILY MEDICINE

## 2023-04-07 PROCEDURE — 36415 COLL VENOUS BLD VENIPUNCTURE: CPT | Performed by: FAMILY MEDICINE

## 2023-04-07 PROCEDURE — 84153 ASSAY OF PSA TOTAL: CPT | Performed by: FAMILY MEDICINE

## 2023-04-07 PROCEDURE — 85025 COMPLETE CBC W/AUTO DIFF WBC: CPT | Performed by: FAMILY MEDICINE

## 2023-04-11 LAB
AMPHETAMINES SERPL QL SCN: NEGATIVE NG/ML
BARBITURATES SERPL QL SCN: NEGATIVE UG/ML
BENZODIAZ SERPL QL SCN: NEGATIVE NG/ML
CANNABINOIDS SERPL QL SCN: NEGATIVE NG/ML
COCAINE+BZE SERPL QL SCN: NEGATIVE NG/ML
METHADONE SERPL QL SCN: NEGATIVE NG/ML
OPIATES SERPL QL SCN: NEGATIVE NG/ML
OXYCODONE+OXYMORPHONE SERPLBLD QL SCN: NEGATIVE NG/ML
PCP SERPL QL SCN: NEGATIVE NG/ML
PROPOXYPH SERPL QL SCN: NEGATIVE NG/ML

## 2023-04-12 ENCOUNTER — TELEPHONE (OUTPATIENT)
Dept: FAMILY MEDICINE CLINIC | Facility: CLINIC | Age: 83
End: 2023-04-12
Payer: MEDICARE

## 2023-04-12 NOTE — TELEPHONE ENCOUNTER
Phone call received from patient's wife reviewing lab results/instructions with voiced understanding.

## 2023-04-14 RX ORDER — METOPROLOL SUCCINATE 25 MG/1
25 TABLET, EXTENDED RELEASE ORAL DAILY
Qty: 90 TABLET | Refills: 3 | Status: SHIPPED | OUTPATIENT
Start: 2023-04-14 | End: 2023-04-18 | Stop reason: SDUPTHER

## 2023-04-14 NOTE — TELEPHONE ENCOUNTER
Patient was found to have a significant elevation of proBNP (13,337)  However patient is volume depleted at this time  Will continue IV fluid and proceed with echocardiogram in a.m.    05/22 euvolemic and IV fluids stopped as long as not needed for hydration waiting for a procedure   Patient's wife called stating that when picking up patient's medication from pharmacy, all meds were there except for Metoprolol XL 25mg. Patient wanted to know why, as patient was still taking this medication. Informed patient that the medication had been discontinued but I did not see any documentation as to why. Informed patient that I would speak to a provider as PCP is out of office today. Consulted Dr. Cherry who advised to send in medication for patient. Refill sent to pharmacy.

## 2023-04-16 NOTE — PROGRESS NOTES
[facility]  Cardiology progress Note    Patient Name: Steve Bryson Jr.  : 1940    CHIEF COMPLAINT  Atrial fibrillation        Subjective   Subjective     HISTORY OF PRESENT ILLNESS    Steve Bryson Jr. is a 82 y.o. male with history of atrial fibrillation.  No palpitations.  No chest pain.    REVIEW OF SYSTEMS    Constitutional:    No fever, no weight loss  Skin:     No rash  Otolaryngeal:    No difficulty swallowing  Cardiovascular: See HPI.  Pulmonary:    No cough, no sputum production    Personal History     Social History:    reports that he quit smoking about 42 years ago. His smoking use included cigarettes. He started smoking about 62 years ago. He has a 30.00 pack-year smoking history. He has never used smokeless tobacco. He reports that he does not currently use alcohol. He reports that he does not use drugs.    Home Medications:  Current Outpatient Medications on File Prior to Visit   Medication Sig   • alendronate (FOSAMAX) 70 MG tablet Take 1 tablet by mouth Every 7 (Seven) Days.    • aspirin 81 MG EC tablet Take 1 tablet by mouth Daily.   • atorvastatin (LIPITOR) 10 MG tablet Take 1 tablet by mouth Daily.   • Calcium Carbonate-Vitamin D 600-10 MG-MCG per tablet Take 2 tablets by mouth Daily.   • cetirizine (zyrTEC) 10 MG tablet Take 1 tablet by mouth Daily.   • cholecalciferol (VITAMIN D3) 25 MCG (1000 UT) tablet Take 1 tablet by mouth Daily.   • cyanocobalamin (VITAMIN B-12) 1000 MCG tablet Take 1 tablet by mouth Daily.   • finasteride (PROSCAR) 5 MG tablet Take 1 tablet by mouth Daily.   • fluticasone (FLONASE) 50 MCG/ACT nasal spray 2 sprays into the nostril(s) as directed by provider Daily.   • hydroCHLOROthiazide (HYDRODIURIL) 12.5 MG tablet Take 1 tablet by mouth Daily.   • lisinopril (PRINIVIL,ZESTRIL) 20 MG tablet Take 1 tablet by mouth Daily.   • metFORMIN (GLUCOPHAGE) 1000 MG tablet Take 1 tablet by mouth 2 (Two) Times a Day With Meals.   • tamsulosin  (FLOMAX) 0.4 MG capsule 24 hr capsule Take 1 capsule by mouth Every Night.   • Testosterone 10 MG/ACT (2%) gel Place 40 mg on the skin as directed by provider Daily.   • [DISCONTINUED] Eliquis 5 MG tablet tablet Take 1 tablet by mouth 2 (Two) Times a Day.   • [DISCONTINUED] metoprolol succinate XL (TOPROL-XL) 25 MG 24 hr tablet Take 1 tablet by mouth Daily. (Patient taking differently: Take 1 tablet by mouth 2 (Two) Times a Day.)   • [DISCONTINUED] gabapentin (NEURONTIN) 600 MG tablet Take 1 tablet by mouth 3 (Three) Times a Day.     No current facility-administered medications on file prior to visit.       Past Medical History:   Diagnosis Date   • Arrhythmia    • BPH with urinary obstruction 05/01/2014   • Diabetes mellitus    • Epididymal mass    • Essential hypertension 7/13/2021   • Follicular adenoma of thyroid gland 05/19/2021    RIGHT   • Hypogonadism, testicular    • Mixed hyperlipidemia 7/13/2021   • Paroxysmal atrial fibrillation 7/3/2021   • Seizure     once per wife butbwas diagnoised with afib       Allergies:  No Known Allergies    Objective    Objective       Vitals:   Heart Rate:  [60] 60  BP: (138)/(66) 138/66  Body mass index is 34.77 kg/m².     PHYSICAL EXAM:    General Appearance:   · well developed  · well nourished  HENT:   · oropharynx moist  · lips not cyanotic  Neck:  · thyroid not enlarged  · supple  Respiratory:  · no respiratory distress  · normal breath sounds  · no rales  Cardiovascular:  · no jugular venous distention  · regular rhythm  · apical impulse normal  · S1 normal, S2 normal  · no S3, no S4   · no murmur  · no rub, no thrill  · carotid pulses normal; no bruit  · pedal pulses normal  · lower extremity edema: none    Skin:   · warm, dry  Psychiatric:  · judgement and insight appropriate  · normal mood and affect        Result Review:  I have personally reviewed the available results from  [x]  Laboratory  [x]  EKG  [x]  Cardiology  [x]  Medications  [x]  Old records  []   Other:     Procedures  Lab Results   Component Value Date    CHOL 126 04/07/2023    CHOL 118 04/18/2022    CHOL 108 08/24/2021     Lab Results   Component Value Date    TRIG 232 (H) 04/07/2023    TRIG 202 (H) 04/18/2022    TRIG 130 08/24/2021     Lab Results   Component Value Date    HDL 28 (L) 04/07/2023    HDL 28 (L) 04/18/2022    HDL 26 (L) 08/24/2021     Lab Results   Component Value Date    LDL 60 04/07/2023    LDL 57 04/18/2022    LDL 59 08/24/2021     Lab Results   Component Value Date    VLDL 38 04/07/2023    VLDL 33 04/18/2022    VLDL 23 08/24/2021     Results for orders placed during the hospital encounter of 07/02/21    Adult Transthoracic Echo Complete W/ Cont if Necessary Per Protocol    Interpretation Summary  1.  Normal left ventricular systolic function.  2.  Fibrocalcific mitral and aortic valves.  3.  Mild tricuspid regurgitation and trace mitral regurgitation.  4.  Trace aortic regurgitation.  5.  Biatrial enlargement noted.     Impression/Plan:  1.  Essential hypertension controlled: Continue lisinopril 20 mg once a day.  Continue Toprol-XL 25 mg twice a day.  Monitor blood pressure regularly.  2.  Hyperlipidemia: Continue Lipitor 10 mg once a day.  Monitor lipid and hepatic profile.  3.  Paroxysmal atrial fibrillation: Continue Eliquis 5 mg twice a day for stroke prevention.  Able to tolerate Eliquis without any side effects.  Continue Toprol-XL 25 mg twice a day for rate control.           Brian Muniz MD   04/18/23   10:27 EDT

## 2023-04-18 ENCOUNTER — TELEPHONE (OUTPATIENT)
Dept: FAMILY MEDICINE CLINIC | Facility: CLINIC | Age: 83
End: 2023-04-18

## 2023-04-18 ENCOUNTER — OFFICE VISIT (OUTPATIENT)
Dept: CARDIOLOGY | Facility: CLINIC | Age: 83
End: 2023-04-18
Payer: MEDICARE

## 2023-04-18 VITALS
HEIGHT: 67 IN | DIASTOLIC BLOOD PRESSURE: 66 MMHG | SYSTOLIC BLOOD PRESSURE: 138 MMHG | HEART RATE: 60 BPM | BODY MASS INDEX: 34.84 KG/M2 | WEIGHT: 222 LBS

## 2023-04-18 DIAGNOSIS — I48.0 PAROXYSMAL ATRIAL FIBRILLATION: Primary | ICD-10-CM

## 2023-04-18 DIAGNOSIS — R06.02 SHORTNESS OF BREATH: ICD-10-CM

## 2023-04-18 DIAGNOSIS — I10 HYPERTENSION, ESSENTIAL: ICD-10-CM

## 2023-04-18 RX ORDER — GABAPENTIN 600 MG/1
600 TABLET ORAL 3 TIMES DAILY
Qty: 90 TABLET | Refills: 2 | Status: SHIPPED | OUTPATIENT
Start: 2023-04-18

## 2023-04-18 RX ORDER — APIXABAN 5 MG/1
5 TABLET, FILM COATED ORAL 2 TIMES DAILY
Qty: 180 TABLET | Refills: 3 | Status: SHIPPED | OUTPATIENT
Start: 2023-04-18

## 2023-04-18 RX ORDER — METOPROLOL SUCCINATE 25 MG/1
25 TABLET, EXTENDED RELEASE ORAL 2 TIMES DAILY
Qty: 180 TABLET | Refills: 5 | Status: SHIPPED | OUTPATIENT
Start: 2023-04-18

## 2023-04-18 NOTE — TELEPHONE ENCOUNTER
Caller: JHON RAMIREZ    Relationship: Emergency Contact    Best call back number:[phone]    What is the best time to reach you: ANYTIME    Who are you requesting to speak with (clinical staff, provider,  specific staff member): NURSE     What was the call regarding: PATIENT'S WIFE CALL AND SAID THAT THEY DO NOT NEED THE GABAPENTIN, THAT THEY GOT IT FILLED ALREADY.

## 2023-04-24 ENCOUNTER — HOSPITAL ENCOUNTER (OUTPATIENT)
Dept: CT IMAGING | Facility: HOSPITAL | Age: 83
Discharge: HOME | End: 2023-04-24
Admitting: SURGERY
Payer: MEDICARE

## 2023-04-24 DIAGNOSIS — R19.00 ABDOMINAL MASS, UNSPECIFIED ABDOMINAL LOCATION: ICD-10-CM

## 2023-04-24 PROCEDURE — 74178 CT ABD&PLV WO CNTR FLWD CNTR: CPT

## 2023-04-24 PROCEDURE — 25510000001 IOPAMIDOL PER 1 ML: Performed by: SURGERY

## 2023-04-24 RX ADMIN — IOPAMIDOL 100 ML: 755 INJECTION, SOLUTION INTRAVENOUS at 13:45

## 2023-04-26 DIAGNOSIS — E29.1 HYPOGONADISM IN MALE: ICD-10-CM

## 2023-04-26 RX ORDER — TESTOSTERONE 10 MG/.5G
40 GEL, METERED TOPICAL DAILY
Qty: 60 G | Refills: 3 | Status: CANCELLED | OUTPATIENT
Start: 2023-04-26

## 2023-04-26 RX ORDER — GABAPENTIN 600 MG/1
600 TABLET ORAL 3 TIMES DAILY
Qty: 90 TABLET | Refills: 2 | Status: CANCELLED | OUTPATIENT
Start: 2023-04-26

## 2023-04-26 NOTE — TELEPHONE ENCOUNTER
Please call and check to see if the scripts are needed.  He had a prescription for testosterone written on 4/6/2023 with refills as well as a prescription for gabapentin with 2 refills.  See telephone message from 4/18/2023 where she mentioned not needing gabapentin anymore.

## 2023-04-27 ENCOUNTER — TELEPHONE (OUTPATIENT)
Dept: FAMILY MEDICINE CLINIC | Facility: CLINIC | Age: 83
End: 2023-04-27
Payer: MEDICARE

## 2023-04-27 NOTE — TELEPHONE ENCOUNTER
Phone call placed to patient with review of medications pickup at pharmacy with not refills needed at this time with voiced understanding.

## 2023-05-01 ENCOUNTER — OFFICE VISIT (OUTPATIENT)
Dept: SURGERY | Facility: CLINIC | Age: 83
End: 2023-05-01
Payer: MEDICARE

## 2023-05-01 VITALS — RESPIRATION RATE: 16 BRPM | WEIGHT: 223 LBS | BODY MASS INDEX: 35 KG/M2 | HEIGHT: 67 IN

## 2023-05-01 DIAGNOSIS — R19.00 ABDOMINAL MASS, UNSPECIFIED ABDOMINAL LOCATION: Primary | ICD-10-CM

## 2023-05-01 NOTE — PROGRESS NOTES
"Chief Complaint:  Follow-up    Primary Care Provider: Ok Moore DO    Referring Provider: No ref. provider found     History of Present Illness  Steve Bryson Jr. is a 82 y.o. male here for scheduled follow-up for an intra-abdominal mass.  A copy and paste of the HPI and A/P from my last office visit with the patient on 5/10/22 is available below.  Patient continues to do fine.  He has no abdominal pain.  He can eat fine.  Normal bowel function.  He just returned from [place] where he leaves go with his wife every September to stay for the winter.  He does not want to have any surgery done unless absolutely necessary.  CT a/p was done on 4/24/23 and showed \"large encapsulated fat density mass in the right abdomen measures up to about 19 centimeters similar to prior CT from 8/4/2021.  Calcification and fat necrosis seen without new enhancing tissue.  This causes mass effect and is similar to previous CT and MRI.\"  The original CT scan when the mass was first seen was done in Aug 2021.  MRI was done after I last saw the patient on 5/10/22 and it showed \"findings are most compatible with a nonaggressive lipomatous lesion.\"  CBC and CMP on 4/7/23 were essentially normal.     Copy & Paste of HPI from Office Visit on 5/10/22  History of Present Illness   Patient is here for a 1 year follow-up after an intra-abdominal mass was found incidentally on a CT abdomen pelvis.  A copy and paste of the background information from my initial office visit on 8/6/2021 is available below.  A copy of the plan from that office visit is also very available below.  CT-guided biopsy was done and pathology showed only benign fibroadipose tissue and a copy of the office note from 8/27/2021 about that is also available below.  Patient continues to do very well.  He has no problems at all eating food.  He does not have any abdominal pain, constipation, or GI symptoms.  A follow-up CT scan was done on 4/28/22 (prior CT scan was " "done on 8/4/21) and it showed \"no significant interval change in appearance of the large, thinly capsulated predominantly fatty density mass in the right abdomen containing coarse central calcification.  Differential diagnosis again includes a large lipoma with central fat necrosis.  However, liposarcoma is also in the differential diagnosis.  Further evaluation by MRI with and without contrast may be of value to better evaluate for enhancement/vascularity.\"  I reviewed the CT scan images myself.  Patient does not want to have any surgery.     Copy and Paste of Note from 8/27/21 Office Visit  Patient was previously seen by me on 8/6/2021 for an intra-abdominal mass.  See that office note for details.  I decided together with the patient to perform a CT-guided biopsy.  The biopsy was done and showed benign fibroadipose tissue.  The chance for sampling area was discussed with the patient.  Although the mass has benign imaging characteristics there is a possibility for malignancy.  This was discussed with the patient.  The mass seems to be completely asymptomatic.  The patient says he is going to [place] for the winter and is not going to change his plans.  He leaves in a week or twp.  The patient will return to [place] sometime in April 2022.  I will order a CT abdomen pelvis to be done sometime in May 2022.  If the CT scan does not show any concerning changes, and the patient still does not have any symptoms then we will likely proceed with watcful observation.  Further decision making sometime next year after have follow-up CT scan result.     Copy and Paste of HPI from 8/6/21 Office Visit  Steve Bryson JrDell is a 81 y.o. male referred by No ref. provider found for an intra-abdominal mass found incidentally on a CT abdomen pelvis. CT abdomen pelvis was done because the patient has 2 lumbar compression fractures. Copy and paste of the radiology report will be available below. I reviewed the CT scan images " myself. 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 Patient denies any abdominal pain. Does not have any abdominal symptoms. Patient last had a colonoscopy 8 to 10 years ago. Patient is leaving to go to [place] for the winter sometime in the first week or two in September.     Copy and Paste of Plan from 8/6/21 Office Visit  CT guided biopsy. I will call the patient on the telephone once I have the biopsy result.     Copy and Paste of A/P from Office Visit on 5/10/22  Assessment:  Intra-abdominal mass - no concerning change in radiographic appearance on interval CT scan, prior CT guided biopsy with benign pathology, no GI symptoms reported by patient, and patient with strong desire to avoid surgery unless absolutely necessary.     Plan:  Will evaluate with MRI.  If MRI result is more consistent with non cancerous process then will proceed with watchful observation and have patient see me again in one year.  I will call patient on the telephone once I have the MRI result.    History of Present Illness  Steve Bryson Jr. is a 82 y.o. male referred by No ref. provider found    Allergies: Patient has no known allergies.    Outpatient Medications Marked as Taking for the 5/1/23 encounter (Office Visit) with Yousif Brice MD   Medication Sig Dispense Refill   • alendronate (FOSAMAX) 70 MG tablet Take 1 tablet by mouth Every 7 (Seven) Days. SUNDAY 13 tablet 3   • aspirin 81 MG EC tablet Take 1 tablet by mouth Daily. 90 tablet 3   • atorvastatin (LIPITOR) 10 MG tablet Take 1 tablet by mouth Daily. 90 tablet 3   • Calcium Carbonate-Vitamin D 600-10 MG-MCG per tablet Take 2 tablets by mouth Daily. 180 tablet 3   • cetirizine (zyrTEC) 10 MG tablet Take 1 tablet by mouth Daily. 90 tablet 3   • cholecalciferol (VITAMIN D3) 25 MCG (1000 UT) tablet Take 1 tablet by mouth Daily. 90 tablet 3   •  cyanocobalamin (VITAMIN B-12) 1000 MCG tablet Take 1 tablet by mouth Daily. 90 tablet 3   • Eliquis 5 MG tablet tablet Take 1 tablet by mouth 2 (Two) Times a Day. 180 tablet 3   • finasteride (PROSCAR) 5 MG tablet Take 1 tablet by mouth Daily. 90 tablet 3   • fluticasone (FLONASE) 50 MCG/ACT nasal spray 2 sprays into the nostril(s) as directed by provider Daily. 48 g 3   • gabapentin (NEURONTIN) 600 MG tablet Take 1 tablet by mouth 3 (Three) Times a Day. 90 tablet 2   • hydroCHLOROthiazide (HYDRODIURIL) 12.5 MG tablet Take 1 tablet by mouth Daily. 90 tablet 3   • lisinopril (PRINIVIL,ZESTRIL) 20 MG tablet Take 1 tablet by mouth Daily. 90 tablet 3   • metFORMIN (GLUCOPHAGE) 1000 MG tablet Take 1 tablet by mouth 2 (Two) Times a Day With Meals. 180 tablet 3   • metoprolol succinate XL (TOPROL-XL) 25 MG 24 hr tablet Take 1 tablet by mouth 2 (Two) Times a Day. 180 tablet 5   • tamsulosin (FLOMAX) 0.4 MG capsule 24 hr capsule Take 1 capsule by mouth Every Night. 90 capsule 3   • Testosterone 10 MG/ACT (2%) gel Place 40 mg on the skin as directed by provider Daily. 60 g 2       Past Medical History:   • Arrhythmia   • BPH with urinary obstruction   • Diabetes mellitus   • Epididymal mass   • Essential hypertension   • Follicular adenoma of thyroid gland    RIGHT   • Hypogonadism, testicular   • Mixed hyperlipidemia   • Paroxysmal atrial fibrillation   • Seizure    once per wife butbwas diagnoised with afib        Past Surgical History:   • COLONOSCOPY    15 YEARS AGO    • MOLE REMOVAL    5/1/14-HAS NOT REC'D PATHOLOGY RESULTS YET   • THYROIDECTOMY, PARTIAL   • TOTAL KNEE ARTHROPLASTY       Family History:   Family History   Problem Relation Age of Onset   • Clotting disorder Mother    • Hypertension Mother    • Emphysema Father         Social History:  Social History     Tobacco Use   • Smoking status: Former     Packs/day: 2.00     Years: 15.00     Pack years: 30.00     Types: Cigarettes     Start date: 1961     Quit  "date:      Years since quittin.3   • Smokeless tobacco: Never   • Tobacco comments:     14-QUIT 25-30 YEARS AGO   Substance Use Topics   • Alcohol use: Not Currently       Objective     Vital Signs:  Resp 16   Ht 170.2 cm (67\")   Wt 101 kg (223 lb)   BMI 34.93 kg/m²   • Constitutional: alert, no acute distress, reliable historian, wife present  • HENT:  NCAT, no visible deformities or lesions  • Eyes:  sclerae clear, conjunctivae clear, EOMI  • Neck:  normal appearance, no masses, trachea midline  • Respiratory:  breathing not labored, respiratory effort appears normal  • Cardiovascular:  heart regular rate  • Abdomen:  soft, nontender, nondistended, soft mass is palpable at right mid abdomen  • Skin and subcutaneous tissue:  no visible concerning rashes or lesions, no jaundice  • Musculoskeletal: moving all extremities symmetrically and purposefully  • Neurologic:  no obvious motor or sensory deficits, normal gait, able to stand without difficulty, cerebellar function without any obvious abnormalities, alert & oriented x 3, speech clear  • Psychiatric:  judgment and insight intact, mood normal, affect appropriate, cooperative      Assessment:  Intra-abdominal mass - no concerning change with radiographic appearance on interval CT scan, prior CT guided biopsy with benign pathology, prior MRI more consistent with nonmalignant than malignant process, no GI symptoms reported by patient    Plan:  Will continue watchful observation.  However, will refer for a second opinion from a surgeon at [facility] or [facility] who likely has more experience than I do evaluating an intra-abdominal mass of this nature.  Will set up a referral for the patient.    Yousif Brice MD  2023    Electronically signed by Yousif Brice MD, 23, 7:59 PM EDT.        "

## 2023-05-03 ENCOUNTER — TELEPHONE (OUTPATIENT)
Dept: SURGERY | Facility: CLINIC | Age: 83
End: 2023-05-03
Payer: MEDICARE

## 2023-05-03 DIAGNOSIS — R19.00 ABDOMINAL MASS, UNSPECIFIED ABDOMINAL LOCATION: Primary | ICD-10-CM

## 2023-05-03 NOTE — TELEPHONE ENCOUNTER
I CALLED THE PATIENT AND TOLD HIM THAT [facility] HAS FAXED THE RECORDS TO DR. ADRIAN.  THE PLAN OF CARE WOULD BE UP TO DR. ADRIAN.

## 2023-05-03 NOTE — TELEPHONE ENCOUNTER
----- Message from Yousif Brice MD sent at 5/2/2023  6:39 PM EDT -----  Please set up a referral for this patient to see Dr. Jerald Salomon at [facility].  Reason for referral is large intra-abdominal mass.  Thank you.

## 2023-05-03 NOTE — TELEPHONE ENCOUNTER
Have called Dr. Salomon's office and spoke with Bautista to [facility] office visit. It is scheduled for 5-12-23 at 12:15pm at [facility] [address]. I have faxed all records to [phone].     Tried calling pt, no answer. LMOM    If pt calls back please relay appt info to him. Thanks.

## 2023-05-03 NOTE — TELEPHONE ENCOUNTER
Patient wants to know if the doctor will have all his records, and review those for second opinion.  I told him Nicole faxed the records.  He wants to know, because he doesn't want to start all over with another biopsy or scans.

## 2023-05-08 ENCOUNTER — TELEPHONE (OUTPATIENT)
Dept: FAMILY MEDICINE CLINIC | Facility: CLINIC | Age: 83
End: 2023-05-08
Payer: MEDICARE

## 2023-05-08 NOTE — TELEPHONE ENCOUNTER
Caller: JHON RAMIREZ    Relationship: Emergency Contact    Best call back number: [phone]    What form or medical record are you requesting: LIST OF ALL MEDICATIONS HE IS TAKING PRINTED OUT    Who is requesting this form or medical record from you: WIFE    How would you like to receive the form or medical records (pick-up, mail, fax):   If fax, what is the fax number: N/A  If mail, what is the address: N/A  If pick-up, provide patient with address and location details    Timeframe paperwork needed: ASAP    Additional notes:PATIENT NEEDS A PRINT OUT OF HIS MEDICATION LIST FOR CONTINUED CARE WITH ANOTHER PROVIDER. PLEASE PRINT AND HAVE READY FOR  AT THE .

## 2023-05-08 NOTE — TELEPHONE ENCOUNTER
Daly with [facility]     Requesting 90 day supply with 3 refills of the following medication sent to [facility]     Testosterone 10 MG/ACT (2%) gel    gabapentin (NEURONTIN) 600 MG tablet     She mentioned they would like them escribed rather than faxed.     Ph. [phone]  Fax [phone]

## 2023-05-10 NOTE — TELEPHONE ENCOUNTER
Please contact requesting person from [employer].  Patient is not requesting refills at this time so this is not appropriate.  I contacted patient in this regard.

## 2023-07-27 DIAGNOSIS — R16.0 LIVER MASS: Primary | ICD-10-CM

## 2023-08-11 ENCOUNTER — TELEPHONE (OUTPATIENT)
Dept: FAMILY MEDICINE CLINIC | Facility: CLINIC | Age: 83
End: 2023-08-11

## 2023-08-11 NOTE — TELEPHONE ENCOUNTER
Caller: JHON RAMIREZ    Relationship: Emergency Contact    Best call back number:     [phone]      What is the best time to reach you: NO CALLBACK NEEDED.     Who are you requesting to speak with (clinical staff, provider,  specific staff member): CLINICAL    What was the call regarding: PATIENT'S WIFE STATES THAT THEY RECEIVED A LETTER STATING THAT THE PATIENT NEEDED TO SCHEDULE AN MRI OF HIS ABDOMEN. SHE STATES THAT HE HAS ALREADY HAD TWO DONE THIS SUMMER, ONE OF WHICH WAS AT [facility].    Is it okay if the provider responds through MyChart: NO

## 2023-08-15 DIAGNOSIS — E29.1 HYPOGONADISM IN MALE: ICD-10-CM

## 2023-08-15 RX ORDER — TESTOSTERONE 10 MG/.5G
40 GEL, METERED TOPICAL DAILY
Qty: 60 G | Refills: 2 | Status: SHIPPED | OUTPATIENT
Start: 2023-08-15

## 2023-08-15 RX ORDER — GABAPENTIN 600 MG/1
600 TABLET ORAL 3 TIMES DAILY
Qty: 90 TABLET | Refills: 2 | Status: SHIPPED | OUTPATIENT
Start: 2023-08-15

## 2023-08-15 RX ORDER — TESTOSTERONE 10 MG/.5G
40 GEL, METERED TOPICAL DAILY
Qty: 60 G | Refills: 2 | Status: CANCELLED | OUTPATIENT
Start: 2023-08-15

## 2023-08-30 NOTE — PROGRESS NOTES
[facility]  Cardiology progress Note    Patient Name: Steve Bryson Jr.  : 1940    CHIEF COMPLAINT  Atrial fibrillation        Subjective   Subjective     HISTORY OF PRESENT ILLNESS    Steve Bryson Jr. is a 82 y.o. male with history of atrial fibrillation hypertension.  No palpitations.    REVIEW OF SYSTEMS    Constitutional:    No fever, no weight loss  Skin:     No rash  Otolaryngeal:    No difficulty swallowing  Cardiovascular: See HPI.  Pulmonary:    No cough, no sputum production    Personal History     Social History:    reports that he quit smoking about 42 years ago. His smoking use included cigarettes. He started smoking about 62 years ago. He has a 30.00 pack-year smoking history. He has never used smokeless tobacco. He reports that he does not currently use alcohol. He reports that he does not use drugs.    Home Medications:  Current Outpatient Medications on File Prior to Visit   Medication Sig    alendronate (FOSAMAX) 70 MG tablet Take 1 tablet by mouth Every 7 (Seven) Days.     aspirin 81 MG EC tablet Take 1 tablet by mouth Daily.    atorvastatin (LIPITOR) 10 MG tablet Take 1 tablet by mouth Daily.    Calcium Carbonate-Vitamin D 600-10 MG-MCG per tablet Take 2 tablets by mouth Daily.    cetirizine (zyrTEC) 10 MG tablet Take 1 tablet by mouth Daily.    cyanocobalamin (VITAMIN B-12) 1000 MCG tablet Take 1 tablet by mouth Daily.    Eliquis 5 MG tablet tablet Take 1 tablet by mouth 2 (Two) Times a Day.    finasteride (PROSCAR) 5 MG tablet Take 1 tablet by mouth Daily.    fluticasone (FLONASE) 50 MCG/ACT nasal spray 2 sprays into the nostril(s) as directed by provider Daily.    gabapentin (NEURONTIN) 600 MG tablet Take 1 tablet by mouth 3 (Three) Times a Day.    hydroCHLOROthiazide (HYDRODIURIL) 12.5 MG tablet Take 1 tablet by mouth Daily.    lisinopril (PRINIVIL,ZESTRIL) 20 MG tablet Take 1 tablet by mouth Daily.    metFORMIN (GLUCOPHAGE) 1000 MG tablet Take 1  tablet by mouth 2 (Two) Times a Day With Meals.    metoprolol succinate XL (TOPROL-XL) 25 MG 24 hr tablet Take 1 tablet by mouth 2 (Two) Times a Day.    tamsulosin (FLOMAX) 0.4 MG capsule 24 hr capsule Take 1 capsule by mouth Every Night.    Testosterone 10 MG/ACT (2%) gel Place 40 mg on the skin as directed by provider Daily.     No current facility-administered medications on file prior to visit.       Past Medical History:   Diagnosis Date    Arrhythmia     BPH with urinary obstruction 05/01/2014    Diabetes mellitus     Epididymal mass     Essential hypertension 7/13/2021    Follicular adenoma of thyroid gland 05/19/2021    RIGHT    Hypogonadism, testicular     Mixed hyperlipidemia 7/13/2021    Paroxysmal atrial fibrillation 7/3/2021    Seizure     once per wife butbwas diagnoised with afib       Allergies:  No Known Allergies    Objective    Objective       Vitals:   Heart Rate:  [65] 65  BP: (111)/(58) 111/58  Body mass index is 34.31 kg/mý.     PHYSICAL EXAM:    General Appearance:   well developed  well nourished  HENT:   oropharynx moist  lips not cyanotic  Neck:  thyroid not enlarged  supple  Respiratory:  no respiratory distress  normal breath sounds  no rales  Cardiovascular:  no jugular venous distention  regular rhythm  apical impulse normal  S1 normal, S2 normal  no S3, no S4   no murmur  no rub, no thrill  carotid pulses normal; no bruit  pedal pulses normal  lower extremity edema: none    Skin:   warm, dry  Psychiatric:  judgement and insight appropriate  normal mood and affect        Result Review:  I have personally reviewed the available results from  [x]  Laboratory  [x]  EKG  [x]  Cardiology  [x]  Medications  [x]  Old records  []  Other:     Procedures  Lab Results   Component Value Date    CHOL 126 04/07/2023    CHOL 118 04/18/2022    CHOL 108 08/24/2021     Lab Results   Component Value Date    TRIG 232 (H) 04/07/2023    TRIG 202 (H) 04/18/2022    TRIG 130 08/24/2021     Lab Results    Component Value Date    HDL 28 (L) 04/07/2023    HDL 28 (L) 04/18/2022    HDL 26 (L) 08/24/2021     Lab Results   Component Value Date    LDL 60 04/07/2023    LDL 57 04/18/2022    LDL 59 08/24/2021     Lab Results   Component Value Date    VLDL 38 04/07/2023    VLDL 33 04/18/2022    VLDL 23 08/24/2021     Results for orders placed during the hospital encounter of 07/02/21    Adult Transthoracic Echo Complete W/ Cont if Necessary Per Protocol    Interpretation Summary  1.  Normal left ventricular systolic function.  2.  Fibrocalcific mitral and aortic valves.  3.  Mild tricuspid regurgitation and trace mitral regurgitation.  4.  Trace aortic regurgitation.  5.  Biatrial enlargement noted.     Impression/Plan:  1.  Paroxysmal atrial fibrillation: Continue Eliquis 5 mg twice a day for stroke prevention.  Continue Toprol-XL 25 mg twice a day for rate control.  2.  Hyperlipidemia: Continue Lipitor 10 mg once a day.  Monitor lipid and hepatic profile.  3.  Essential hypertension controlled: Continue lisinopril 20 mg once a day.  Continue Toprol-XL 25 mg twice a day.  Monitor blood pressure regularly.           Brian Muniz MD   08/31/23   11:59 EDT

## 2023-08-31 ENCOUNTER — OFFICE VISIT (OUTPATIENT)
Dept: CARDIOLOGY | Facility: CLINIC | Age: 83
End: 2023-08-31
Payer: MEDICARE

## 2023-08-31 VITALS
SYSTOLIC BLOOD PRESSURE: 111 MMHG | BODY MASS INDEX: 34.17 KG/M2 | DIASTOLIC BLOOD PRESSURE: 58 MMHG | WEIGHT: 212.6 LBS | HEART RATE: 65 BPM | HEIGHT: 66 IN

## 2023-08-31 DIAGNOSIS — I10 HYPERTENSION, ESSENTIAL: ICD-10-CM

## 2023-08-31 DIAGNOSIS — I48.0 PAROXYSMAL ATRIAL FIBRILLATION: Primary | ICD-10-CM

## 2023-08-31 DIAGNOSIS — E78.2 HYPERLIPEMIA, MIXED: ICD-10-CM

## 2023-08-31 PROCEDURE — 99214 OFFICE O/P EST MOD 30 MIN: CPT | Performed by: SPECIALIST

## 2023-08-31 PROCEDURE — 3074F SYST BP LT 130 MM HG: CPT | Performed by: SPECIALIST

## 2023-08-31 PROCEDURE — 3078F DIAST BP <80 MM HG: CPT | Performed by: SPECIALIST

## 2023-08-31 PROCEDURE — 1159F MED LIST DOCD IN RCRD: CPT | Performed by: SPECIALIST

## 2023-08-31 PROCEDURE — 1160F RVW MEDS BY RX/DR IN RCRD: CPT | Performed by: SPECIALIST

## 2023-11-29 RX ORDER — GABAPENTIN 600 MG/1
600 TABLET ORAL 3 TIMES DAILY
Qty: 90 TABLET | Refills: 2 | Status: SHIPPED | OUTPATIENT
Start: 2023-11-29

## 2024-01-08 ENCOUNTER — TELEPHONE (OUTPATIENT)
Dept: FAMILY MEDICINE CLINIC | Facility: CLINIC | Age: 84
End: 2024-01-08
Payer: MEDICARE

## 2024-01-08 DIAGNOSIS — E29.1 HYPOGONADISM IN MALE: ICD-10-CM

## 2024-01-08 RX ORDER — TESTOSTERONE 10 MG/.5G
40 GEL, METERED TOPICAL DAILY
Qty: 60 G | Refills: 2 | Status: SHIPPED | OUTPATIENT
Start: 2024-01-08

## 2024-01-08 NOTE — TELEPHONE ENCOUNTER
Caller: JHON RAMIREZ    Relationship: Emergency Contact    Best call back number: [phone]     Requested Prescriptions:   Requested Prescriptions     Pending Prescriptions Disp Refills    Testosterone 10 MG/ACT (2%) gel 60 g 2     Sig: Place 40 mg on the skin as directed by provider Daily.        Pharmacy where request should be sent: [facility] [address] [phone] [facility] [phone] FX     Last office visit with prescribing clinician: 7/5/2023   Last telemedicine visit with prescribing clinician: Visit date not found   Next office visit with prescribing clinician: 4/8/2024       Does the patient have less than a 3 day supply:  [] Yes  [x] No    Would you like a call back once the refill request has been completed: [] Yes [x] No    If the office needs to give you a call back, can they leave a voicemail: [] Yes [x] No    Jade Esposito, PCT   01/08/24 08:42 EST

## 2024-02-22 ENCOUNTER — TELEPHONE (OUTPATIENT)
Dept: FAMILY MEDICINE CLINIC | Facility: CLINIC | Age: 84
End: 2024-02-22
Payer: MEDICARE

## 2024-02-26 ENCOUNTER — TELEPHONE (OUTPATIENT)
Dept: FAMILY MEDICINE CLINIC | Facility: CLINIC | Age: 84
End: 2024-02-26

## 2024-02-26 NOTE — TELEPHONE ENCOUNTER
Caller: JHON RAMIREZ    Relationship to patient: Emergency Contact    Best call back number: [phone]    Chief complaint: MED REFILLS    Type of visit: OFFICE    Requested date: AFTER 4/10/2024    If rescheduling, when is the original appointment:4/8/2024    Additional notes:PROVIDER NOT AVAILABLE ON 4/8/2024 AND PATIENT WOULD LIKE AN APPOINTMENT IN THE MONTH OF APRIL AFTER 4/10/2024. NO APPOINTMENTS AVAILABLE FOR HUB. PATIENT SAID IT IS OKAY TO LEAVE A MESSAGE.

## 2024-04-09 DIAGNOSIS — E29.1 HYPOGONADISM IN MALE: ICD-10-CM

## 2024-04-09 RX ORDER — GABAPENTIN 600 MG/1
600 TABLET ORAL 3 TIMES DAILY
Qty: 90 TABLET | Refills: 2 | Status: SHIPPED | OUTPATIENT
Start: 2024-04-09

## 2024-04-09 RX ORDER — TESTOSTERONE 10 MG/.5G
40 GEL, METERED TOPICAL DAILY
Qty: 60 G | Refills: 2 | Status: SHIPPED | OUTPATIENT
Start: 2024-04-09

## 2024-04-09 NOTE — TELEPHONE ENCOUNTER
Caller: JHON RAMIREZ    Relationship: Emergency Contact    Best call back number: [phone]     Requested Prescriptions:   Requested Prescriptions     Pending Prescriptions Disp Refills    gabapentin (NEURONTIN) 600 MG tablet 90 tablet 2     Sig: Take 1 tablet by mouth 3 (Three) Times a Day.    Testosterone 10 MG/ACT (2%) gel 60 g 2     Sig: Place 40 mg on the skin as directed by provider Daily.        Pharmacy where request should be sent: [facility] [address] [phone] [facility] [phone] FX     Last office visit with prescribing clinician: 7/5/2023   Last telemedicine visit with prescribing clinician: Visit date not found   Next office visit with prescribing clinician: 4/16/2024     Does the patient have less than a 3 day supply:  [x] Yes  [] No      Jaun López Rep   04/09/24 09:01 EDT

## 2024-04-16 ENCOUNTER — OFFICE VISIT (OUTPATIENT)
Dept: FAMILY MEDICINE CLINIC | Facility: CLINIC | Age: 84
End: 2024-04-16
Payer: MEDICARE

## 2024-04-16 VITALS
BODY MASS INDEX: 34.33 KG/M2 | HEART RATE: 58 BPM | DIASTOLIC BLOOD PRESSURE: 80 MMHG | HEIGHT: 66 IN | RESPIRATION RATE: 18 BRPM | TEMPERATURE: 97.9 F | SYSTOLIC BLOOD PRESSURE: 130 MMHG | WEIGHT: 213.6 LBS | OXYGEN SATURATION: 96 %

## 2024-04-16 DIAGNOSIS — R19.01 RIGHT UPPER QUADRANT ABDOMINAL MASS: ICD-10-CM

## 2024-04-16 DIAGNOSIS — Z51.81 MEDICATION MONITORING ENCOUNTER: ICD-10-CM

## 2024-04-16 DIAGNOSIS — E78.2 MIXED HYPERLIPIDEMIA: ICD-10-CM

## 2024-04-16 DIAGNOSIS — E29.1 HYPOGONADISM IN MALE: ICD-10-CM

## 2024-04-16 DIAGNOSIS — E53.8 B12 DEFICIENCY: ICD-10-CM

## 2024-04-16 DIAGNOSIS — E55.9 VITAMIN D DEFICIENCY: ICD-10-CM

## 2024-04-16 DIAGNOSIS — I48.0 PAROXYSMAL ATRIAL FIBRILLATION: ICD-10-CM

## 2024-04-16 DIAGNOSIS — J30.9 ALLERGIC RHINITIS, UNSPECIFIED SEASONALITY, UNSPECIFIED TRIGGER: ICD-10-CM

## 2024-04-16 DIAGNOSIS — R16.0 LIVER MASS: ICD-10-CM

## 2024-04-16 DIAGNOSIS — N40.0 BENIGN PROSTATIC HYPERPLASIA, UNSPECIFIED WHETHER LOWER URINARY TRACT SYMPTOMS PRESENT: ICD-10-CM

## 2024-04-16 DIAGNOSIS — R73.03 PREDIABETES: ICD-10-CM

## 2024-04-16 DIAGNOSIS — I10 ESSENTIAL HYPERTENSION: Primary | ICD-10-CM

## 2024-04-16 PROCEDURE — 3075F SYST BP GE 130 - 139MM HG: CPT | Performed by: FAMILY MEDICINE

## 2024-04-16 PROCEDURE — 3079F DIAST BP 80-89 MM HG: CPT | Performed by: FAMILY MEDICINE

## 2024-04-16 PROCEDURE — 99214 OFFICE O/P EST MOD 30 MIN: CPT | Performed by: FAMILY MEDICINE

## 2024-04-16 RX ORDER — LISINOPRIL 20 MG/1
20 TABLET ORAL DAILY
Qty: 90 TABLET | Refills: 3 | Status: SHIPPED | OUTPATIENT
Start: 2024-04-16

## 2024-04-16 RX ORDER — CALCIUM CARBONATE/VITAMIN D3 600 MG-10
TABLET ORAL
COMMUNITY
Start: 2024-01-15 | End: 2024-04-16

## 2024-04-16 RX ORDER — ATORVASTATIN CALCIUM 10 MG/1
10 TABLET, FILM COATED ORAL DAILY
Qty: 90 TABLET | Refills: 3 | Status: SHIPPED | OUTPATIENT
Start: 2024-04-16

## 2024-04-16 RX ORDER — ALENDRONATE SODIUM 70 MG/1
70 TABLET ORAL
Qty: 13 TABLET | Refills: 3 | Status: SHIPPED | OUTPATIENT
Start: 2024-04-16

## 2024-04-16 RX ORDER — FLUTICASONE PROPIONATE 50 MCG
2 SPRAY, SUSPENSION (ML) NASAL DAILY
Qty: 48 G | Refills: 3 | Status: SHIPPED | OUTPATIENT
Start: 2024-04-16

## 2024-04-16 RX ORDER — CETIRIZINE HYDROCHLORIDE 10 MG/1
5 TABLET ORAL DAILY
Qty: 45 TABLET | Refills: 3 | Status: SHIPPED | OUTPATIENT
Start: 2024-04-16

## 2024-04-16 RX ORDER — FINASTERIDE 5 MG/1
5 TABLET, FILM COATED ORAL DAILY
Qty: 90 TABLET | Refills: 3 | Status: SHIPPED | OUTPATIENT
Start: 2024-04-16

## 2024-04-16 RX ORDER — MELATONIN
COMMUNITY
Start: 2024-01-15 | End: 2024-04-16

## 2024-04-16 RX ORDER — ASPIRIN 81 MG/1
81 TABLET ORAL DAILY
Qty: 90 TABLET | Refills: 3 | Status: SHIPPED | OUTPATIENT
Start: 2024-04-16

## 2024-04-16 RX ORDER — HYDROCHLOROTHIAZIDE 12.5 MG/1
12.5 TABLET ORAL DAILY
Qty: 90 TABLET | Refills: 3 | Status: SHIPPED | OUTPATIENT
Start: 2024-04-16

## 2024-04-16 RX ORDER — TAMSULOSIN HYDROCHLORIDE 0.4 MG/1
1 CAPSULE ORAL NIGHTLY
Qty: 90 CAPSULE | Refills: 3 | Status: SHIPPED | OUTPATIENT
Start: 2024-04-16

## 2024-04-16 RX ORDER — METOPROLOL SUCCINATE 25 MG/1
25 TABLET, EXTENDED RELEASE ORAL 2 TIMES DAILY
Qty: 180 TABLET | Refills: 3 | Status: SHIPPED | OUTPATIENT
Start: 2024-04-16

## 2024-04-16 RX ORDER — APIXABAN 5 MG/1
5 TABLET, FILM COATED ORAL 2 TIMES DAILY
Qty: 180 TABLET | Refills: 3 | Status: SHIPPED | OUTPATIENT
Start: 2024-04-16

## 2024-04-16 NOTE — PROGRESS NOTES
Chief Complaint  Med refills  Abdominal mass  Liver mass  Low testosterone      Subjective          Steve Bryson Jr. presents to [facility]  History of Present Illness  Patient presents today accompanied by his spouse to be seen again after coming back from [place].  He spends 6 months out of the year in [place].  I last saw him on 7/5/2023.  He does have hypogonadism.  He is currently taking 40 mg of Fortesta daily.  He is not requesting a refill at this time.  His last testosterone level was adequate at 361..  He is due to have his labs repeated again and will return fasting to have these done.  His PSA also was previously within normal limit.  He does have an enlarged prostate and takes finasteride as well as Flomax.  He continues to be on B12 supplementation as well as vitamin D supplementation.  He did see Dr. Brice again who had requested a second opinion in regards to the abdominal mass with previous negative cytology.  This mass measures 17.6 x 13.6 x 19.1 cm.  Second opinion was sought after by Dr. Jerald Salomon at [facility].  Patient had another biopsy done which thankfully showed a negative cytology report showing scar and fat necrosis which was negative for malignancy.  He continues to be asymptomatic but is interested in considering possibly having this removed but would like to see Dr. Brice locally.  He is hopeful to have this done prior to leaving again for [place] in September.  He had a CT that had also showed a 2 cm lesion in the posterior right liver that was indeterminant.  A benign process is favored.  We did discuss getting an MRI for further evaluation.  He never got the MRI completed.  I will place the order again.  It looks like attempts were made to contact him.  Blood pressure has been adequately controlled taking lisinopril 20 mg daily as well as metoprolol succinate 25 mg twice daily and hydrochlorothiazide 12.5 mg daily.  He continues to see  "Dr. Muniz for atrial fibrillation.  He will have an appointment at the end of the month.  He is requesting most of all of his medications to be refilled today.      Current Outpatient Medications   Medication Instructions    alendronate (FOSAMAX) 70 mg, Oral, Every 7 Days, SUNDAY    aspirin 81 mg, Oral, Daily    atorvastatin (LIPITOR) 10 mg, Oral, Daily    Calcium Carbonate-Vitamin D 600-10 MG-MCG per tablet 2 tablets, Oral, Daily    cetirizine (ZYRTEC) 5 mg, Oral, Daily    cyanocobalamin (VITAMIN B-12) 1,000 mcg, Oral, Daily    Eliquis 5 mg, Oral, 2 Times Daily    finasteride (PROSCAR) 5 mg, Oral, Daily    fluticasone (FLONASE) 50 MCG/ACT nasal spray 2 sprays, Nasal, Daily    gabapentin (NEURONTIN) 600 mg, Oral, 3 Times Daily    hydroCHLOROthiazide 12.5 mg, Oral, Daily    lisinopril (PRINIVIL,ZESTRIL) 20 mg, Oral, Daily    metFORMIN (GLUCOPHAGE) 1,000 mg, Oral, 2 Times Daily With Meals    metoprolol succinate XL (TOPROL-XL) 25 mg, Oral, 2 Times Daily    tamsulosin (FLOMAX) 0.4 mg, Oral, Nightly    Testosterone 40 mg, Transdermal, Daily       The following portions of the patient's history were reviewed and updated as appropriate: allergies, current medications, past family history, past medical history, past social history, past surgical history, and problem list.    Objective   Vital Signs:   /80 (BP Location: Left arm, Patient Position: Sitting, Cuff Size: Adult)   Pulse 58   Temp 97.9 °F (36.6 °C) (Oral)   Resp 18   Ht 167.6 cm (66\")   Wt 96.9 kg (213 lb 9.6 oz)   SpO2 96%   BMI 34.48 kg/m²     BP Readings from Last 3 Encounters:   04/16/24 130/80   08/31/23 111/58   07/05/23 128/70     Wt Readings from Last 3 Encounters:   04/16/24 96.9 kg (213 lb 9.6 oz)   08/31/23 96.4 kg (212 lb 9.6 oz)   07/05/23 101 kg (221 lb 11.2 oz)     BMI is >= 30 and <35. (Class 1 Obesity). The following options were offered after discussion;: exercise counseling/recommendations and nutrition " counseling/recommendations     Physical Exam  Vitals reviewed.   Constitutional:       Appearance: Normal appearance.   HENT:      Head: Normocephalic and atraumatic.      Right Ear: External ear normal.      Left Ear: External ear normal.   Eyes:      Conjunctiva/sclera: Conjunctivae normal.   Cardiovascular:      Rate and Rhythm: Normal rate and regular rhythm.      Heart sounds: No murmur heard.     No friction rub. No gallop.   Pulmonary:      Effort: Pulmonary effort is normal.      Breath sounds: Normal breath sounds. No wheezing or rhonchi.   Abdominal:      General: Bowel sounds are normal. There is no distension.      Palpations: Abdomen is soft. There is mass.      Tenderness: There is no abdominal tenderness.      Comments: Abdominal mass noted in the right upper quadrant.   Skin:     General: Skin is warm and dry.   Neurological:      Mental Status: He is alert and oriented to person, place, and time.      Cranial Nerves: No cranial nerve deficit.   Psychiatric:         Mood and Affect: Mood and affect normal.         Behavior: Behavior normal.         Thought Content: Thought content normal.         Judgment: Judgment normal.            Result Review :   The following data was reviewed by: Ok Moore DO on 04/16/2024:  Common labs          7/5/2023    12:15 7/10/2023    08:20   Common Labs   Glucose 97  107    BUN 11  11    Creatinine 0.88  0.98    Sodium 140  143    Potassium 4.3  3.6    Chloride 101  104    Calcium 11.1  10.2    Albumin 4.4  4.2    Total Bilirubin 0.8  0.8    Alkaline Phosphatase 79  74    AST (SGOT) 29  20    ALT (SGPT) 28  23    Hemoglobin A1C 5.90     Microalbumin, Urine <1.2              Lab Results   Component Value Date    COVID19 Not Detected 08/16/2021    INR 1.03 (L) 08/16/2021    BILIRUBINUR Negative 07/02/2021       Procedures        Assessment and Plan    Diagnoses and all orders for this visit:    1. Essential hypertension (Primary)  -     CBC & Differential;  Future  -     Comprehensive Metabolic Panel; Future    2. Medication monitoring encounter  -     Drug Screen 10 With / Conf, WB; Future  -     PSA DIAGNOSTIC; Future    3. Hypogonadism in male  -     Testosterone; Future  -     PSA DIAGNOSTIC; Future    4. Prediabetes  -     Hemoglobin A1c; Future    5. Vitamin D deficiency  -     Vitamin D,25-Hydroxy; Future    6. B12 deficiency  -     Vitamin B12; Future    7. Paroxysmal atrial fibrillation    8. Allergic rhinitis, unspecified seasonality, unspecified trigger    9. Benign prostatic hyperplasia, unspecified whether lower urinary tract symptoms present  -     PSA DIAGNOSTIC; Future    10. Mixed hyperlipidemia  -     Lipid Panel; Future    11. Right upper quadrant abdominal mass  -     Ambulatory Referral to General Surgery    12. Liver mass  -     MRI Abdomen With & Without Contrast; Future    Other orders  -     aspirin 81 MG EC tablet; Take 1 tablet by mouth Daily.  Dispense: 90 tablet; Refill: 3  -     Eliquis 5 MG tablet tablet; Take 1 tablet by mouth 2 (Two) Times a Day.  Dispense: 180 tablet; Refill: 3  -     metFORMIN (GLUCOPHAGE) 1000 MG tablet; Take 1 tablet by mouth 2 (Two) Times a Day With Meals.  Dispense: 180 tablet; Refill: 3  -     cetirizine (zyrTEC) 10 MG tablet; Take 0.5 tablets by mouth Daily.  Dispense: 45 tablet; Refill: 3  -     atorvastatin (LIPITOR) 10 MG tablet; Take 1 tablet by mouth Daily.  Dispense: 90 tablet; Refill: 3  -     lisinopril (PRINIVIL,ZESTRIL) 20 MG tablet; Take 1 tablet by mouth Daily.  Dispense: 90 tablet; Refill: 3  -     metoprolol succinate XL (TOPROL-XL) 25 MG 24 hr tablet; Take 1 tablet by mouth 2 (Two) Times a Day.  Dispense: 180 tablet; Refill: 3  -     hydroCHLOROthiazide 12.5 MG tablet; Take 1 tablet by mouth Daily.  Dispense: 90 tablet; Refill: 3  -     cyanocobalamin (VITAMIN B-12) 1000 MCG tablet; Take 1 tablet by mouth Daily.  Dispense: 90 tablet; Refill: 3  -     Calcium Carbonate-Vitamin D 600-10 MG-MCG per  tablet; Take 2 tablets by mouth Daily.  Dispense: 180 tablet; Refill: 3  -     alendronate (FOSAMAX) 70 MG tablet; Take 1 tablet by mouth Every 7 (Seven) Days. SUNDAY  Dispense: 13 tablet; Refill: 3  -     finasteride (PROSCAR) 5 MG tablet; Take 1 tablet by mouth Daily.  Dispense: 90 tablet; Refill: 3  -     tamsulosin (FLOMAX) 0.4 MG capsule 24 hr capsule; Take 1 capsule by mouth Every Night.  Dispense: 90 capsule; Refill: 3  -     fluticasone (FLONASE) 50 MCG/ACT nasal spray; 2 sprays into the nostril(s) as directed by provider Daily.  Dispense: 48 g; Refill: 3    I discussed with patient referral back to general surgery.  We did discuss getting the MRI of the abdomen completed to reevaluate the liver.  He reports overall doing well at this time.  Medications have been refilled today as requested.  He does need repeat drug testing as well.  He will return to have his labs done at his convenience.  He reports that he will likely get these done by the end of the week.  I plan to see him back in 2 months for close follow-up.  I did discuss with him that he has ongoing medical conditions that require interval addressing.  I have not seen him for 9 months.  He is leaving for [place] for 6 months out of the year.  I discussed with him that he may need to consider his travel plans again in the future as it is difficult to take care of everything that he would like to have done within the short window that he is here.      Medications Discontinued During This Encounter   Medication Reason    calcium carb-cholecalciferol 600-10 MG-MCG tablet per tablet     Cholecalciferol 25 MCG (1000 UT) tablet     aspirin 81 MG EC tablet Reorder    metFORMIN (GLUCOPHAGE) 1000 MG tablet Reorder    cetirizine (zyrTEC) 10 MG tablet Reorder    atorvastatin (LIPITOR) 10 MG tablet Reorder    lisinopril (PRINIVIL,ZESTRIL) 20 MG tablet Reorder    hydroCHLOROthiazide (HYDRODIURIL) 12.5 MG tablet Reorder    cyanocobalamin (VITAMIN B-12) 1000 MCG  tablet Reorder    alendronate (FOSAMAX) 70 MG tablet Reorder    finasteride (PROSCAR) 5 MG tablet Reorder    tamsulosin (FLOMAX) 0.4 MG capsule 24 hr capsule Reorder    fluticasone (FLONASE) 50 MCG/ACT nasal spray Reorder    Eliquis 5 MG tablet tablet Reorder    metoprolol succinate XL (TOPROL-XL) 25 MG 24 hr tablet Reorder    Calcium Carbonate-Vitamin D 600-10 MG-MCG per tablet Reorder          Follow Up   Return in about 2 months (around 6/16/2024) for Hypertension.  Patient was given instructions and counseling regarding his condition or for health maintenance advice. Please see specific information pulled into the AVS if appropriate.       Ok Moore DO  04/16/24  15:01 EDT

## 2024-04-23 ENCOUNTER — CLINICAL SUPPORT (OUTPATIENT)
Dept: FAMILY MEDICINE CLINIC | Facility: CLINIC | Age: 84
End: 2024-04-23
Payer: MEDICARE

## 2024-04-23 DIAGNOSIS — I10 ESSENTIAL HYPERTENSION: ICD-10-CM

## 2024-04-23 DIAGNOSIS — R73.03 PREDIABETES: ICD-10-CM

## 2024-04-23 DIAGNOSIS — E78.2 MIXED HYPERLIPIDEMIA: ICD-10-CM

## 2024-04-23 DIAGNOSIS — E55.9 VITAMIN D DEFICIENCY: ICD-10-CM

## 2024-04-23 DIAGNOSIS — Z51.81 MEDICATION MONITORING ENCOUNTER: ICD-10-CM

## 2024-04-23 DIAGNOSIS — E53.8 B12 DEFICIENCY: ICD-10-CM

## 2024-04-23 DIAGNOSIS — N40.0 BENIGN PROSTATIC HYPERPLASIA, UNSPECIFIED WHETHER LOWER URINARY TRACT SYMPTOMS PRESENT: ICD-10-CM

## 2024-04-23 DIAGNOSIS — E29.1 HYPOGONADISM IN MALE: ICD-10-CM

## 2024-04-23 LAB
25(OH)D3 SERPL-MCNC: 46.2 NG/ML (ref 30–100)
ALBUMIN SERPL-MCNC: 3.9 G/DL (ref 3.5–5.2)
ALBUMIN/GLOB SERPL: 1.4 G/DL
ALP SERPL-CCNC: 69 U/L (ref 39–117)
ALT SERPL W P-5'-P-CCNC: 23 U/L (ref 1–41)
ANION GAP SERPL CALCULATED.3IONS-SCNC: 9 MMOL/L (ref 5–15)
AST SERPL-CCNC: 18 U/L (ref 1–40)
BASOPHILS # BLD AUTO: 0.09 10*3/MM3 (ref 0–0.2)
BASOPHILS NFR BLD AUTO: 1.2 % (ref 0–1.5)
BILIRUB SERPL-MCNC: 0.6 MG/DL (ref 0–1.2)
BUN SERPL-MCNC: 15 MG/DL (ref 8–23)
BUN/CREAT SERPL: 12.7 (ref 7–25)
CALCIUM SPEC-SCNC: 10.3 MG/DL (ref 8.6–10.5)
CHLORIDE SERPL-SCNC: 105 MMOL/L (ref 98–107)
CHOLEST SERPL-MCNC: 116 MG/DL (ref 0–200)
CO2 SERPL-SCNC: 27 MMOL/L (ref 22–29)
CREAT SERPL-MCNC: 1.18 MG/DL (ref 0.76–1.27)
DEPRECATED RDW RBC AUTO: 44.8 FL (ref 37–54)
EGFRCR SERPLBLD CKD-EPI 2021: 62 ML/MIN/1.73
EOSINOPHIL # BLD AUTO: 0.53 10*3/MM3 (ref 0–0.4)
EOSINOPHIL NFR BLD AUTO: 7 % (ref 0.3–6.2)
ERYTHROCYTE [DISTWIDTH] IN BLOOD BY AUTOMATED COUNT: 13 % (ref 12.3–15.4)
GLOBULIN UR ELPH-MCNC: 2.8 GM/DL
GLUCOSE SERPL-MCNC: 114 MG/DL (ref 65–99)
HBA1C MFR BLD: 5.8 % (ref 4.8–5.6)
HCT VFR BLD AUTO: 43.5 % (ref 37.5–51)
HDLC SERPL-MCNC: 27 MG/DL (ref 40–60)
HGB BLD-MCNC: 15 G/DL (ref 13–17.7)
IMM GRANULOCYTES # BLD AUTO: 0.01 10*3/MM3 (ref 0–0.05)
IMM GRANULOCYTES NFR BLD AUTO: 0.1 % (ref 0–0.5)
LDLC SERPL CALC-MCNC: 51 MG/DL (ref 0–100)
LDLC/HDLC SERPL: 1.52 {RATIO}
LYMPHOCYTES # BLD AUTO: 1.94 10*3/MM3 (ref 0.7–3.1)
LYMPHOCYTES NFR BLD AUTO: 25.8 % (ref 19.6–45.3)
MCH RBC QN AUTO: 32.7 PG (ref 26.6–33)
MCHC RBC AUTO-ENTMCNC: 34.5 G/DL (ref 31.5–35.7)
MCV RBC AUTO: 94.8 FL (ref 79–97)
MONOCYTES # BLD AUTO: 0.57 10*3/MM3 (ref 0.1–0.9)
MONOCYTES NFR BLD AUTO: 7.6 % (ref 5–12)
NEUTROPHILS NFR BLD AUTO: 4.39 10*3/MM3 (ref 1.7–7)
NEUTROPHILS NFR BLD AUTO: 58.3 % (ref 42.7–76)
NRBC BLD AUTO-RTO: 0 /100 WBC (ref 0–0.2)
PLATELET # BLD AUTO: 208 10*3/MM3 (ref 140–450)
PMV BLD AUTO: 11.5 FL (ref 6–12)
POTASSIUM SERPL-SCNC: 4.6 MMOL/L (ref 3.5–5.2)
PROT SERPL-MCNC: 6.7 G/DL (ref 6–8.5)
PSA SERPL-MCNC: 2.51 NG/ML (ref 0–4)
RBC # BLD AUTO: 4.59 10*6/MM3 (ref 4.14–5.8)
SODIUM SERPL-SCNC: 141 MMOL/L (ref 136–145)
TESTOST SERPL-MCNC: 526 NG/DL (ref 193–740)
TRIGL SERPL-MCNC: 240 MG/DL (ref 0–150)
VIT B12 BLD-MCNC: 765 PG/ML (ref 211–946)
VLDLC SERPL-MCNC: 38 MG/DL (ref 5–40)
WBC NRBC COR # BLD AUTO: 7.53 10*3/MM3 (ref 3.4–10.8)

## 2024-04-23 PROCEDURE — 82306 VITAMIN D 25 HYDROXY: CPT | Performed by: FAMILY MEDICINE

## 2024-04-23 PROCEDURE — 80061 LIPID PANEL: CPT | Performed by: FAMILY MEDICINE

## 2024-04-23 PROCEDURE — 84153 ASSAY OF PSA TOTAL: CPT | Performed by: FAMILY MEDICINE

## 2024-04-23 PROCEDURE — 80307 DRUG TEST PRSMV CHEM ANLYZR: CPT | Performed by: FAMILY MEDICINE

## 2024-04-23 PROCEDURE — 36415 COLL VENOUS BLD VENIPUNCTURE: CPT | Performed by: FAMILY MEDICINE

## 2024-04-23 PROCEDURE — 83036 HEMOGLOBIN GLYCOSYLATED A1C: CPT | Performed by: FAMILY MEDICINE

## 2024-04-23 PROCEDURE — 82607 VITAMIN B-12: CPT | Performed by: FAMILY MEDICINE

## 2024-04-23 PROCEDURE — 84403 ASSAY OF TOTAL TESTOSTERONE: CPT | Performed by: FAMILY MEDICINE

## 2024-04-23 PROCEDURE — 80053 COMPREHEN METABOLIC PANEL: CPT | Performed by: FAMILY MEDICINE

## 2024-04-23 PROCEDURE — 85025 COMPLETE CBC W/AUTO DIFF WBC: CPT | Performed by: FAMILY MEDICINE

## 2024-04-25 LAB
AMPHETAMINES BLD QL SCN: NEGATIVE NG/ML
BARBITURATES SERPLBLD QL: NEGATIVE UG/ML
BENZODIAZ BLD QL: NEGATIVE NG/ML
CANNABINOIDS BLD QL SCN: NEGATIVE NG/ML
COCAINE+BZE SERPLBLD QL SCN: NEGATIVE NG/ML
METHADONE BLD QL SCN: NEGATIVE NG/ML
OPIATES BLD QL SCN: NEGATIVE NG/ML
OXYCODONE+OXYMORPHONE SERPLBLD QL SCN: NEGATIVE NG/ML
PCP BLD QL SCN: NEGATIVE NG/ML
PROPOXYPH BLD QL SCN: NEGATIVE NG/ML

## 2024-04-29 NOTE — PROGRESS NOTES
[facility]  Cardiology progress Note    Patient Name: Steve Bryson Jr.  : 1940    CHIEF COMPLAINT  Atrial fibrillation        Subjective   Subjective     HISTORY OF PRESENT ILLNESS    Steve Bryson Jr. is a 83 y.o. male with history of atrial fibrillation.  No further palpitations.    REVIEW OF SYSTEMS    Constitutional:    No fever, no weight loss  Skin:     No rash  Otolaryngeal:    No difficulty swallowing  Cardiovascular: See HPI.  Pulmonary:    No cough, no sputum production    Personal History     Social History:    reports that he quit smoking about 43 years ago. His smoking use included cigarettes. He started smoking about 63 years ago. He has a 40 pack-year smoking history. He has been exposed to tobacco smoke. He has never used smokeless tobacco. He reports that he does not currently use alcohol. He reports that he does not use drugs.    Home Medications:  Current Outpatient Medications on File Prior to Visit   Medication Sig    alendronate (FOSAMAX) 70 MG tablet Take 1 tablet by mouth Every 7 (Seven) Days.     aspirin 81 MG EC tablet Take 1 tablet by mouth Daily.    atorvastatin (LIPITOR) 10 MG tablet Take 1 tablet by mouth Daily.    Calcium Carbonate-Vitamin D 600-10 MG-MCG per tablet Take 2 tablets by mouth Daily.    cetirizine (zyrTEC) 10 MG tablet Take 0.5 tablets by mouth Daily.    cyanocobalamin (VITAMIN B-12) 1000 MCG tablet Take 1 tablet by mouth Daily.    Eliquis 5 MG tablet tablet Take 1 tablet by mouth 2 (Two) Times a Day.    finasteride (PROSCAR) 5 MG tablet Take 1 tablet by mouth Daily.    fluticasone (FLONASE) 50 MCG/ACT nasal spray 2 sprays into the nostril(s) as directed by provider Daily.    gabapentin (NEURONTIN) 600 MG tablet Take 1 tablet by mouth 3 (Three) Times a Day.    hydroCHLOROthiazide 12.5 MG tablet Take 1 tablet by mouth Daily.    lisinopril (PRINIVIL,ZESTRIL) 20 MG tablet Take 1 tablet by mouth Daily.    metFORMIN (GLUCOPHAGE) 1000 MG  tablet Take 1 tablet by mouth 2 (Two) Times a Day With Meals.    metoprolol succinate XL (TOPROL-XL) 25 MG 24 hr tablet Take 1 tablet by mouth 2 (Two) Times a Day.    tamsulosin (FLOMAX) 0.4 MG capsule 24 hr capsule Take 1 capsule by mouth Every Night.    Testosterone 10 MG/ACT (2%) gel Place 40 mg on the skin as directed by provider Daily.     No current facility-administered medications on file prior to visit.       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     PHYSICAL EXAM:    General Appearance:   well developed  well nourished  HENT:   oropharynx moist  lips not cyanotic  Neck:  thyroid not enlarged  supple  Respiratory:  no respiratory distress  normal breath sounds  no rales  Cardiovascular:  no jugular venous distention  regular rhythm  apical impulse normal  S1 normal, S2 normal  no S3, no S4   no murmur  no rub, no thrill  carotid pulses normal; no bruit  pedal pulses normal  lower extremity edema: none    Skin:   warm, dry  Psychiatric:  judgement and insight appropriate  normal mood and affect        Result Review:  I have personally reviewed the available results from  [x]  Laboratory  [x]  EKG  [x]  Cardiology  [x]  Medications  [x]  Old records  []  Other:     Procedures  Lab Results   Component Value Date    CHOL 116 04/23/2024    CHOL 126 04/07/2023    CHOL 118 04/18/2022     Lab Results   Component Value Date    TRIG 240 (H) 04/23/2024    TRIG 232 (H) 04/07/2023    TRIG 202 (H) 04/18/2022      Lab Results   Component Value Date    HDL 27 (L) 04/23/2024    HDL 28 (L) 04/07/2023    HDL 28 (L) 04/18/2022     Lab Results   Component Value Date    LDL 51 04/23/2024    LDL 60 04/07/2023    LDL 57 04/18/2022     Lab Results   Component Value Date    VLDL 38 04/23/2024    VLDL 38 04/07/2023    VLDL 33 04/18/2022     Results for orders placed during the hospital encounter of 07/02/21    Adult Transthoracic Echo Complete W/ Cont if Necessary Per Protocol    Interpretation Summary  1.  Normal left ventricular systolic function.  2.  Fibrocalcific mitral and aortic valves.  3.  Mild tricuspid regurgitation and trace mitral regurgitation.  4.  Trace aortic regurgitation.  5.  Biatrial enlargement noted.     Impression/Plan:  1.  Essential hypertension controlled: Continue lisinopril 20 mg once a day.  Continue Toprol-XL 25 mg twice a day.  Monitor blood pressure regularly.  2.  Mixed hyperlipidemia: Continue Lipitor 10 mg once a day.  Monitor lipid and hepatic profile.  3.  Paroxysmal atrial fibrillation: Continue Eliquis 5 mg twice a day for stroke prevention.  Continue Toprol-XL 25 mg twice a day for rate control.           Brian Muniz MD   04/30/24   14:41 EDT

## 2024-04-30 ENCOUNTER — OFFICE VISIT (OUTPATIENT)
Dept: CARDIOLOGY | Facility: CLINIC | Age: 84
End: 2024-04-30
Payer: MEDICARE

## 2024-04-30 VITALS
BODY MASS INDEX: 34.87 KG/M2 | HEART RATE: 50 BPM | HEIGHT: 66 IN | DIASTOLIC BLOOD PRESSURE: 56 MMHG | WEIGHT: 217 LBS | SYSTOLIC BLOOD PRESSURE: 114 MMHG

## 2024-04-30 DIAGNOSIS — E78.2 HYPERLIPEMIA, MIXED: ICD-10-CM

## 2024-04-30 DIAGNOSIS — I10 HYPERTENSION, ESSENTIAL: ICD-10-CM

## 2024-04-30 DIAGNOSIS — I48.0 PAROXYSMAL ATRIAL FIBRILLATION: Primary | ICD-10-CM

## 2024-04-30 PROCEDURE — 1159F MED LIST DOCD IN RCRD: CPT | Performed by: SPECIALIST

## 2024-04-30 PROCEDURE — 3078F DIAST BP <80 MM HG: CPT | Performed by: SPECIALIST

## 2024-04-30 PROCEDURE — 1160F RVW MEDS BY RX/DR IN RCRD: CPT | Performed by: SPECIALIST

## 2024-04-30 PROCEDURE — 99214 OFFICE O/P EST MOD 30 MIN: CPT | Performed by: SPECIALIST

## 2024-04-30 PROCEDURE — 3074F SYST BP LT 130 MM HG: CPT | Performed by: SPECIALIST

## 2024-05-06 ENCOUNTER — OFFICE VISIT (OUTPATIENT)
Dept: SURGERY | Facility: CLINIC | Age: 84
End: 2024-05-06
Payer: MEDICARE

## 2024-05-06 VITALS
DIASTOLIC BLOOD PRESSURE: 64 MMHG | HEIGHT: 66 IN | SYSTOLIC BLOOD PRESSURE: 125 MMHG | WEIGHT: 219 LBS | BODY MASS INDEX: 35.2 KG/M2

## 2024-05-06 DIAGNOSIS — D17.5 LIPOMA OF INTRA-ABDOMINAL ORGAN: Primary | ICD-10-CM

## 2024-05-06 DIAGNOSIS — K76.9 LIVER LESION: ICD-10-CM

## 2024-05-06 PROCEDURE — 3074F SYST BP LT 130 MM HG: CPT | Performed by: SURGERY

## 2024-05-06 PROCEDURE — 3078F DIAST BP <80 MM HG: CPT | Performed by: SURGERY

## 2024-05-06 PROCEDURE — 99213 OFFICE O/P EST LOW 20 MIN: CPT | Performed by: SURGERY

## 2024-05-06 PROCEDURE — 1160F RVW MEDS BY RX/DR IN RCRD: CPT | Performed by: SURGERY

## 2024-05-06 PROCEDURE — 1159F MED LIST DOCD IN RCRD: CPT | Performed by: SURGERY

## 2024-05-15 ENCOUNTER — HOSPITAL ENCOUNTER (OUTPATIENT)
Dept: MRI IMAGING | Facility: HOSPITAL | Age: 84
Discharge: HOME | End: 2024-05-15
Admitting: FAMILY MEDICINE
Payer: MEDICARE

## 2024-05-15 DIAGNOSIS — R16.0 LIVER MASS: ICD-10-CM

## 2024-05-15 PROCEDURE — 74183 MRI ABD W/O CNTR FLWD CNTR: CPT

## 2024-05-15 PROCEDURE — 0 GADOBENATE DIMEGLUMINE 529 MG/ML SOLUTION: Performed by: FAMILY MEDICINE

## 2024-05-15 PROCEDURE — A9577 INJ MULTIHANCE: HCPCS | Performed by: FAMILY MEDICINE

## 2024-05-15 RX ADMIN — GADOBENATE DIMEGLUMINE 20 ML: 529 INJECTION, SOLUTION INTRAVENOUS at 15:22

## 2024-06-07 ENCOUNTER — OFFICE VISIT (OUTPATIENT)
Dept: SURGERY | Facility: CLINIC | Age: 84
End: 2024-06-07
Payer: MEDICARE

## 2024-06-07 VITALS — BODY MASS INDEX: 33.91 KG/M2 | WEIGHT: 211 LBS | HEIGHT: 66 IN

## 2024-06-07 DIAGNOSIS — D17.5 LIPOMA OF INTRA-ABDOMINAL ORGAN: Primary | ICD-10-CM

## 2024-06-07 NOTE — PROGRESS NOTES
Chief Complaint:  Follow-up    Primary Care Provider: Ok Moore DO    History of Present Illness  Steve Bryson Jr. is a 83 y.o. male to see me again for his abdominal wall mass.  I last saw him on 5/6/2024.  A copy of paste of the HPI and the plan section from that office visit is available below.  MRI Abd was done on 5/14/24.  Following is a copy of the impression from the radiology report:  Impression: 1. 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 No internal enhancement. Correlate with prior biopsy results.  2. Cholelithiasis, bilateral simple renal cysts, and additional chronic findings above.    Patient has no complaints or concerns.  We talked about surgery to remove the mass but the patient is adamant he not have surgery.    Copy & Paste of HPI from Office Visit on 5/6/24  Steve Bryson Jr. is a 83 y.o. male referred by Ok Moore DO for another follow-up with his known abdominal mass.Patient was last seen by me on 5/1/23.  A copy of the HPI and the A/P from that office visit is available below.  She was seen by Dr. Shaffer in [place] and repeat biopsy was performed so that MDM2 could be assessed as that assessment was not included on the percutaneous biopsy performed at [facility].  Repeat biopsy was performed and MDM2 was included in the analysis and the findings were consistent with lipoma.  Patient continues not to have any abdominal pain any problems.  The reason he scheduled an appointment to see me was because he is getting an MRI to evaluate a lesion seen in his right liver on prior CT scans.  He thought the liver finding was new and had something to do with his abdominal wall mass.  I reviewed the records and the a for mentioned right liver lesion has been present for over 2 or 3 years and seen on imaging and has benign imaging characteristics but MRI was recommended to  "better evaluate.  I explained this to the patient and he was less concerned about the abdominal wall mass and wanting to have surgery for it.  Patient is scheduled to get the MRI sometime soon.  He has no complaints today.     Copy & Paste of Plan from Office Visit on 5/6/24  Patient will get the MRI that has already been ordered by Dr. Moore.  Patient will see me a few weeks afterwards to discuss the findings and decide together whether any further treatment or evaluation is needed.     Copy & Paste of HPI from Office Visit on 5/1/23  Steve Byrson Jr. is a 82 y.o. male here for scheduled follow-up for an intra-abdominal mass.  A copy and paste of the HPI and A/P from my last office visit with the patient on 5/10/22 is available below.  Patient continues to do fine.  He has no abdominal pain.  He can eat fine.  Normal bowel function.  He just returned from [place] where he leaves go with his wife every September to stay for the winter.  He does not want to have any surgery done unless absolutely necessary.  CT a/p was done on 4/24/23 and showed \"large encapsulated fat density mass in the right abdomen measures up to about 19 centimeters similar to prior CT from 8/4/2021.  Calcification and fat necrosis seen without new enhancing tissue.  This causes mass effect and is similar to previous CT and MRI.\"  The original CT scan when the mass was first seen was done in Aug 2021.  MRI was done after I last saw the patient on 5/10/22 and it showed \"findings are most compatible with a nonaggressive lipomatous lesion.\"  CBC and CMP on 4/7/23 were essentially normal.     Copy & Paste of A/P from Office Visit on 5/1/23  Assessment:  Intra-abdominal mass - no concerning change with radiographic appearance on interval CT scan, prior CT guided biopsy with benign pathology, prior MRI more consistent with nonmalignant than malignant process, no GI symptoms reported by patient  Plan:  Will continue watchful observation.  " "However, will refer for a second opinion from a surgeon at [facility] or [facility] who likely has more experience than I do evaluating an intra-abdominal mass of this nature.  Will set up a referral for the patient.     Copy & Paste of HPI from Office Visit on 5/10/22  History of Present Illness   Patient is here for a 1 year follow-up after an intra-abdominal mass was found incidentally on a CT abdomen pelvis.  A copy and paste of the background information from my initial office visit on 8/6/2021 is available below.  A copy of the plan from that office visit is also very available below.  CT-guided biopsy was done and pathology showed only benign fibroadipose tissue and a copy of the office note from 8/27/2021 about that is also available below.  Patient continues to do very well.  He has no problems at all eating food.  He does not have any abdominal pain, constipation, or GI symptoms.  A follow-up CT scan was done on 4/28/22 (prior CT scan was done on 8/4/21) and it showed \"no significant interval change in appearance of the large, thinly capsulated predominantly fatty density mass in the right abdomen containing coarse central calcification.  Differential diagnosis again includes a large lipoma with central fat necrosis.  However, liposarcoma is also in the differential diagnosis.  Further evaluation by MRI with and without contrast may be of value to better evaluate for enhancement/vascularity.\"  I reviewed the CT scan images myself.  Patient does not want to have any surgery.     Copy and Paste of Note from 8/27/21 Office Visit  Patient was previously seen by me on 8/6/2021 for an intra-abdominal mass.  See that office note for details.  I decided together with the patient to perform a CT-guided biopsy.  The biopsy was done and showed benign fibroadipose tissue.  The chance for sampling area was discussed with the patient.  Although the mass has benign imaging characteristics there " is a possibility for malignancy.  This was discussed with the patient.  The mass seems to be completely asymptomatic.  The patient says he is going to [place] for the winter and is not going to change his plans.  He leaves in a week or twp.  The patient will return to [place] sometime in April 2022.  I will order a CT abdomen pelvis to be done sometime in May 2022.  If the CT scan does not show any concerning changes, and the patient still does not have any symptoms then we will likely [facility].  Further decision making sometime next year after have follow-up CT scan result.     Copy and Paste of HPI from 8/6/21 Office Visit  Steve Bryson Jr. is a 81 y.o. male referred by No ref. provider found for an intra-abdominal mass found incidentally on a CT abdomen pelvis. CT abdomen pelvis was done because the patient has 2 lumbar compression fractures. Copy and paste of the radiology report will be available below. I reviewed the CT scan images myself. 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 Patient denies any abdominal pain. Does not have any abdominal symptoms. Patient last had a colonoscopy 8 to 10 years ago. Patient is leaving to go to [place] for the winter sometime in the first week or two in September.     Copy and Paste of Plan from 8/6/21 Office Visit  CT guided biopsy. I will call the patient on the telephone once I have the biopsy result.     Copy and Paste of A/P from Office Visit on 5/10/22  Assessment:  Intra-abdominal mass - no concerning change in radiographic appearance on interval CT scan, prior CT guided biopsy with benign pathology, no GI symptoms reported by patient, and patient with strong desire to avoid surgery unless absolutely necessary.     Plan:  Will evaluate with MRI.  If MRI result is more consistent with non cancerous  process then will proceed with watchful observation and have patient see me again in one year.  I will call patient on the telephone once I have the MRI result.    Allergies: Patient has no known allergies.    Outpatient Medications Marked as Taking for the 6/7/24 encounter (Office Visit) with Yousif Brice MD   Medication Sig Dispense Refill    alendronate (FOSAMAX) 70 MG tablet Take 1 tablet by mouth Every 7 (Seven) Days. SUNDAY 13 tablet 3    aspirin 81 MG EC tablet Take 1 tablet by mouth Daily. 90 tablet 3    atorvastatin (LIPITOR) 10 MG tablet Take 1 tablet by mouth Daily. 90 tablet 3    cetirizine (zyrTEC) 10 MG tablet Take 0.5 tablets by mouth Daily. 45 tablet 3    cyanocobalamin (VITAMIN B-12) 1000 MCG tablet Take 1 tablet by mouth Daily. 90 tablet 3    Eliquis 5 MG tablet tablet Take 1 tablet by mouth 2 (Two) Times a Day. 180 tablet 3    finasteride (PROSCAR) 5 MG tablet Take 1 tablet by mouth Daily. 90 tablet 3    fluticasone (FLONASE) 50 MCG/ACT nasal spray 2 sprays into the nostril(s) as directed by provider Daily. 48 g 3    gabapentin (NEURONTIN) 600 MG tablet Take 1 tablet by mouth 3 (Three) Times a Day. 90 tablet 2    hydroCHLOROthiazide 12.5 MG tablet Take 1 tablet by mouth Daily. 90 tablet 3    lisinopril (PRINIVIL,ZESTRIL) 20 MG tablet Take 1 tablet by mouth Daily. 90 tablet 3    metFORMIN (GLUCOPHAGE) 1000 MG tablet Take 1 tablet by mouth 2 (Two) Times a Day With Meals. 180 tablet 3    metoprolol succinate XL (TOPROL-XL) 25 MG 24 hr tablet Take 1 tablet by mouth 2 (Two) Times a Day. 180 tablet 3    tamsulosin (FLOMAX) 0.4 MG capsule 24 hr capsule Take 1 capsule by mouth Every Night. 90 capsule 3    Testosterone 10 MG/ACT (2%) gel Place 40 mg on the skin as directed by provider Daily. 60 g 2       Past Medical History:    Arrhythmia    BPH with urinary obstruction    Diabetes mellitus    Epididymal mass    Essential hypertension    Follicular adenoma of thyroid gland    RIGHT    Hypogonadism,  "testicular    Mixed hyperlipidemia    Paroxysmal atrial fibrillation    Seizure    once per wife butbwas diagnoised with afib        Past Surgical History:    COLONOSCOPY    15 YEARS AGO     MOLE REMOVAL    14-HAS NOT REC'D PATHOLOGY RESULTS YET    THYROIDECTOMY, PARTIAL    TOTAL KNEE ARTHROPLASTY       Family History:   Family History   Problem Relation Age of Onset    Clotting disorder Mother     Hypertension Mother     Emphysema Father         Social History:  Social History     Tobacco Use    Smoking status: Former     Current packs/day: 0.00     Average packs/day: 2.0 packs/day for 20.0 years (40.0 ttl pk-yrs)     Types: Cigarettes     Start date:      Quit date:      Years since quittin.4     Passive exposure: Past    Smokeless tobacco: Never    Tobacco comments:     14-QUIT 25-30 YEARS AGO   Substance Use Topics    Alcohol use: Not Currently       Objective     Vital Signs:  Ht 167.6 cm (65.98\")   Wt 95.7 kg (211 lb)   BMI 34.07 kg/m²   Respiratory:  breathing not labored, respiratory effort appears normal  Cardiovascular:  heart regular rate  Abdomen:  soft, nontender, protuberant  Musculoskeletal: moving all extremities symmetrically and purposefully  Neurologic:  no obvious motor or sensory deficits, speech clear  Psychiatric:  judgment and insight intact      Assessment:  Diagnoses and all orders for this visit:    1. Lipoma of intra-abdominal organ (Primary)        Plan:  Option for surgery discussed.  Patient is adamant he not have surgery.  Since all evaluation thus far has been consistent with a benign process, will support patient's decision.  He will see me PRN.      Yousif Brice MD  2024    Electronically signed by Yousif Brice MD, 24, 9:18 AM EDT.      "

## 2024-06-18 ENCOUNTER — OFFICE VISIT (OUTPATIENT)
Dept: FAMILY MEDICINE CLINIC | Facility: CLINIC | Age: 84
End: 2024-06-18
Payer: MEDICARE

## 2024-06-18 VITALS
SYSTOLIC BLOOD PRESSURE: 120 MMHG | DIASTOLIC BLOOD PRESSURE: 58 MMHG | HEART RATE: 55 BPM | WEIGHT: 213.9 LBS | TEMPERATURE: 97.7 F | BODY MASS INDEX: 34.38 KG/M2 | HEIGHT: 66 IN | OXYGEN SATURATION: 99 %

## 2024-06-18 DIAGNOSIS — R19.01 RIGHT UPPER QUADRANT ABDOMINAL MASS: ICD-10-CM

## 2024-06-18 DIAGNOSIS — K76.89 LIVER CYST: ICD-10-CM

## 2024-06-18 DIAGNOSIS — I10 ESSENTIAL HYPERTENSION: Primary | ICD-10-CM

## 2024-06-18 DIAGNOSIS — R73.03 PREDIABETES: ICD-10-CM

## 2024-06-18 DIAGNOSIS — I48.0 PAROXYSMAL ATRIAL FIBRILLATION: ICD-10-CM

## 2024-06-18 DIAGNOSIS — E29.1 HYPOGONADISM IN MALE: ICD-10-CM

## 2024-06-18 DIAGNOSIS — Z51.81 MEDICATION MONITORING ENCOUNTER: ICD-10-CM

## 2024-06-18 DIAGNOSIS — K80.20 CALCULUS OF GALLBLADDER WITHOUT CHOLECYSTITIS WITHOUT OBSTRUCTION: ICD-10-CM

## 2024-06-18 DIAGNOSIS — D17.5 LIPOMA OF INTRA-ABDOMINAL ORGAN: ICD-10-CM

## 2024-06-18 PROCEDURE — 1126F AMNT PAIN NOTED NONE PRSNT: CPT | Performed by: FAMILY MEDICINE

## 2024-06-18 PROCEDURE — 99214 OFFICE O/P EST MOD 30 MIN: CPT | Performed by: FAMILY MEDICINE

## 2024-06-18 PROCEDURE — 3078F DIAST BP <80 MM HG: CPT | Performed by: FAMILY MEDICINE

## 2024-06-18 PROCEDURE — 3074F SYST BP LT 130 MM HG: CPT | Performed by: FAMILY MEDICINE

## 2024-06-18 NOTE — PROGRESS NOTES
Chief Complaint  Hypertension  Intraabdominal mass      Subjective          Steve Gadiel Bryson  presents to [facility]  Hypertension      Patient presents today to follow-up for the intra-abdominal mass.  He did see Dr. Brice on 6/7/2024.  Patient elected to hold off on surgery.  Evaluation is thus far been consistent with a benign process.  Biopsy report had shown scant benign fibroadipose tissue he previously saw Dr. Jerald Salomon.  He had a negative cytology report showing scar and fat necrosis which was negative for malignancy.  Patient is asymptomatic with this lipomatous lesion.  He had an MRI of the abdomen done on 5/15/2024 showing an encapsulated 19 cm fat density lesion in the right mid abdomen unchanged with areas of central calcified fat necrosis likely little a large lipoma.  This is per report.  Given that he is asymptomatic and would like to avoid surgery we discussed holding off on pursuing further surgical options at this time.  If he does starting issues with abdominal pain he is instructed to let us know.  He will see general surgery back as needed.  He does have cholelithiasis noted.  He is asymptomatic from the standpoint so we did discuss monitoring as well.  Blood pressure has been adequately controlled taking lisinopril 20 mg daily as well as metoprolol succinate 25 mg twice daily and hydrochlorothiazide 12.5 mg daily.  He also had a liver cyst noted on the MRI of the abdomen.  No concerning features described.  No further evaluation recommended at this time.  He continues to see cardiology for hypertension as well as paroxysmal atrial fibrillation.  He is anticoagulated with Eliquis.      Current Outpatient Medications   Medication Instructions    alendronate (FOSAMAX) 70 mg, Oral, Every 7 Days, SUNDAY    aspirin 81 mg, Oral, Daily    atorvastatin (LIPITOR) 10 mg, Oral, Daily    Calcium Carbonate-Vitamin D 600-10 MG-MCG per tablet 2 tablets, Oral,  "Daily    cetirizine (ZYRTEC) 5 mg, Oral, Daily    cyanocobalamin (VITAMIN B-12) 1,000 mcg, Oral, Daily    Eliquis 5 mg, Oral, 2 Times Daily    finasteride (PROSCAR) 5 mg, Oral, Daily    fluticasone (FLONASE) 50 MCG/ACT nasal spray 2 sprays, Nasal, Daily    gabapentin (NEURONTIN) 600 mg, Oral, 3 Times Daily    hydroCHLOROthiazide 12.5 mg, Oral, Daily    lisinopril (PRINIVIL,ZESTRIL) 20 mg, Oral, Daily    metFORMIN (GLUCOPHAGE) 1,000 mg, Oral, 2 Times Daily With Meals    metoprolol succinate XL (TOPROL-XL) 25 mg, Oral, 2 Times Daily    raNITIdine HCl (ZANTAC 150 MAXIMUM STRENGTH PO) 1 tablet, Oral, 2 Times Daily    tamsulosin (FLOMAX) 0.4 mg, Oral, Nightly    Testosterone 40 mg, Transdermal, Daily       The following portions of the patient's history were reviewed and updated as appropriate: allergies, current medications, past family history, past medical history, past social history, past surgical history, and problem list.    Objective   Vital Signs:   /58   Pulse 55   Temp 97.7 °F (36.5 °C)   Ht 167.6 cm (65.98\")   Wt 97 kg (213 lb 14.4 oz)   SpO2 99%   BMI 34.55 kg/m²     BP Readings from Last 3 Encounters:   06/18/24 120/58   05/06/24 125/64   04/30/24 114/56     Wt Readings from Last 3 Encounters:   06/18/24 97 kg (213 lb 14.4 oz)   06/07/24 95.7 kg (211 lb)   05/06/24 99.3 kg (219 lb)           Physical Exam  Vitals reviewed.   Constitutional:       Appearance: Normal appearance.   HENT:      Head: Normocephalic and atraumatic.      Right Ear: External ear normal.      Left Ear: External ear normal.   Eyes:      Conjunctiva/sclera: Conjunctivae normal.   Cardiovascular:      Rate and Rhythm: Normal rate and regular rhythm.      Heart sounds: No murmur heard.     No friction rub. No gallop.   Pulmonary:      Effort: Pulmonary effort is normal.      Breath sounds: Normal breath sounds. No wheezing or rhonchi.   Abdominal:      General: Bowel sounds are normal. There is no distension.      Palpations: " Abdomen is soft.      Tenderness: There is no abdominal tenderness.   Skin:     General: Skin is warm and dry.   Neurological:      Mental Status: He is alert and oriented to person, place, and time.      Cranial Nerves: No cranial nerve deficit.   Psychiatric:         Mood and Affect: Mood and affect normal.         Behavior: Behavior normal.         Thought Content: Thought content normal.         Judgment: Judgment normal.            Result Review :   The following data was reviewed by: Ok Moore DO on 06/18/2024:  Common labs          7/5/2023    12:15 7/10/2023    08:20 4/23/2024    09:00 4/23/2024    11:29   Common Labs   Glucose 97  107  114     BUN 11  11  15     Creatinine 0.88  0.98  1.18     Sodium 140  143  141     Potassium 4.3  3.6  4.6     Chloride 101  104  105     Calcium 11.1  10.2  10.3     Albumin 4.4  4.2  3.9     Total Bilirubin 0.8  0.8  0.6     Alkaline Phosphatase 79  74  69     AST (SGOT) 29  20  18     ALT (SGPT) 28  23  23     WBC    7.53    Hemoglobin    15.0    Hematocrit    43.5    Platelets    208    Total Cholesterol   116     Triglycerides   240     HDL Cholesterol   27     LDL Cholesterol    51     Hemoglobin A1C 5.90   5.80     Microalbumin, Urine <1.2       PSA   2.510              Lab Results   Component Value Date    COVID19 Not Detected 08/16/2021    INR 1.03 (L) 08/16/2021    BILIRUBINUR Negative 07/02/2021       Procedures        Assessment and Plan    Diagnoses and all orders for this visit:    1. Essential hypertension (Primary)    2. Lipoma of intra-abdominal organ    3. Right upper quadrant abdominal mass    4. Calculus of gallbladder without cholecystitis without obstruction    5. Paroxysmal atrial fibrillation    6. Hypogonadism in male    7. Liver cyst    8. Medication monitoring encounter    9. Prediabetes    Plan as documented above.  Patient would like to hold off on further surgical/treatment options for the the lipoma.  Given benign workup we did discuss  holding off on further treatment at this time.  Should he develop any abdominal pain he is instructed to let us know.  I did discuss with patient seeing him back in 3 months for follow-up for hypertension as well as hypogonadism.  He will be leaving for [place] again on 9/15/2024 and will be coming back on 4/6/2025.  He is typically gone for about 6 months or so when the weather gets cold here.      There are no discontinued medications.       Follow Up   Return in about 3 months (around 9/4/2024) for Hypertension.  Patient was given instructions and counseling regarding his condition or for health maintenance advice. Please see specific information pulled into the AVS if appropriate.       Ok Moore DO  06/18/24  11:43 EDT

## 2024-07-09 ENCOUNTER — TELEPHONE (OUTPATIENT)
Dept: CARDIOLOGY | Facility: CLINIC | Age: 84
End: 2024-07-09
Payer: MEDICARE

## 2024-07-09 NOTE — TELEPHONE ENCOUNTER
[facility] received a fax that requires your attention. The document has been indexed to the patient’s chart for your review.      Reason for sending: EXTERNAL MEDICAL RECORD NOTIFICATION     Documents Description: CARDIAC CLEARANCE REQ-GAURI FIELDS MD-7.9.24    Name of Sender: GAURI FIELDS MD     Date Indexed: 7.9.24

## 2024-07-09 NOTE — TELEPHONE ENCOUNTER
Procedure: Dental Procedure    Med Directive:Eliquis    PMH: PAF, HTN, HLD    Last Seen: 4/30/24

## 2024-08-05 DIAGNOSIS — E29.1 HYPOGONADISM IN MALE: ICD-10-CM

## 2024-08-05 RX ORDER — TESTOSTERONE 10 MG/.5G
40 GEL, METERED TOPICAL DAILY
Qty: 60 G | Refills: 2 | Status: SHIPPED | OUTPATIENT
Start: 2024-08-05

## 2024-08-05 RX ORDER — GABAPENTIN 600 MG/1
600 TABLET ORAL 3 TIMES DAILY
Qty: 90 TABLET | Refills: 2 | Status: SHIPPED | OUTPATIENT
Start: 2024-08-05

## 2024-08-05 NOTE — TELEPHONE ENCOUNTER
Caller: Melinda Bryson    Relationship: Emergency Contact    Best call back number: [phone]    Requested Prescriptions:   Requested Prescriptions     Pending Prescriptions Disp Refills    gabapentin (NEURONTIN) 600 MG tablet 90 tablet 2     Sig: Take 1 tablet by mouth 3 (Three) Times a Day.    Testosterone 10 MG/ACT (2%) gel 60 g 2     Sig: Place 40 mg on the skin as directed by provider Daily.        Pharmacy where request should be sent: [facility] [address] [phone] [facility] [phone] [facility]     Last office visit with prescribing clinician: 6/18/2024   Last telemedicine visit with prescribing clinician: Visit date not found   Next office visit with prescribing clinician: 9/9/2024     Does the patient have less than a 3 day supply:  [x] Yes  [] No    Jaun Morales Rep   08/05/24 11:35 EDT

## 2024-08-28 NOTE — PROGRESS NOTES
[facility]  Cardiology progress Note    Patient Name: Steve Bryson Jr.  : 1940    CHIEF COMPLAINT  Atrial fibrillation        Subjective   Subjective     HISTORY OF PRESENT ILLNESS    Steve Bryson Jr. is a 83 y.o. male with history of atrial fibrillation.  No palpitations.    REVIEW OF SYSTEMS    Constitutional:    No fever, no weight loss  Skin:     No rash  Otolaryngeal:    No difficulty swallowing  Cardiovascular: See HPI.  Pulmonary:    No cough, no sputum production    Personal History     Social History:    reports that he quit smoking about 43 years ago. His smoking use included cigarettes. He started smoking about 63 years ago. He has a 40 pack-year smoking history. He has been exposed to tobacco smoke. He has never used smokeless tobacco. He reports that he does not currently use alcohol. He reports that he does not use drugs.    Home Medications:  Current Outpatient Medications on File Prior to Visit   Medication Sig    alendronate (FOSAMAX) 70 MG tablet Take 1 tablet by mouth Every 7 (Seven) Days.     aspirin 81 MG EC tablet Take 1 tablet by mouth Daily.    atorvastatin (LIPITOR) 10 MG tablet Take 1 tablet by mouth Daily.    Calcium Carbonate-Vitamin D 600-10 MG-MCG per tablet Take 2 tablets by mouth Daily.    cetirizine (zyrTEC) 10 MG tablet Take 0.5 tablets by mouth Daily.    cyanocobalamin (VITAMIN B-12) 1000 MCG tablet Take 1 tablet by mouth Daily.    Eliquis 5 MG tablet tablet Take 1 tablet by mouth 2 (Two) Times a Day.    finasteride (PROSCAR) 5 MG tablet Take 1 tablet by mouth Daily.    fluticasone (FLONASE) 50 MCG/ACT nasal spray 2 sprays into the nostril(s) as directed by provider Daily.    gabapentin (NEURONTIN) 600 MG tablet Take 1 tablet by mouth 3 (Three) Times a Day.    hydroCHLOROthiazide 12.5 MG tablet Take 1 tablet by mouth Daily.    lisinopril (PRINIVIL,ZESTRIL) 20 MG tablet Take 1 tablet by mouth Daily.    metFORMIN (GLUCOPHAGE) 1000 MG tablet  Take 1 tablet by mouth 2 (Two) Times a Day With Meals.    metoprolol succinate XL (TOPROL-XL) 25 MG 24 hr tablet Take 1 tablet by mouth 2 (Two) Times a Day.    raNITIdine HCl (ZANTAC 150 MAXIMUM STRENGTH PO) Take 1 tablet by mouth 2 (Two) Times a Day.    tamsulosin (FLOMAX) 0.4 MG capsule 24 hr capsule Take 1 capsule by mouth Every Night.    Testosterone 10 MG/ACT (2%) gel Place 40 mg on the skin as directed by provider Daily.     No current facility-administered medications on file prior to visit.       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     PHYSICAL EXAM:    General Appearance:   well developed  well nourished  HENT:   oropharynx moist  lips not cyanotic  Neck:  thyroid not enlarged  supple  Respiratory:  no respiratory distress  normal breath sounds  no rales  Cardiovascular:  no jugular venous distention  regular rhythm  apical impulse normal  S1 normal, S2 normal  no S3, no S4   no murmur  no rub, no thrill  carotid pulses normal; no bruit  pedal pulses normal  lower extremity edema: none    Skin:   warm, dry  Psychiatric:  judgement and insight appropriate  normal mood and affect        Result Review:  I have personally reviewed the available results from  [x]  Laboratory  [x]  EKG  [x]  Cardiology  [x]  Medications  [x]  Old records  []  Other:     Procedures  Lab Results   Component Value Date    CHOL 116 04/23/2024    CHOL 126 04/07/2023    CHOL 118 04/18/2022     Lab Results   Component Value Date     TRIG 240 (H) 04/23/2024    TRIG 232 (H) 04/07/2023    TRIG 202 (H) 04/18/2022     Lab Results   Component Value Date    HDL 27 (L) 04/23/2024    HDL 28 (L) 04/07/2023    HDL 28 (L) 04/18/2022     Lab Results   Component Value Date    LDL 51 04/23/2024    LDL 60 04/07/2023    LDL 57 04/18/2022     Lab Results   Component Value Date    VLDL 38 04/23/2024    VLDL 38 04/07/2023    VLDL 33 04/18/2022     Results for orders placed during the hospital encounter of 07/02/21    Adult Transthoracic Echo Complete W/ Cont if Necessary Per Protocol    Interpretation Summary  1.  Normal left ventricular systolic function.  2.  Fibrocalcific mitral and aortic valves.  3.  Mild tricuspid regurgitation and trace mitral regurgitation.  4.  Trace aortic regurgitation.  5.  Biatrial enlargement noted.     Impression/Plan:  1.  Essential hypertension controlled: Continue lisinopril 20 mg once a day.  Continue Toprol-XL 25 mg twice a day.  Monitor blood pressure regularly.  2.  Mixed hyperlipidemia: Continue Lipitor 10 mg once a day.  Monitor lipid and hepatic profile.  3.  Paroxysmal atrial fibrillation: Continue Eliquis 5 mg twice a day for stroke prevention.  Continue Toprol-XL 25 mg twice a day for rate control.              Brian Muniz MD   08/29/24   10:42 EDT

## 2024-08-29 ENCOUNTER — OFFICE VISIT (OUTPATIENT)
Dept: CARDIOLOGY | Facility: CLINIC | Age: 84
End: 2024-08-29
Payer: MEDICARE

## 2024-08-29 VITALS
HEART RATE: 50 BPM | HEIGHT: 66 IN | WEIGHT: 203 LBS | SYSTOLIC BLOOD PRESSURE: 120 MMHG | BODY MASS INDEX: 32.62 KG/M2 | DIASTOLIC BLOOD PRESSURE: 58 MMHG

## 2024-08-29 DIAGNOSIS — I10 HYPERTENSION, ESSENTIAL: Primary | ICD-10-CM

## 2024-08-29 DIAGNOSIS — E78.2 HYPERLIPEMIA, MIXED: ICD-10-CM

## 2024-08-29 DIAGNOSIS — I48.0 PAROXYSMAL ATRIAL FIBRILLATION: ICD-10-CM

## 2024-08-29 PROCEDURE — 3078F DIAST BP <80 MM HG: CPT | Performed by: SPECIALIST

## 2024-08-29 PROCEDURE — 99214 OFFICE O/P EST MOD 30 MIN: CPT | Performed by: SPECIALIST

## 2024-08-29 PROCEDURE — 3074F SYST BP LT 130 MM HG: CPT | Performed by: SPECIALIST

## 2024-08-29 PROCEDURE — 1159F MED LIST DOCD IN RCRD: CPT | Performed by: SPECIALIST

## 2024-08-29 PROCEDURE — 1160F RVW MEDS BY RX/DR IN RCRD: CPT | Performed by: SPECIALIST

## 2024-09-09 ENCOUNTER — OFFICE VISIT (OUTPATIENT)
Dept: FAMILY MEDICINE CLINIC | Facility: CLINIC | Age: 84
End: 2024-09-09
Payer: MEDICARE

## 2024-09-09 VITALS
OXYGEN SATURATION: 98 % | HEART RATE: 61 BPM | WEIGHT: 205.5 LBS | TEMPERATURE: 97.5 F | BODY MASS INDEX: 33.03 KG/M2 | SYSTOLIC BLOOD PRESSURE: 122 MMHG | DIASTOLIC BLOOD PRESSURE: 58 MMHG | HEIGHT: 66 IN

## 2024-09-09 DIAGNOSIS — G89.29 CHRONIC BILATERAL LOW BACK PAIN WITHOUT SCIATICA: Primary | ICD-10-CM

## 2024-09-09 DIAGNOSIS — I10 ESSENTIAL HYPERTENSION: ICD-10-CM

## 2024-09-09 DIAGNOSIS — E78.2 MIXED HYPERLIPIDEMIA: ICD-10-CM

## 2024-09-09 DIAGNOSIS — E29.1 HYPOGONADISM IN MALE: ICD-10-CM

## 2024-09-09 DIAGNOSIS — M67.432 GANGLION CYST OF DORSUM OF LEFT WRIST: ICD-10-CM

## 2024-09-09 DIAGNOSIS — D17.5 LIPOMA OF INTRA-ABDOMINAL ORGAN: ICD-10-CM

## 2024-09-09 DIAGNOSIS — R73.03 PREDIABETES: ICD-10-CM

## 2024-09-09 DIAGNOSIS — M67.449 GANGLION CYST OF FINGER: ICD-10-CM

## 2024-09-09 DIAGNOSIS — M54.50 CHRONIC BILATERAL LOW BACK PAIN WITHOUT SCIATICA: Primary | ICD-10-CM

## 2024-09-09 DIAGNOSIS — E55.9 VITAMIN D DEFICIENCY: ICD-10-CM

## 2024-09-09 DIAGNOSIS — E53.8 B12 DEFICIENCY: ICD-10-CM

## 2024-09-09 DIAGNOSIS — I48.0 PAROXYSMAL ATRIAL FIBRILLATION: ICD-10-CM

## 2024-09-09 PROCEDURE — 1126F AMNT PAIN NOTED NONE PRSNT: CPT | Performed by: FAMILY MEDICINE

## 2024-09-09 PROCEDURE — 3074F SYST BP LT 130 MM HG: CPT | Performed by: FAMILY MEDICINE

## 2024-09-09 PROCEDURE — 99214 OFFICE O/P EST MOD 30 MIN: CPT | Performed by: FAMILY MEDICINE

## 2024-09-09 PROCEDURE — 3078F DIAST BP <80 MM HG: CPT | Performed by: FAMILY MEDICINE

## 2024-09-09 RX ORDER — GABAPENTIN 600 MG/1
600 TABLET ORAL 3 TIMES DAILY
Qty: 90 TABLET | Refills: 2 | Status: SHIPPED | OUTPATIENT
Start: 2024-09-09

## 2024-09-09 RX ORDER — TESTOSTERONE 10 MG/.5G
40 GEL, METERED TOPICAL DAILY
Qty: 60 G | Refills: 2 | Status: SHIPPED | OUTPATIENT
Start: 2024-09-09 | End: 2024-09-13

## 2024-09-09 NOTE — PROGRESS NOTES
Chief Complaint  Hypertension      Subjective          Steve Bryson Jr. presents to [facility]  History of Present Illness  Patient presents today to follow-up for hypertension.  Blood pressure has been adequately controlled taking lisinopril 20 mg daily as well as metoprolol 25 mg twice a day, and hydrochlorothiazide 12.5 mg daily.  He continues to take testosterone gel 40 mg daily.  He is requesting a refill today.  Parag, serum drug testing consent to been reviewed and are appropriate.  He also continues to take gabapentin to help out with chronic low back pain.  He is also requesting a refill.  He continues to see cardiology for atrial fibrillation.  He is anticoagulated with Eliquis.  His last labs showed his CBC within normal limits.  Vitamin D levels were adequate.  He does continue to take supplementation.  PSA level was within normal limits.  Vitamin B12 levels were normal.  Lipid panel showed the LDL at 51 with triglycerides at 240.  Patient continues to take atorvastatin 10 mg daily.  A1c was 5.8%.  Patient is in the prediabetic range.  We did discuss cutting back on carbohydrates/sugars.  CMP showed normal renal function with normal LFTs.  Testosterone levels were normal at 526.  I did discuss with patient continuing current management of hypogonadism.    Current Outpatient Medications   Medication Instructions    alendronate (FOSAMAX) 70 mg, Oral, Every 7 Days, SUNDAY    aspirin 81 mg, Oral, Daily    atorvastatin (LIPITOR) 10 mg, Oral, Daily    Calcium Carbonate-Vitamin D 600-10 MG-MCG per tablet 2 tablets, Oral, Daily    cetirizine (ZYRTEC) 5 mg, Oral, Daily    cyanocobalamin (VITAMIN B-12) 1,000 mcg, Oral, Daily    Eliquis 5 mg, Oral, 2 Times Daily    finasteride (PROSCAR) 5 mg, Oral, Daily    fluticasone (FLONASE) 50 MCG/ACT nasal spray 2 sprays, Nasal, Daily    gabapentin (NEURONTIN) 600 mg, Oral, 3 Times Daily    hydroCHLOROthiazide 12.5 mg, Oral, Daily     "lisinopril (PRINIVIL,ZESTRIL) 20 mg, Oral, Daily    metFORMIN (GLUCOPHAGE) 1,000 mg, Oral, 2 Times Daily With Meals    metoprolol succinate XL (TOPROL-XL) 25 mg, Oral, 2 Times Daily    raNITIdine HCl (ZANTAC 150 MAXIMUM STRENGTH PO) 1 tablet, Oral, 2 Times Daily    tamsulosin (FLOMAX) 0.4 mg, Oral, Nightly    Testosterone 40 mg, Transdermal, Daily       The following portions of the patient's history were reviewed and updated as appropriate: allergies, current medications, past family history, past medical history, past social history, past surgical history, and problem list.    Objective   Vital Signs:   /58 (BP Location: Left arm, Patient Position: Sitting)   Pulse 61   Temp 97.5 °F (36.4 °C) (Oral)   Ht 167.6 cm (66\")   Wt 93.2 kg (205 lb 8 oz)   SpO2 98%   BMI 33.17 kg/m²     BP Readings from Last 3 Encounters:   09/09/24 122/58   08/29/24 120/58   06/18/24 120/58     Wt Readings from Last 3 Encounters:   09/09/24 93.2 kg (205 lb 8 oz)   08/29/24 92.1 kg (203 lb)   06/18/24 97 kg (213 lb 14.4 oz)           Physical Exam  Vitals reviewed.   Constitutional:       Appearance: Normal appearance.   HENT:      Head: Normocephalic and atraumatic.      Right Ear: External ear normal.      Left Ear: External ear normal.   Eyes:      Conjunctiva/sclera: Conjunctivae normal.   Cardiovascular:      Rate and Rhythm: Normal rate and regular rhythm.      Heart sounds: No murmur heard.     No friction rub. No gallop.   Pulmonary:      Effort: Pulmonary effort is normal.      Breath sounds: Normal breath sounds. No wheezing or rhonchi.   Abdominal:      General: Bowel sounds are normal. There is no distension.      Palpations: Abdomen is soft.      Tenderness: There is no abdominal tenderness.   Skin:     General: Skin is warm and dry.   Neurological:      Mental Status: He is alert and oriented to person, place, and time.      Cranial Nerves: No cranial nerve deficit.   Psychiatric:         Mood and Affect: Mood and " affect normal.         Behavior: Behavior normal.         Thought Content: Thought content normal.         Judgment: Judgment normal.            Result Review :   The following data was reviewed by: Ok Moore DO on 09/09/2024:  Common labs          4/23/2024    09:00 4/23/2024    11:29   Common Labs   Glucose 114     BUN 15     Creatinine 1.18     Sodium 141     Potassium 4.6     Chloride 105     Calcium 10.3     Albumin 3.9     Total Bilirubin 0.6     Alkaline Phosphatase 69     AST (SGOT) 18     ALT (SGPT) 23     WBC  7.53    Hemoglobin  15.0    Hematocrit  43.5    Platelets  208    Total Cholesterol 116     Triglycerides 240     HDL Cholesterol 27     LDL Cholesterol  51     Hemoglobin A1C 5.80     PSA 2.510              Lab Results   Component Value Date    COVID19 Not Detected 08/16/2021    INR 1.03 (L) 08/16/2021    BILIRUBINUR Negative 07/02/2021       Procedures        Assessment and Plan    Diagnoses and all orders for this visit:    1. Chronic bilateral low back pain without sciatica (Primary)    2. Hypogonadism in male  -     Testosterone 10 MG/ACT (2%) gel; Place 40 mg on the skin as directed by provider Daily.  Dispense: 60 g; Refill: 2  -     Testosterone; Future    3. Paroxysmal atrial fibrillation    4. Essential hypertension  -     Comprehensive Metabolic Panel; Future  -     CBC & Differential; Future    5. Mixed hyperlipidemia  -     Lipid Panel; Future    6. Lipoma of intra-abdominal organ    7. Ganglion cyst of dorsum of left wrist    8. Ganglion cyst of finger, left thumb    9. Prediabetes  -     Hemoglobin A1c; Future    10. B12 deficiency  -     Vitamin B12; Future    11. Vitamin D deficiency  -     Vitamin D,25-Hydroxy; Future    Other orders  -     gabapentin (NEURONTIN) 600 MG tablet; Take 1 tablet by mouth 3 (Three) Times a Day.  Dispense: 90 tablet; Refill: 2    I did discuss with patient continuing current management of chronic conditions.  I did discuss with him seeing him back  in 3 months.  Patient instructed to call with any questions or concerns.  I have placed order for labs as well to be done prior to next appointment.     See note for indication of controlled substance.  Parag and drug screen have been reviewed.  Controlled substance agreement signed and scanned into chart.  After discussion of risk and benefits of medication, I have determined the patient is suitable for Rx while demonstrating the ability to safely follow and administer the medication plan.  Patient understands expectation that medication directions cannot be adjusted without a providers written approval.      Medications Discontinued During This Encounter   Medication Reason    gabapentin (NEURONTIN) 600 MG tablet Reorder    Testosterone 10 MG/ACT (2%) gel Reorder          Follow Up   Return in about 3 months (around 12/9/2024) for Medicare Wellness/hypertension.  Patient was given instructions and counseling regarding his condition or for health maintenance advice. Please see specific information pulled into the AVS if appropriate.       Ok Moore DO  09/09/24  10:12 EDT

## 2024-09-13 ENCOUNTER — TELEPHONE (OUTPATIENT)
Dept: FAMILY MEDICINE CLINIC | Facility: CLINIC | Age: 84
End: 2024-09-13
Payer: MEDICARE

## 2024-09-13 RX ORDER — TESTOSTERONE 1.62 MG/G
GEL TRANSDERMAL
Qty: 75 G | Refills: 1 | Status: SHIPPED | OUTPATIENT
Start: 2024-09-13

## 2024-09-13 NOTE — TELEPHONE ENCOUNTER
Pt came in with questions regarding his testosterone, pharmacy is no longer making a 2 percent, instead it is a 1.62 percent. Please advise thank you

## 2024-09-13 NOTE — TELEPHONE ENCOUNTER
Please inform patient that testosterone has been sent to [facility].  He will apply 2 pumps daily to the upper arms and shoulders in the morning.

## 2024-11-20 RX ORDER — TESTOSTERONE 1.62 MG/G
GEL TRANSDERMAL
Qty: 75 G | Refills: 1 | Status: SHIPPED | OUTPATIENT
Start: 2024-11-20

## 2024-11-20 NOTE — TELEPHONE ENCOUNTER
Caller: Melinda Bryson    Relationship: Emergency Contact    Best call back number: [phone]     Requested Prescriptions:   Requested Prescriptions     Pending Prescriptions Disp Refills    Testosterone (AndroGel Pump) 20.25 MG/ACT (1.62%) gel 75 g 1     Si.5 mg applied once daily in the morning to the shoulders and upper arms        Pharmacy where request should be sent: [facility] [phone] [place], [place] [phone]      Last office visit with prescribing clinician: 2024   Last telemedicine visit with prescribing clinician: Visit date not found   Next office visit with prescribing clinician: 2024     Additional details provided by patient: PATIENT HAS A WEEK LEFT OF MEDICATION     Does the patient have less than a 3 day supply:  [] Yes  [x] No      Jaun Vásquez Rep   24 12:34 EST

## 2024-12-02 ENCOUNTER — OFFICE VISIT (OUTPATIENT)
Dept: FAMILY MEDICINE CLINIC | Facility: CLINIC | Age: 84
End: 2024-12-02
Payer: MEDICARE

## 2024-12-02 VITALS
TEMPERATURE: 97.5 F | OXYGEN SATURATION: 99 % | SYSTOLIC BLOOD PRESSURE: 110 MMHG | HEIGHT: 66 IN | WEIGHT: 208 LBS | HEART RATE: 64 BPM | DIASTOLIC BLOOD PRESSURE: 54 MMHG | BODY MASS INDEX: 33.43 KG/M2

## 2024-12-02 DIAGNOSIS — M54.50 CHRONIC BILATERAL LOW BACK PAIN WITHOUT SCIATICA: ICD-10-CM

## 2024-12-02 DIAGNOSIS — E78.2 MIXED HYPERLIPIDEMIA: ICD-10-CM

## 2024-12-02 DIAGNOSIS — G89.29 CHRONIC BILATERAL LOW BACK PAIN WITHOUT SCIATICA: ICD-10-CM

## 2024-12-02 DIAGNOSIS — Z00.00 MEDICARE ANNUAL WELLNESS VISIT, SUBSEQUENT: Primary | ICD-10-CM

## 2024-12-02 DIAGNOSIS — R07.81 RIB PAIN ON LEFT SIDE: ICD-10-CM

## 2024-12-02 DIAGNOSIS — E29.1 HYPOGONADISM IN MALE: ICD-10-CM

## 2024-12-02 DIAGNOSIS — I10 ESSENTIAL HYPERTENSION: ICD-10-CM

## 2024-12-02 DIAGNOSIS — N40.0 BENIGN PROSTATIC HYPERPLASIA, UNSPECIFIED WHETHER LOWER URINARY TRACT SYMPTOMS PRESENT: ICD-10-CM

## 2024-12-02 DIAGNOSIS — Z51.81 MEDICATION MONITORING ENCOUNTER: ICD-10-CM

## 2024-12-02 PROCEDURE — G0439 PPPS, SUBSEQ VISIT: HCPCS | Performed by: FAMILY MEDICINE

## 2024-12-02 PROCEDURE — 3078F DIAST BP <80 MM HG: CPT | Performed by: FAMILY MEDICINE

## 2024-12-02 PROCEDURE — 1170F FXNL STATUS ASSESSED: CPT | Performed by: FAMILY MEDICINE

## 2024-12-02 PROCEDURE — 1125F AMNT PAIN NOTED PAIN PRSNT: CPT | Performed by: FAMILY MEDICINE

## 2024-12-02 PROCEDURE — 99214 OFFICE O/P EST MOD 30 MIN: CPT | Performed by: FAMILY MEDICINE

## 2024-12-02 PROCEDURE — 3074F SYST BP LT 130 MM HG: CPT | Performed by: FAMILY MEDICINE

## 2024-12-02 NOTE — PROGRESS NOTES
Subjective   The ABCs of the Annual Wellness Visit  Medicare Wellness Visit      Steve Bryson Jr. is a 83 y.o. patient who presents for a Medicare Wellness Visit.    The following portions of the patient's history were reviewed and   updated as appropriate: allergies, current medications, past family history, past medical history, past social history, past surgical history, and problem list.    Compared to one year ago, the patient's physical   health is worse.  Compared to one year ago, the patient's mental   health is the same.    Recent Hospitalizations:  He was not admitted to the hospital during the last year.     Current Medical Providers:  Patient Care Team:  Ok Moore DO as PCP - General (Family Medicine)  Yousif Brice MD as Consulting Physician (General Surgery)  Yudelka Ruvalcaba APRN (Inactive) as Nurse Practitioner (Otolaryngology)    Outpatient Medications Prior to Visit   Medication Sig Dispense Refill    alendronate (FOSAMAX) 70 MG tablet Take 1 tablet by mouth Every 7 (Seven) Days. SUNDAY 13 tablet 3    aspirin 81 MG EC tablet Take 1 tablet by mouth Daily. 90 tablet 3    atorvastatin (LIPITOR) 10 MG tablet Take 1 tablet by mouth Daily. 90 tablet 3    Calcium Carbonate-Vitamin D 600-10 MG-MCG per tablet Take 2 tablets by mouth Daily. 180 tablet 3    cetirizine (zyrTEC) 10 MG tablet Take 0.5 tablets by mouth Daily. 45 tablet 3    cyanocobalamin (VITAMIN B-12) 1000 MCG tablet Take 1 tablet by mouth Daily. 90 tablet 3    Eliquis 5 MG tablet tablet Take 1 tablet by mouth 2 (Two) Times a Day. 180 tablet 3    finasteride (PROSCAR) 5 MG tablet Take 1 tablet by mouth Daily. 90 tablet 3    fluticasone (FLONASE) 50 MCG/ACT nasal spray 2 sprays into the nostril(s) as directed by provider Daily. 48 g 3    gabapentin (NEURONTIN) 600 MG tablet Take 1 tablet by mouth 3 (Three) Times a Day. 90 tablet 2    hydroCHLOROthiazide 12.5 MG tablet Take 1 tablet by mouth Daily. 90 tablet 3    lisinopril  (PRINIVIL,ZESTRIL) 20 MG tablet Take 1 tablet by mouth Daily. 90 tablet 3    metFORMIN (GLUCOPHAGE) 1000 MG tablet Take 1 tablet by mouth 2 (Two) Times a Day With Meals. 180 tablet 3    metoprolol succinate XL (TOPROL-XL) 25 MG 24 hr tablet Take 1 tablet by mouth 2 (Two) Times a Day. 180 tablet 3    raNITIdine HCl (ZANTAC 150 MAXIMUM STRENGTH PO) Take 1 tablet by mouth 2 (Two) Times a Day.      tamsulosin (FLOMAX) 0.4 MG capsule 24 hr capsule Take 1 capsule by mouth Every Night. 90 capsule 3    Testosterone (AndroGel Pump) 20.25 MG/ACT (1.62%) gel 40.5 mg applied once daily in the morning to the shoulders and upper arms 75 g 1     No facility-administered medications prior to visit.     No opioid medication identified on active medication list. I have reviewed chart for other potential  high risk medication/s and harmful drug interactions in the elderly.      Aspirin is on active medication list. Aspirin use is indicated based on review of current medical condition/s. Pros and cons of this therapy have been discussed today. Benefits of this medication outweigh potential harm.  Patient has been encouraged to continue taking this medication.  .      Patient Active Problem List   Diagnosis    Compression fracture of thoracic vertebra with delayed healing    Compression fracture of lumbar vertebra with routine healing    Bone lesion    Paroxysmal atrial fibrillation    Essential hypertension    Mixed hyperlipidemia    Allergic rhinitis    Benign neoplasm of thyroid gland    BPH with urinary obstruction    Other specified disorders of male genital organs    Other testicular hypofunction    Type 2 diabetes mellitus    Hypogonadism in male     Advance Care Planning Advance Directive is not on file.  ACP discussion was held with the patient during this visit. Patient has an advance directive (not in EMR), copy requested.            Objective   Vitals:    12/02/24 1319   BP: 110/54   Pulse: 64   Temp: 97.5 °F (36.4 °C)  "  TempSrc: Oral   SpO2: 99%   Weight: 94.3 kg (208 lb)   Height: 167.6 cm (66\")   PainSc:   6   PainLoc: Rib Cage       Estimated body mass index is 33.57 kg/m² as calculated from the following:    Height as of this encounter: 167.6 cm (66\").    Weight as of this encounter: 94.3 kg (208 lb).            Does the patient have evidence of cognitive impairment? No                                                                                                Health  Risk Assessment    Smoking Status:  Social History     Tobacco Use   Smoking Status Former    Current packs/day: 0.00    Average packs/day: 2.0 packs/day for 20.0 years (40.0 ttl pk-yrs)    Types: Cigarettes    Start date:     Quit date:     Years since quittin.9    Passive exposure: Past   Smokeless Tobacco Never   Tobacco Comments    14-QUIT 25-30 YEARS AGO     Alcohol Consumption:  Social History     Substance and Sexual Activity   Alcohol Use Not Currently       Fall Risk Screen  STEADI Fall Risk Assessment was completed, and patient is at MODERATE risk for falls. Assessment completed on:2024    Depression Screening   Little interest or pleasure in doing things? Not at all   Feeling down, depressed, or hopeless? Not at all   PHQ-2 Total Score 0      Health Habits and Functional and Cognitive Screenin/2/2024     1:24 PM   Functional & Cognitive Status   Do you have difficulty preparing food and eating? No   Do you have difficulty bathing yourself, getting dressed or grooming yourself? No   Do you have difficulty using the toilet? No   Do you have difficulty moving around from place to place? No   Do you have trouble with steps or getting out of a bed or a chair? No   Current Diet Well Balanced Diet   Dental Exam Up to date   Eye Exam Not up to date   Exercise (times per week) 2 times per week   Current Exercises Include Walking   Do you need help using the phone?  No   Are you deaf or do you have serious difficulty hearing?  " Yes   Do you need help to go to places out of walking distance? Yes   Do you need help shopping? No   Do you need help preparing meals?  No   Do you need help with housework?  No   Do you need help with laundry? No   Do you need help taking your medications? No   Do you need help managing money? No   Do you ever drive or ride in a car without wearing a seat belt? No   Have you felt unusual stress, anger or loneliness in the last month? No   Who do you live with? Spouse   If you need help, do you have trouble finding someone available to you? No   Have you been bothered in the last four weeks by sexual problems? No   Do you have difficulty concentrating, remembering or making decisions? Yes           Age-appropriate Screening Schedule:  Refer to the list below for future screening recommendations based on patient's age, sex and/or medical conditions. Orders for these recommended tests are listed in the plan section. The patient has been provided with a written plan.    Health Maintenance List  Health Maintenance   Topic Date Due    DIABETIC FOOT EXAM  Never done    DIABETIC EYE EXAM  04/17/2019    ZOSTER VACCINE (3 of 3) 12/20/2022    HEMOGLOBIN A1C  10/23/2024    BMI FOLLOWUP  04/16/2025    LIPID PANEL  04/23/2025    ANNUAL WELLNESS VISIT  12/02/2025    TDAP/TD VACCINES (3 - Td or Tdap) 12/15/2025    COVID-19 Vaccine  Completed    RSV Vaccine - Adults  Completed    INFLUENZA VACCINE  Completed    Pneumococcal Vaccine 65+  Completed    URINE MICROALBUMIN  Discontinued                                                                                                                                                CMS Preventative Services Quick Reference  Risk Factors Identified During Encounter  Polypharmacy: Medication List reviewed    The above risks/problems have been discussed with the patient.  Pertinent information has been shared with the patient in the After Visit Summary.  An After Visit Summary and PPPS were  "made available to the patient.    Follow Up:   Next Medicare Wellness visit to be scheduled in 1 year.          Additional E&M Note during same encounter follows:  Patient has multiple medical problems which are significant and separately identifiable that require additional work above and beyond the Medicare Wellness Visit.      Chief Complaint  Medicare visit  Left rib pain from lifting    Steve Bryson Jr. is a 83 y.o. male who presents to [facility]     History of Present Illness  The patient is an 83-year-old male who presents today for a Medicare wellness visit. He is accompanied by his wife.    He reports feeling worse today compared to a year ago, both physically and mentally. He is currently experiencing stress due to his son's cancer diagnosis.    He has not been hospitalized in the past year and does not require any medication refills at this time. His current medications include hydrochlorothiazide 12.5 mg daily, metoprolol 25 mg twice daily, lisinopril 20 mg daily, atorvastatin, testosterone, and gabapentin. He has been tolerating the testosterone well and continues to take gabapentin for chronic low back pain. He has an upcoming appointment with a cardiologist in 04/2024.    He uses hearing aids and has not experienced any falls or heard any cracking sounds. Recently, he experienced left-sided pain after lifting heavy objects, which started to improve but then he lifted again and started having pain again.  Denies any radiation of pain other than into his low back area which he does have chronic low back pain.    Objective   Vital Signs:   Vitals:    12/02/24 1319   BP: 110/54   Pulse: 64   Temp: 97.5 °F (36.4 °C)   TempSrc: Oral   SpO2: 99%   Weight: 94.3 kg (208 lb)   Height: 167.6 cm (66\")   PainSc:   6   PainLoc: Rib Cage       Wt Readings from Last 3 Encounters:   12/02/24 94.3 kg (208 lb)   09/09/24 93.2 kg (205 lb 8 oz)   08/29/24 92.1 kg (203 lb)     BP " Readings from Last 3 Encounters:   12/02/24 110/54   09/09/24 122/58   08/29/24 120/58       Physical Exam  Vitals reviewed.   Constitutional:       Appearance: Normal appearance.   HENT:      Head: Normocephalic and atraumatic.      Right Ear: External ear normal.      Left Ear: External ear normal.   Eyes:      Conjunctiva/sclera: Conjunctivae normal.   Cardiovascular:      Rate and Rhythm: Normal rate and regular rhythm.      Heart sounds: No murmur heard.     No friction rub. No gallop.   Pulmonary:      Effort: Pulmonary effort is normal.      Breath sounds: Normal breath sounds. No wheezing or rhonchi.   Abdominal:      General: Bowel sounds are normal. There is no distension.      Palpations: Abdomen is soft.      Tenderness: There is no abdominal tenderness.   Musculoskeletal:      Comments: Reproducible tenderness to palpation on the left chest wall area between the anterior and posterior axillary line at the seventh rib.   Skin:     General: Skin is warm and dry.   Neurological:      Mental Status: He is alert and oriented to person, place, and time.      Cranial Nerves: No cranial nerve deficit.   Psychiatric:         Mood and Affect: Mood and affect normal.         Behavior: Behavior normal.         Thought Content: Thought content normal.         Judgment: Judgment normal.         Physical Exam      The following data was reviewed by Ok Moore DO on 12/02/2024  Common Labs   Common labs          4/23/2024    09:00 4/23/2024    11:29   Common Labs   Glucose 114     BUN 15     Creatinine 1.18     Sodium 141     Potassium 4.6     Chloride 105     Calcium 10.3     Albumin 3.9     Total Bilirubin 0.6     Alkaline Phosphatase 69     AST (SGOT) 18     ALT (SGPT) 23     WBC  7.53    Hemoglobin  15.0    Hematocrit  43.5    Platelets  208    Total Cholesterol 116     Triglycerides 240     HDL Cholesterol 27     LDL Cholesterol  51     Hemoglobin A1C 5.80     PSA 2.510         Results  Laboratory  Studies  Testosterone level was 526. PSA level was 2.510. Vitamin D levels were good. B12 level was 765.        Assessment & Plan   Diagnoses and all orders for this visit:    1. Medicare annual wellness visit, subsequent (Primary)    2. Medication monitoring encounter    3. Essential hypertension    4. Mixed hyperlipidemia    5. Hypogonadism in male    6. Benign prostatic hyperplasia, unspecified whether lower urinary tract symptoms present    7. Chronic bilateral low back pain without sciatica    8. Rib pain on left side        Assessment & Plan  1. Medicare wellness visit.  He reports feeling worse physically compared to a year ago and has experienced significant stress due to his son's cancer. He has not been admitted to a hospital in the past year. He wears hearing aids, which are currently being repaired. He does not need any medication refills today. He is currently taking hydrochlorothiazide 12.5 mg daily, metoprolol 25 mg twice a day, lisinopril 20 mg daily, atorvastatin, and testosterone AndroGel pump (two pumps or 40.5 mg once a day to the shoulders and upper arms). He is also taking gabapentin for chronic low back pain. His testosterone levels were previously low but have shown improvement with medication, currently at 526. His PSA level is satisfactory at 2.510. His vitamin D and B12 levels are also within the normal range. Labs will be ordered to monitor his testosterone, vitamin D, and B12 levels. He is advised to apply ice to the affected area and perform stretching exercises. He should avoid heavy lifting until he feels better.    2. Left side pain.  He reports pain on the left side after lifting heavy objects. The pain has been present for less than a week and initially improved but worsened after additional lifting. The pain is likely due to a muscle strain. He is advised to apply ice to the affected area and perform stretching exercises. He should avoid heavy lifting until he feels better. If the  pain persists, further evaluation may be needed.    Follow-up  Return in 3 months for follow up.               FOLLOW UP  Return in about 3 months (around 3/2/2025) for Hypertension.  Patient was given instructions and counseling regarding his condition or for health maintenance advice. Please see specific information pulled into the AVS if appropriate.     Patient or patient representative verbalized consent for the use of Ambient Listening during the visit with  Ok Moore DO for chart documentation. 12/2/2024  14:00 EST    Ok Moore DO  12/02/24  14:12 EST

## 2024-12-03 ENCOUNTER — CLINICAL SUPPORT (OUTPATIENT)
Dept: FAMILY MEDICINE CLINIC | Facility: CLINIC | Age: 84
End: 2024-12-03
Payer: MEDICARE

## 2024-12-03 DIAGNOSIS — E78.2 MIXED HYPERLIPIDEMIA: ICD-10-CM

## 2024-12-03 DIAGNOSIS — I10 ESSENTIAL HYPERTENSION: ICD-10-CM

## 2024-12-03 DIAGNOSIS — E29.1 HYPOGONADISM IN MALE: ICD-10-CM

## 2024-12-03 DIAGNOSIS — R73.03 PREDIABETES: ICD-10-CM

## 2024-12-03 DIAGNOSIS — E55.9 VITAMIN D DEFICIENCY: ICD-10-CM

## 2024-12-03 DIAGNOSIS — E53.8 B12 DEFICIENCY: ICD-10-CM

## 2024-12-03 LAB
25(OH)D3 SERPL-MCNC: 38.7 NG/ML (ref 30–100)
ALBUMIN SERPL-MCNC: 4.1 G/DL (ref 3.5–5.2)
ALBUMIN/GLOB SERPL: 1.2 G/DL
ALP SERPL-CCNC: 82 U/L (ref 39–117)
ALT SERPL W P-5'-P-CCNC: 22 U/L (ref 1–41)
ANION GAP SERPL CALCULATED.3IONS-SCNC: 8.4 MMOL/L (ref 5–15)
AST SERPL-CCNC: 17 U/L (ref 1–40)
BASOPHILS # BLD AUTO: 0.09 10*3/MM3 (ref 0–0.2)
BASOPHILS NFR BLD AUTO: 1.3 % (ref 0–1.5)
BILIRUB SERPL-MCNC: 0.8 MG/DL (ref 0–1.2)
BUN SERPL-MCNC: 15 MG/DL (ref 8–23)
BUN/CREAT SERPL: 13.6 (ref 7–25)
CALCIUM SPEC-SCNC: 10.4 MG/DL (ref 8.6–10.5)
CHLORIDE SERPL-SCNC: 103 MMOL/L (ref 98–107)
CHOLEST SERPL-MCNC: 118 MG/DL (ref 0–200)
CO2 SERPL-SCNC: 27.6 MMOL/L (ref 22–29)
CREAT SERPL-MCNC: 1.1 MG/DL (ref 0.76–1.27)
DEPRECATED RDW RBC AUTO: 45.3 FL (ref 37–54)
EGFRCR SERPLBLD CKD-EPI 2021: 67.4 ML/MIN/1.73
EOSINOPHIL # BLD AUTO: 0.43 10*3/MM3 (ref 0–0.4)
EOSINOPHIL NFR BLD AUTO: 6.4 % (ref 0.3–6.2)
ERYTHROCYTE [DISTWIDTH] IN BLOOD BY AUTOMATED COUNT: 13 % (ref 12.3–15.4)
GLOBULIN UR ELPH-MCNC: 3.3 GM/DL
GLUCOSE SERPL-MCNC: 109 MG/DL (ref 65–99)
HBA1C MFR BLD: 5.6 % (ref 4.8–5.6)
HCT VFR BLD AUTO: 43.2 % (ref 37.5–51)
HDLC SERPL-MCNC: 27 MG/DL (ref 40–60)
HGB BLD-MCNC: 15.3 G/DL (ref 13–17.7)
IMM GRANULOCYTES # BLD AUTO: 0.02 10*3/MM3 (ref 0–0.05)
IMM GRANULOCYTES NFR BLD AUTO: 0.3 % (ref 0–0.5)
LDLC SERPL CALC-MCNC: 62 MG/DL (ref 0–100)
LDLC/HDLC SERPL: 2.13 {RATIO}
LYMPHOCYTES # BLD AUTO: 1.69 10*3/MM3 (ref 0.7–3.1)
LYMPHOCYTES NFR BLD AUTO: 25.1 % (ref 19.6–45.3)
MCH RBC QN AUTO: 33.8 PG (ref 26.6–33)
MCHC RBC AUTO-ENTMCNC: 35.4 G/DL (ref 31.5–35.7)
MCV RBC AUTO: 95.4 FL (ref 79–97)
MONOCYTES # BLD AUTO: 0.57 10*3/MM3 (ref 0.1–0.9)
MONOCYTES NFR BLD AUTO: 8.5 % (ref 5–12)
NEUTROPHILS NFR BLD AUTO: 3.92 10*3/MM3 (ref 1.7–7)
NEUTROPHILS NFR BLD AUTO: 58.4 % (ref 42.7–76)
NRBC BLD AUTO-RTO: 0 /100 WBC (ref 0–0.2)
PLATELET # BLD AUTO: 200 10*3/MM3 (ref 140–450)
PMV BLD AUTO: 11.4 FL (ref 6–12)
POTASSIUM SERPL-SCNC: 4.3 MMOL/L (ref 3.5–5.2)
PROT SERPL-MCNC: 7.4 G/DL (ref 6–8.5)
RBC # BLD AUTO: 4.53 10*6/MM3 (ref 4.14–5.8)
SODIUM SERPL-SCNC: 139 MMOL/L (ref 136–145)
TESTOST SERPL-MCNC: 133 NG/DL (ref 193–740)
TRIGL SERPL-MCNC: 168 MG/DL (ref 0–150)
VIT B12 BLD-MCNC: 649 PG/ML (ref 211–946)
VLDLC SERPL-MCNC: 29 MG/DL (ref 5–40)
WBC NRBC COR # BLD AUTO: 6.72 10*3/MM3 (ref 3.4–10.8)

## 2024-12-03 PROCEDURE — 82306 VITAMIN D 25 HYDROXY: CPT | Performed by: FAMILY MEDICINE

## 2024-12-03 PROCEDURE — 82607 VITAMIN B-12: CPT | Performed by: FAMILY MEDICINE

## 2024-12-03 PROCEDURE — 85025 COMPLETE CBC W/AUTO DIFF WBC: CPT | Performed by: FAMILY MEDICINE

## 2024-12-03 PROCEDURE — 80061 LIPID PANEL: CPT | Performed by: FAMILY MEDICINE

## 2024-12-03 PROCEDURE — 84403 ASSAY OF TOTAL TESTOSTERONE: CPT | Performed by: FAMILY MEDICINE

## 2024-12-03 PROCEDURE — 36415 COLL VENOUS BLD VENIPUNCTURE: CPT | Performed by: FAMILY MEDICINE

## 2024-12-03 PROCEDURE — 83036 HEMOGLOBIN GLYCOSYLATED A1C: CPT | Performed by: FAMILY MEDICINE

## 2024-12-03 PROCEDURE — 80053 COMPREHEN METABOLIC PANEL: CPT | Performed by: FAMILY MEDICINE

## 2024-12-23 RX ORDER — GABAPENTIN 600 MG/1
600 TABLET ORAL 3 TIMES DAILY
Qty: 90 TABLET | Refills: 2 | Status: SHIPPED | OUTPATIENT
Start: 2024-12-23

## 2024-12-23 NOTE — TELEPHONE ENCOUNTER
Caller: Melinda Bryson    Relationship: Emergency Contact    Best call back number: [phone]     Requested Prescriptions:   Requested Prescriptions     Pending Prescriptions Disp Refills    gabapentin (NEURONTIN) 600 MG tablet 90 tablet 2     Sig: Take 1 tablet by mouth 3 (Three) Times a Day.        Pharmacy where request should be sent: [place] [id number] [facility] [phone]      Last office visit with prescribing clinician: 12/2/2024   Last telemedicine visit with prescribing clinician: Visit date not found   Next office visit with prescribing clinician: 3/25/2025     Does the patient have less than a 3 day supply:  [x] Yes  [] No    Would you like a call back once the refill request has been completed: [x] Yes [] No    If the office needs to give you a call back, can they leave a voicemail: [x] Yes [] No    Jaun Rowley Rep   12/23/24 12:11 EST

## 2025-01-23 RX ORDER — TESTOSTERONE 1.62 MG/G
GEL TRANSDERMAL
Qty: 75 G | Refills: 1 | Status: SHIPPED | OUTPATIENT
Start: 2025-01-23

## 2025-01-23 NOTE — TELEPHONE ENCOUNTER
Caller: Melinda Bryson    Relationship: Emergency Contact    Best call back number: [phone]    Requested Prescriptions:   Requested Prescriptions     Pending Prescriptions Disp Refills    Testosterone (AndroGel Pump) 20.25 MG/ACT (1.62%) gel 75 g 1     Si.5 mg applied once daily in the morning to the shoulders and upper arms        Pharmacy where request should be sent: [place] [phone] [place], [place] [phone]      Last office visit with prescribing clinician: 2024   Last telemedicine visit with prescribing clinician: Visit date not found   Next office visit with prescribing clinician: 3/25/2025     Additional details provided by patient: PATIENT HAS LESS THAN A THREE DAY SUPPLY OF MEDICATION. PLEASE SEND NEW PRESCRIPTION WITH REFILLS TO PHARMACY ASAP.    Does the patient have less than a 3 day supply:  [x] Yes  [] No    Would you like a call back once the refill request has been completed: [] Yes [] No    If the office needs to give you a call back, can they leave a voicemail: [] Yes [] No    Jaun Ocasio Rep   25 08:42 EST

## 2025-02-07 ENCOUNTER — APPOINTMENT (OUTPATIENT)
Facility: HOSPITAL | Age: 85
End: 2025-02-07
Payer: MEDICARE

## 2025-02-07 ENCOUNTER — TELEPHONE (OUTPATIENT)
Dept: FAMILY MEDICINE CLINIC | Facility: CLINIC | Age: 85
End: 2025-02-07
Payer: MEDICARE

## 2025-02-07 ENCOUNTER — HOSPITAL ENCOUNTER (EMERGENCY)
Facility: HOSPITAL | Age: 85
Discharge: HOME | End: 2025-02-07
Attending: EMERGENCY MEDICINE
Payer: MEDICARE

## 2025-02-07 VITALS
OXYGEN SATURATION: 100 % | RESPIRATION RATE: 16 BRPM | HEART RATE: 75 BPM | SYSTOLIC BLOOD PRESSURE: 128 MMHG | DIASTOLIC BLOOD PRESSURE: 55 MMHG | BODY MASS INDEX: 32.95 KG/M2 | HEIGHT: 66 IN | WEIGHT: 205.03 LBS | TEMPERATURE: 97.6 F

## 2025-02-07 DIAGNOSIS — M25.562 ACUTE PAIN OF LEFT KNEE: Primary | ICD-10-CM

## 2025-02-07 DIAGNOSIS — R60.0 BILATERAL LEG EDEMA: ICD-10-CM

## 2025-02-07 DIAGNOSIS — M25.462 EFFUSION OF LEFT KNEE: ICD-10-CM

## 2025-02-07 LAB
ALBUMIN SERPL-MCNC: 3.8 G/DL (ref 3.5–5.2)
ALBUMIN/GLOB SERPL: 1.1 G/DL
ALP SERPL-CCNC: 71 U/L (ref 39–117)
ALT SERPL W P-5'-P-CCNC: 13 U/L (ref 1–41)
ANION GAP SERPL CALCULATED.3IONS-SCNC: 10.5 MMOL/L (ref 5–15)
AST SERPL-CCNC: 12 U/L (ref 1–40)
BASOPHILS # BLD AUTO: 0.07 10*3/MM3 (ref 0–0.2)
BASOPHILS NFR BLD AUTO: 0.6 % (ref 0–1.5)
BH CV LOWER VASCULAR LEFT COMMON FEMORAL AUGMENT: NORMAL
BH CV LOWER VASCULAR LEFT COMMON FEMORAL COMPETENT: NORMAL
BH CV LOWER VASCULAR LEFT COMMON FEMORAL COMPRESS: NORMAL
BH CV LOWER VASCULAR LEFT COMMON FEMORAL PHASIC: NORMAL
BH CV LOWER VASCULAR LEFT COMMON FEMORAL SPONT: NORMAL
BH CV LOWER VASCULAR LEFT DISTAL FEMORAL COMPRESS: NORMAL
BH CV LOWER VASCULAR LEFT GASTRONEMIUS COMPRESS: NORMAL
BH CV LOWER VASCULAR LEFT GREATER SAPH AK COMPRESS: NORMAL
BH CV LOWER VASCULAR LEFT GREATER SAPH BK COMPRESS: NORMAL
BH CV LOWER VASCULAR LEFT LESSER SAPH COMPRESS: NORMAL
BH CV LOWER VASCULAR LEFT MID FEMORAL AUGMENT: NORMAL
BH CV LOWER VASCULAR LEFT MID FEMORAL COMPETENT: NORMAL
BH CV LOWER VASCULAR LEFT MID FEMORAL COMPRESS: NORMAL
BH CV LOWER VASCULAR LEFT MID FEMORAL PHASIC: NORMAL
BH CV LOWER VASCULAR LEFT MID FEMORAL SPONT: NORMAL
BH CV LOWER VASCULAR LEFT PERONEAL COMPRESS: NORMAL
BH CV LOWER VASCULAR LEFT POPLITEAL AUGMENT: NORMAL
BH CV LOWER VASCULAR LEFT POPLITEAL COMPETENT: NORMAL
BH CV LOWER VASCULAR LEFT POPLITEAL COMPRESS: NORMAL
BH CV LOWER VASCULAR LEFT POPLITEAL PHASIC: NORMAL
BH CV LOWER VASCULAR LEFT POPLITEAL SPONT: NORMAL
BH CV LOWER VASCULAR LEFT POSTERIOR TIBIAL COMPRESS: NORMAL
BH CV LOWER VASCULAR LEFT PROXIMAL FEMORAL COMPRESS: NORMAL
BH CV LOWER VASCULAR LEFT SAPHENOFEMORAL JUNCTION COMPRESS: NORMAL
BH CV LOWER VASCULAR RIGHT COMMON FEMORAL AUGMENT: NORMAL
BH CV LOWER VASCULAR RIGHT COMMON FEMORAL COMPETENT: NORMAL
BH CV LOWER VASCULAR RIGHT COMMON FEMORAL COMPRESS: NORMAL
BH CV LOWER VASCULAR RIGHT COMMON FEMORAL PHASIC: NORMAL
BH CV LOWER VASCULAR RIGHT COMMON FEMORAL SPONT: NORMAL
BH CV LOWER VASCULAR RIGHT DISTAL FEMORAL COMPRESS: NORMAL
BH CV LOWER VASCULAR RIGHT GASTRONEMIUS COMPRESS: NORMAL
BH CV LOWER VASCULAR RIGHT GREATER SAPH AK COMPRESS: NORMAL
BH CV LOWER VASCULAR RIGHT GREATER SAPH BK COMPRESS: NORMAL
BH CV LOWER VASCULAR RIGHT LESSER SAPH COMPRESS: NORMAL
BH CV LOWER VASCULAR RIGHT MID FEMORAL AUGMENT: NORMAL
BH CV LOWER VASCULAR RIGHT MID FEMORAL COMPETENT: NORMAL
BH CV LOWER VASCULAR RIGHT MID FEMORAL COMPRESS: NORMAL
BH CV LOWER VASCULAR RIGHT MID FEMORAL PHASIC: NORMAL
BH CV LOWER VASCULAR RIGHT MID FEMORAL SPONT: NORMAL
BH CV LOWER VASCULAR RIGHT PERONEAL COMPRESS: NORMAL
BH CV LOWER VASCULAR RIGHT POPLITEAL AUGMENT: NORMAL
BH CV LOWER VASCULAR RIGHT POPLITEAL COMPETENT: NORMAL
BH CV LOWER VASCULAR RIGHT POPLITEAL COMPRESS: NORMAL
BH CV LOWER VASCULAR RIGHT POPLITEAL PHASIC: NORMAL
BH CV LOWER VASCULAR RIGHT POPLITEAL SPONT: NORMAL
BH CV LOWER VASCULAR RIGHT POSTERIOR TIBIAL COMPRESS: NORMAL
BH CV LOWER VASCULAR RIGHT PROXIMAL FEMORAL COMPRESS: NORMAL
BH CV LOWER VASCULAR RIGHT SAPHENOFEMORAL JUNCTION COMPRESS: NORMAL
BH CV VAS PRELIMINARY FINDINGS SCRIPTING: 1
BILIRUB SERPL-MCNC: 1.5 MG/DL (ref 0–1.2)
BUN SERPL-MCNC: 16 MG/DL (ref 8–23)
BUN/CREAT SERPL: 16 (ref 7–25)
CALCIUM SPEC-SCNC: 10.4 MG/DL (ref 8.6–10.5)
CHLORIDE SERPL-SCNC: 101 MMOL/L (ref 98–107)
CO2 SERPL-SCNC: 27.5 MMOL/L (ref 22–29)
CREAT SERPL-MCNC: 1 MG/DL (ref 0.76–1.27)
DEPRECATED RDW RBC AUTO: 43.1 FL (ref 37–54)
EGFRCR SERPLBLD CKD-EPI 2021: 75.1 ML/MIN/1.73
EOSINOPHIL # BLD AUTO: 0.35 10*3/MM3 (ref 0–0.4)
EOSINOPHIL NFR BLD AUTO: 3.2 % (ref 0.3–6.2)
ERYTHROCYTE [DISTWIDTH] IN BLOOD BY AUTOMATED COUNT: 12.2 % (ref 12.3–15.4)
GLOBULIN UR ELPH-MCNC: 3.5 GM/DL
GLUCOSE SERPL-MCNC: 106 MG/DL (ref 65–99)
HCT VFR BLD AUTO: 41.8 % (ref 37.5–51)
HGB BLD-MCNC: 14.4 G/DL (ref 13–17.7)
HOLD SPECIMEN: NORMAL
IMM GRANULOCYTES # BLD AUTO: 0.03 10*3/MM3 (ref 0–0.05)
IMM GRANULOCYTES NFR BLD AUTO: 0.3 % (ref 0–0.5)
LYMPHOCYTES # BLD AUTO: 1.73 10*3/MM3 (ref 0.7–3.1)
LYMPHOCYTES NFR BLD AUTO: 15.8 % (ref 19.6–45.3)
MCH RBC QN AUTO: 33 PG (ref 26.6–33)
MCHC RBC AUTO-ENTMCNC: 34.4 G/DL (ref 31.5–35.7)
MCV RBC AUTO: 95.9 FL (ref 79–97)
MONOCYTES # BLD AUTO: 1.26 10*3/MM3 (ref 0.1–0.9)
MONOCYTES NFR BLD AUTO: 11.5 % (ref 5–12)
NEUTROPHILS NFR BLD AUTO: 68.6 % (ref 42.7–76)
NEUTROPHILS NFR BLD AUTO: 7.48 10*3/MM3 (ref 1.7–7)
NRBC BLD AUTO-RTO: 0 /100 WBC (ref 0–0.2)
NT-PROBNP SERPL-MCNC: 533.9 PG/ML (ref 0–1800)
PLATELET # BLD AUTO: 188 10*3/MM3 (ref 140–450)
PMV BLD AUTO: 10.7 FL (ref 6–12)
POTASSIUM SERPL-SCNC: 3.8 MMOL/L (ref 3.5–5.2)
PROT SERPL-MCNC: 7.3 G/DL (ref 6–8.5)
RBC # BLD AUTO: 4.36 10*6/MM3 (ref 4.14–5.8)
SODIUM SERPL-SCNC: 139 MMOL/L (ref 136–145)
WBC NRBC COR # BLD AUTO: 10.92 10*3/MM3 (ref 3.4–10.8)
WHOLE BLOOD HOLD SPECIMEN: NORMAL

## 2025-02-07 PROCEDURE — 80053 COMPREHEN METABOLIC PANEL: CPT

## 2025-02-07 PROCEDURE — 93970 EXTREMITY STUDY: CPT

## 2025-02-07 PROCEDURE — 85025 COMPLETE CBC W/AUTO DIFF WBC: CPT

## 2025-02-07 PROCEDURE — 97161 PT EVAL LOW COMPLEX 20 MIN: CPT | Performed by: PHYSICAL THERAPIST

## 2025-02-07 PROCEDURE — 97110 THERAPEUTIC EXERCISES: CPT | Performed by: PHYSICAL THERAPIST

## 2025-02-07 PROCEDURE — 36415 COLL VENOUS BLD VENIPUNCTURE: CPT

## 2025-02-07 PROCEDURE — 93970 EXTREMITY STUDY: CPT | Performed by: SURGERY

## 2025-02-07 PROCEDURE — 83880 ASSAY OF NATRIURETIC PEPTIDE: CPT

## 2025-02-07 PROCEDURE — 99284 EMERGENCY DEPT VISIT MOD MDM: CPT

## 2025-02-07 NOTE — DISCHARGE INSTRUCTIONS
Your x-ray completed at urgent care shows that you have some fluid in the knee and arthritis changes.  Your ultrasound completed in the emergency department today are negative for any blood clot.  Your lab work does not show any abnormalities concerning for causing your leg swelling.  If you are at home please try to keep legs elevated to improve swelling.  Take the Voltaren as prescribed to improve your pain.  Schedule follow-up appointment with your primary care provider in 1 week.  Return to the emergency department for new or worsening symptoms.  
I personally performed the service described in the documentation recorded by the scribe in my presence, and it accurately and completely records my words and actions.

## 2025-02-07 NOTE — ED PROVIDER NOTES
Time: 1:43 PM EST  Date of encounter:  2/7/2025  Independent Historian/Clinical History and Information was obtained by:   Patient and Family    History is limited by: N/A    Chief Complaint: Knee pain      History of Present Illness:  Patient is a 84 y.o. year old male who presents to the emergency department for evaluation of knee pain.  Patient presents the emergency department today complaining of left knee pain has been ongoing since he woke on Wednesday.  He denies any fall or injury.  Patient states he has been having to utilize a cane to help him walk which she does not normally do.  Patient states that he went to urgent care today and they sent him here to rule out a blood clot because he was told that he had swelling in his left calf compared to the right.  Patient denies any history of DVT or PE.  Patient Nuys chest pain or shortness of breath.  On physical exam patient has bilateral lower extremity swelling but he states that he only feels the left swollen.  Patient denies history of CKD or CHF.      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    Hypogonadism, testicular     Mixed hyperlipidemia 7/13/2021    Paroxysmal atrial fibrillation 7/3/2021    Seizure     once per wife butbwas diagnoised with afib     Past Surgical History:   Procedure Laterality Date    COLONOSCOPY      15 YEARS AGO     MOLE REMOVAL  04/28/2014 5/1/14-HAS NOT REC'D PATHOLOGY RESULTS YET    THYROIDECTOMY, PARTIAL      TOTAL KNEE ARTHROPLASTY Right      Family History   Problem Relation Age of Onset    Clotting disorder Mother     Hypertension Mother     Emphysema Father        Home Medications:  Prior to Admission medications     Medication Sig Start Date End Date Taking? Authorizing Provider   alendronate (FOSAMAX) 70 MG tablet Take 1 tablet by mouth Every 7 (Seven) Days. SUNDAY 4/16/24   Ok Moore DO   aspirin 81 MG EC tablet Take 1 tablet by mouth Daily. 4/16/24   Ok Moore DO   atorvastatin (LIPITOR) 10 MG tablet Take 1 tablet by mouth Daily. 4/16/24   Ok Moore DO   Calcium Carbonate-Vitamin D 600-10 MG-MCG per tablet Take 2 tablets by mouth Daily. 4/16/24   Ok Moore DO   cetirizine (zyrTEC) 10 MG tablet Take 0.5 tablets by mouth Daily. 4/16/24   Ok Moore DO   cyanocobalamin (VITAMIN B-12) 1000 MCG tablet Take 1 tablet by mouth Daily. 4/16/24   Ok Moore DO   Eliquis 5 MG tablet tablet Take 1 tablet by mouth 2 (Two) Times a Day. 4/16/24   Ok Moore DO   finasteride (PROSCAR) 5 MG tablet Take 1 tablet by mouth Daily. 4/16/24   Ok Moore DO   fluticasone (FLONASE) 50 MCG/ACT nasal spray 2 sprays into the nostril(s) as directed by provider Daily. 4/16/24   Ok Moore DO   gabapentin (NEURONTIN) 600 MG tablet Take 1 tablet by mouth 3 (Three) Times a Day. 12/23/24   Ok Moore DO   hydroCHLOROthiazide 12.5 MG tablet Take 1 tablet by mouth Daily. 4/16/24   Ok Moore DO   lisinopril (PRINIVIL,ZESTRIL) 20 MG tablet Take 1 tablet by mouth Daily. 4/16/24   Ok Moore DO   metFORMIN (GLUCOPHAGE) 1000 MG tablet Take 1 tablet by mouth 2 (Two) Times a Day With Meals. 4/16/24   Ok Moore DO   metoprolol succinate XL (TOPROL-XL) 25 MG 24 hr tablet Take 1 tablet by mouth 2 (Two) Times a Day. 4/16/24   Ok Moore DO   raNITIdine HCl (ZANTAC 150 MAXIMUM STRENGTH PO) Take 1 tablet by mouth 2 (Two) Times a Day.    Provider, MD Brianne   tamsulosin (FLOMAX) 0.4 MG capsule 24 hr capsule Take 1 capsule by mouth Every Night. 4/16/24   Ok Moore DO   Testosterone (AndroGel Pump) 20.25 MG/ACT (1.62%) gel 40.5 mg applied once  "daily in the morning to the shoulders and upper arms 25   Ok Moore DO        Social History:   Social History     Tobacco Use    Smoking status: Former     Current packs/day: 0.00     Average packs/day: 2.0 packs/day for 20.0 years (40.0 ttl pk-yrs)     Types: Cigarettes     Start date:      Quit date: 1981     Years since quittin.1     Passive exposure: Past    Smokeless tobacco: Never    Tobacco comments:     14-QUIT 25-30 YEARS AGO   Vaping Use    Vaping status: Never Used   Substance Use Topics    Alcohol use: Not Currently    Drug use: Never         Review of Systems:  Review of Systems   Constitutional:  Negative for fever.   Respiratory:  Negative for shortness of breath.    Cardiovascular:  Positive for leg swelling. Negative for chest pain.   Musculoskeletal:  Positive for arthralgias.        Physical Exam:  /55   Pulse 75   Temp 97.6 °F (36.4 °C) (Oral)   Resp 16   Ht 167.6 cm (66\")   Wt 93 kg (205 lb 0.4 oz)   SpO2 100%   BMI 33.09 kg/m²     Physical Exam  Vitals and nursing note reviewed.   Constitutional:       Appearance: Normal appearance. He is normal weight.   HENT:      Head: Normocephalic and atraumatic.      Nose: Nose normal.   Eyes:      Conjunctiva/sclera: Conjunctivae normal.   Cardiovascular:      Rate and Rhythm: Normal rate and regular rhythm.      Heart sounds: Normal heart sounds.   Pulmonary:      Effort: Pulmonary effort is normal.      Breath sounds: Normal breath sounds.   Abdominal:      General: Abdomen is flat. There is no distension.   Musculoskeletal:         General: Normal range of motion.      Cervical back: Normal range of motion and neck supple.      Right knee: Normal.      Left knee: Swelling present. No deformity, effusion, erythema, ecchymosis, lacerations or bony tenderness. Decreased range of motion. Tenderness present.      Right lower le+ Pitting Edema present.      Left lower le+ Pitting Edema present.      Right " foot: Swelling present. Normal pulse.      Left foot: Swelling present. Normal pulse.   Skin:     General: Skin is warm and dry.   Neurological:      General: No focal deficit present.      Mental Status: He is alert and oriented to person, place, and time.   Psychiatric:         Mood and Affect: Mood normal.         Behavior: Behavior normal.         Thought Content: Thought content normal.         Judgment: Judgment normal.                  Medical Decision Making:    Comorbidities that affect care:    Diabetes mellitus, atrial fibrillation, hyperlipidemia, hypertension, BPH    External Notes reviewed:    Previous Clinic Note: Urgent care visit from today for same complaint      The following orders were placed and all results were independently analyzed by me:  Orders Placed This Encounter   Procedures    Comprehensive Metabolic Panel    [place] Draw    BNP    CBC Auto Differential    PT Consult: Eval & Treat Functional Mobility Below Baseline    PT Plan of Care Cert / Re-Cert    CBC & Differential    Green Top (Gel)    Lavender Top    Gold Top - SST    Light Blue Top    Extra Tubes    Green Top (Gel)    Juarez Top       Medications Given in the Emergency Department:  Medications - No data to display     ED Course:    ED Course as of 02/07/25 1531   Fri Feb 07, 2025   1448 Vascular ultrasound reported patient is negative for DVT bilaterally [MD]   1449 Discussed patient with ED physical therapist who will evaluate patient [MD]      ED Course User Index  [MD] Glenn Guerrero, PAREBA       Labs:    Lab Results (last 24 hours)       Procedure Component Value Units Date/Time    CBC & Differential [[id number]]  (Abnormal) Collected: 02/07/25 1451    Specimen: Blood Updated: 02/07/25 1459    Narrative:      The following orders were created for panel order CBC & Differential.  Procedure                               Abnormality         Status                     ---------                               -----------          ------                     CBC Auto Differential[[id number]]        Abnormal            Final result                 Please view results for these tests on the individual orders.    Comprehensive Metabolic Panel [[id number]]  (Abnormal) Collected: 02/07/25 1451    Specimen: Blood Updated: 02/07/25 1525     Glucose 106 mg/dL      BUN 16 mg/dL      Creatinine 1.00 mg/dL      Sodium 139 mmol/L      Potassium 3.8 mmol/L      Chloride 101 mmol/L      CO2 27.5 mmol/L      Calcium 10.4 mg/dL      Total Protein 7.3 g/dL      Albumin 3.8 g/dL      ALT (SGPT) 13 U/L      AST (SGOT) 12 U/L      Alkaline Phosphatase 71 U/L      Total Bilirubin 1.5 mg/dL      Globulin 3.5 gm/dL      A/G Ratio 1.1 g/dL      BUN/Creatinine Ratio 16.0     Anion Gap 10.5 mmol/L      eGFR 75.1 mL/min/1.73     Narrative:      GFR Categories in Chronic Kidney Disease (CKD)      GFR Category          GFR (mL/min/1.73)    Interpretation  G1                     90 or greater         Normal or high (1)  G2                      60-89                Mild decrease (1)  G3a                   45-59                Mild to moderate decrease  G3b                   30-44                Moderate to severe decrease  G4                    15-29                Severe decrease  G5                    14 or less           Kidney failure          (1)In the absence of evidence of kidney disease, neither GFR category G1 or G2 fulfill the criteria for CKD.    eGFR calculation 2021 CKD-EPI creatinine equation, which does not include race as a factor    BNP [[id number]]  (Normal) Collected: 02/07/25 1451    Specimen: Blood Updated: 02/07/25 1520     proBNP 533.9 pg/mL     Narrative:      This assay is used as an aid in the diagnosis of individuals suspected of having heart failure. It can be used as an aid in the diagnosis of acute decompensated heart failure (ADHF) in patients presenting with signs and symptoms of ADHF to the emergency department (ED). In addition, NT-proBNP of  <300 pg/mL indicates ADHF is not likely.    Age Range Result Interpretation  NT-proBNP Concentration (pg/mL:      <50             Positive            >450                   Gray                 300-450                    Negative             <300    50-75           Positive            >900                  Gray                300-900                  Negative            <300      >75             Positive            >1800                  Gray                300-1800                  Negative            <300    CBC Auto Differential [[id number]]  (Abnormal) Collected: 02/07/25 1451    Specimen: Blood Updated: 02/07/25 1459     WBC 10.92 10*3/mm3      RBC 4.36 10*6/mm3      Hemoglobin 14.4 g/dL      Hematocrit 41.8 %      MCV 95.9 fL      MCH 33.0 pg      MCHC 34.4 g/dL      RDW 12.2 %      RDW-SD 43.1 fl      MPV 10.7 fL      Platelets 188 10*3/mm3      Neutrophil % 68.6 %      Lymphocyte % 15.8 %      Monocyte % 11.5 %      Eosinophil % 3.2 %      Basophil % 0.6 %      Immature Grans % 0.3 %      Neutrophils, Absolute 7.48 10*3/mm3      Lymphocytes, Absolute 1.73 10*3/mm3      Monocytes, Absolute 1.26 10*3/mm3      Eosinophils, Absolute 0.35 10*3/mm3      Basophils, Absolute 0.07 10*3/mm3      Immature Grans, Absolute 0.03 10*3/mm3      nRBC 0.0 /100 WBC              Imaging:    XR Knee 3 View Left    Result Date: 2/7/2025  XR KNEE 3 VW LEFT Date of Exam: 2/7/2025 12:06 PM EST Indication: Unable to bear weight, pain in knee, no injury Comparison: None available. Findings: There is no acute fracture or dislocation. There is mild tricompartmental degenerative spurring. There is medial compartment joint space narrowing. Possible suprapatellar joint effusion although lateral positioning is suboptimal.     Impression: 1. No acute fracture or dislocation. 2. Mild tricompartmental degenerative joint disease. 3. Possible suprapatellar joint effusion with suboptimal lateral view positioning. Electronically Signed: Gabino  Gisel  2/7/2025 12:29 PM EST  Workstation ID: [id number]       Differential Diagnosis and Discussion:    Extremity Pain: Differential diagnosis includes but is not limited to soft tissue sprain, tendonitis, tendon injury, dislocation, fracture, deep vein thrombosis, arterial insufficiency, osteoarthritis, bursitis, and ligamentous damage.    PROCEDURES:    Labs were collected in the emergency department and all labs were reviewed and interpreted by me.  Ultrasound was performed in the emergency department and the ultrasound impression was interpreted by me.     No orders to display       Procedures    MDM  Number of Diagnoses or Management Options  Bilateral leg edema  Effusion of left knee  Diagnosis management comments: Patient presented to the emergency department today for evaluation of left knee pain and leg swelling.  Patient had left knee x-ray completed at urgent care that noted arthritic changes and joint effusion.  CBC notes white blood cell count 10.9 with normal hemoglobin hematocrit.  CMP notes glucose 106 with total bilirubin of 1.5.  BNP negative.  Duplex ultrasound of bilateral lower extremities was completed and negative for DVT.  Patient was evaluated by ED physical therapist who provided strengthening exercises to complete at home.  Will provide patient with Voltaren gel to apply to the knee to improve pain and have follow-up with PCP in 1 week.  Return to the emergency department guidelines provided.       Amount and/or Complexity of Data Reviewed  Clinical lab tests: reviewed and ordered    Risk of Complications, Morbidity, and/or Mortality  Presenting problems: moderate  Diagnostic procedures: low  Management options: low    Patient Progress  Patient progress: stable       Patient Care Considerations:    NARCOTICS: I considered prescribing opiate pain medication as an outpatient, however there is no fracture or injury      Consultants/Shared Management Plan:    Patient evaluated by ED  physical therapist to provide strengthening exercises to complete at home    Social Determinants of Health:    Patient is independent, reliable, and has access to care.       Disposition and Care Coordination:    Discharged: The patient is suitable and stable for discharge with no need for consideration of admission.    I have explained the patient´s condition, diagnoses and treatment plan based on the information available to me at this time. I have answered questions and addressed any concerns. The patient has a good  understanding of the patient´s diagnosis, condition, and treatment plan as can be expected at this point. The vital signs have been stable. The patient´s condition is stable and appropriate for discharge from the emergency department.      The patient will pursue further outpatient evaluation with the primary care physician or other designated or consulting physician as outlined in the discharge instructions. They are agreeable to this plan of care and follow-up instructions have been explained in detail. The patient has received these instructions in written format and has expressed an understanding of the discharge instructions. The patient is aware that any significant change in condition or worsening of symptoms should prompt an immediate return to this or the closest emergency department or call to 911.  I have explained discharge medications and the need for follow up with the patient/caretakers. This was also printed in the discharge instructions. Patient was discharged with the following medications and follow up:      Medication List        New Prescriptions      Diclofenac Sodium 1 % gel gel  Commonly known as: Voltaren  Apply 4 g topically to the appropriate area as directed 4 (Four) Times a Day As Needed (moderate pain).               Where to Get Your Medications        These medications were sent to [facility] [place], [address] [phone]  -  [phone] FX  [address] [address]      Phone: [phone]   Diclofenac Sodium 1 % gel gel      Ok Moore, DO  [address] [facility] [address]  [place] [id number] [phone]    Schedule an appointment as soon as possible for a visit in 1 week         Final diagnoses:   Effusion of left knee   Bilateral leg edema        ED Disposition       ED Disposition   Discharge    Condition   Stable    Comment   --               This medical record created using voice recognition software.             Glenn Guerrero PA-C  02/07/25 1530

## 2025-02-07 NOTE — TELEPHONE ENCOUNTER
Phone call received from patient 's spouse stating that the patient had hurt his left knee and it is swelling. Spouse stated that she had him in a chair with it elevated and ice on it at present. Attempted to make an appointment with a provider today with no openings at all. Instructed wife to secure knee with an ace wrap and take him to urgent care for an xray with a followup visit scheduled for next Tuesday with his PCP. Wife voiced appreciation.

## 2025-02-07 NOTE — THERAPY EVALUATION
Patient Name: Steve Bryson Jr.  : 1940    MRN: [id number]                              Today's Date: 2025       Admit Date: 2025    Visit Dx:     ICD-10-CM ICD-9-CM   1. Acute pain of left knee  M25.562 719.46     Patient Active Problem List   Diagnosis    Compression fracture of thoracic vertebra with delayed healing    Compression fracture of lumbar vertebra with routine healing    Bone lesion    Paroxysmal atrial fibrillation    Essential hypertension    Mixed hyperlipidemia    Allergic rhinitis    Benign neoplasm of thyroid gland    BPH with urinary obstruction    Other specified disorders of male genital organs    Other testicular hypofunction    Type 2 diabetes mellitus    Hypogonadism in male     Past Medical History:   Diagnosis Date    Arrhythmia     BPH with urinary obstruction 2014    Diabetes mellitus     Epididymal mass     Essential hypertension 2021    Follicular adenoma of thyroid gland 2021    RIGHT    Hypogonadism, testicular     Mixed hyperlipidemia 2021    Paroxysmal atrial fibrillation 7/3/2021    Seizure     once per wife butbwas diagnoised with afib     Past Surgical History:   Procedure Laterality Date    COLONOSCOPY      15 YEARS AGO     MOLE REMOVAL  2014-HAS NOT REC'D PATHOLOGY RESULTS YET    THYROIDECTOMY, PARTIAL      TOTAL KNEE ARTHROPLASTY Right       General Information       Row Name 25 1519          Physical Therapy Time and Intention    Document Type evaluation  -LR     Mode of Treatment individual therapy  -LR       Row Name 25 1519          General Information    Patient Profile Reviewed yes  -LR     Prior Level of Function independent:  -LR               User Key  (r) = Recorded By, (t) = Taken By, (c) = Cosigned By      Initials Name Provider Type    LR Kath Harden, PT Physical Therapist                  History: Pt reports on Wednesday he started having pain in his L knee with no known injury.  He  denies previous history of left knee pain.  He states his pain is a 2/10 at rest and 8/10 with movement.  He has not taken any medicine or iced his knee.  He states it has been swollen.  He has been using a cane for ambulation but is having a hard time with walking due to the pain.  He does not normally use an assistive device for ambulation.    Objective:  Edema: Diffuse swelling surrounding left knee    Palpation: Tenderness surrounding entire left knee joint    ROM:  Active Knee ROM:  L Knee AROM:  R Knee AROM:  Flexion: 55°   Flexion: NT  Extension: -5°   Extension: NT    Passive Knee ROM:  L Knee PROM:  R Knee PROM:  Flexion: 55°   Flexion: NT  Extension: 0°   Extension: NT    Patient is very guarded during passive movement of left knee     Strength:  L Hip MMT:    R Hip MMT:  Flexion: 4+   flexion: NT    L Knee MMT:   R Knee MMT:  Flexion: 4   Flexion: NT  Extension: 5   Extension: NT    L Ankle MMT:   R Ankle MMT:  DF: 5    DF: NT  PF: 5    PF: NT     Special Tests:  Valgus Stress Test: Negative on left  Varus Stress Test: Negative on left  Chaz Test: Unable to perform due to muscle  Patellar Apprehension Test: NT  Anterior Drawer Test: NT  Posterior Drawer Test: NT  Lachman Test: Negative on left     Sensation: Left LE sensation intact to light touch    Assessment/Plan:   Pt presents with a diagnosis of left knee pain and has significantly decreased knee range of motion that are limiting his ability to stand and walk.  The patient was educated on multiple exercises to perform at home to improve knee mobility and strength.  He was provided with HEP handout.  He was also instructed to begin ambulating with a rolling walker until pain and mobility improve to avoid falling.  Patient has a rolling walker at home that he is able to use.    Goals:   LTG 1: The patient will be independent in HEP in order to decrease pain and improve tolerance to functional activities.  STATUS: Met    Interventions:   Manual  Therapy: Not    Therapeutic Exercises: HEP: Quad set (5 X5 seconds), heel slide with sheet (5 X5 seconds), long sitting hamstring stretch, seated gastroc stretch with towel (2X 20 seconds), SAQ (5X 3 seconds), passive knee extension stretch on towel roll    Modalities: Not performed     Outcome Measures       Row Name 02/07/25 1519          Optimal Instrument    Optimal Instrument Optimal - 3  -LR     Moving - lying to sitting 3  -LR     Standing 3  -LR     Walking - short distance 4  -LR     From the list, choose the 3 activities you would most like to be able to do without any difficulty Standing;Walking -short distance;Moving -lying to sitting  -LR     Total Score Optimal - 3 7  -LR       Row Name 02/07/25 1519          Functional Assessment    Outcome Measure Options Optimal Instrument  -LR               User Key  (r) = Recorded By, (t) = Taken By, (c) = Cosigned By      Initials Name Provider Type    Kath Lincoln, PT Physical Therapist                       Time Calculation:   PT Evaluation Complexity  History, PT Evaluation Complexity: 3 or more personal factors and/or comorbidities  Examination of Body Systems (PT Eval Complexity): 1-2 elements  Clinical Presentation (PT Evaluation Complexity): stable  Clinical Decision Making (PT Evaluation Complexity): low complexity  Overall Complexity (PT Evaluation Complexity): low complexity     PT Charges       Row Name 02/07/25 1520             Timed Charges    40050 - PT Therapeutic Exercise Minutes 10  -LR         Untimed Charges    PT Eval/Re-eval Minutes 17  -LR         Total Minutes    Timed Charges Total Minutes 10  -LR      Untimed Charges Total Minutes 17  -LR       Total Minutes 27  -LR                User Key  (r) = Recorded By, (t) = Taken By, (c) = Cosigned By      Initials Name Provider Type    Kath Lincoln, PT Physical Therapist                  Therapy Charges for Today       Code Description Service Date Service Provider Modifiers Qty     [id number] HC PT THER PROC EA 15 MIN 2/7/2025 Kath Harden, PT GP 1    [id number] HC PT EVAL LOW COMPLEXITY 2 2/7/2025 Kath Harden, PT GP 1            PT G-Codes  Outcome Measure Options: Optimal Instrument       Kath Harden, PT  2/7/2025

## 2025-02-11 ENCOUNTER — OFFICE VISIT (OUTPATIENT)
Dept: FAMILY MEDICINE CLINIC | Facility: CLINIC | Age: 85
End: 2025-02-11
Payer: MEDICARE

## 2025-02-11 VITALS
DIASTOLIC BLOOD PRESSURE: 70 MMHG | HEART RATE: 66 BPM | BODY MASS INDEX: 34.06 KG/M2 | SYSTOLIC BLOOD PRESSURE: 122 MMHG | HEIGHT: 66 IN | OXYGEN SATURATION: 94 % | WEIGHT: 211.9 LBS | TEMPERATURE: 97.7 F

## 2025-02-11 DIAGNOSIS — I38 VALVULAR INCOMPETENCE: ICD-10-CM

## 2025-02-11 DIAGNOSIS — M25.562 ACUTE PAIN OF LEFT KNEE: Primary | ICD-10-CM

## 2025-02-11 DIAGNOSIS — R60.0 BILATERAL LOWER EXTREMITY EDEMA: ICD-10-CM

## 2025-02-11 PROCEDURE — 3078F DIAST BP <80 MM HG: CPT | Performed by: FAMILY MEDICINE

## 2025-02-11 PROCEDURE — 1125F AMNT PAIN NOTED PAIN PRSNT: CPT | Performed by: FAMILY MEDICINE

## 2025-02-11 PROCEDURE — 99213 OFFICE O/P EST LOW 20 MIN: CPT | Performed by: FAMILY MEDICINE

## 2025-02-11 PROCEDURE — 3074F SYST BP LT 130 MM HG: CPT | Performed by: FAMILY MEDICINE

## 2025-02-11 NOTE — PROGRESS NOTES
Chief Complaint  Left knee pain      Subjective          Steve Gadiel Bryson Jr. presents to [facility]    History of Present Illness     History of Present Illness    Patient presents today accompanied by his wife for a ER follow-up appointment.  He was seen there recently given concerns of sudden onset left knee pain that began Wednesday last week.  He was seen in urgent care first and was concerned about possible DVT so he was evaluated in the ER.  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  The left knee demonstrated mild tricompartmental degenerative joint changes with possible suprapatellar joint effusion with suboptimal lateral view positioning.  He presents today for further evaluation.  He was given Voltaren gel which was not helpful.  He is not interested in taking anything stronger at this time such as tramadol or an opioid.  He would like further evaluation so we did discuss getting an MRI for further evaluation.  Also plan to fit him for a knee brace today.  As far as the valvular incompetence is concerned I did discuss with him using compression stockings.  He is not interested in referral to vascular at this time.       Current Outpatient Medications   Medication Instructions    alendronate (FOSAMAX) 70 mg, Oral, Every 7 Days, SUNDAY    aspirin 81 mg, Oral, Daily    atorvastatin (LIPITOR) 10 mg, Oral, Daily    Calcium Carbonate-Vitamin D 600-10 MG-MCG per tablet 2 tablets, Oral, Daily    cetirizine (ZYRTEC) 5 mg, Oral, Daily    cyanocobalamin (VITAMIN B-12) 1,000 mcg, Oral, Daily    Diclofenac Sodium (VOLTAREN) 4 g, Topical, 4 Times Daily PRN    Eliquis 5 mg, Oral, 2 Times Daily    finasteride (PROSCAR) 5 mg, Oral, Daily    fluticasone (FLONASE) 50 MCG/ACT nasal spray 2 sprays, Nasal, Daily    gabapentin (NEURONTIN) 600 mg, Oral, 3 Times Daily    hydroCHLOROthiazide  "12.5 mg, Oral, Daily    lisinopril (PRINIVIL,ZESTRIL) 20 mg, Oral, Daily    metFORMIN (GLUCOPHAGE) 1,000 mg, Oral, 2 Times Daily With Meals    metoprolol succinate XL (TOPROL-XL) 25 mg, Oral, 2 Times Daily    raNITIdine HCl (ZANTAC 150 MAXIMUM STRENGTH PO) 1 tablet, 2 Times Daily    tamsulosin (FLOMAX) 0.4 mg, Oral, Nightly    Testosterone (AndroGel Pump) 20.25 MG/ACT (1.62%) gel 40.5 mg applied once daily in the morning to the shoulders and upper arms       The following portions of the patient's history were reviewed and updated as appropriate: allergies, current medications, past family history, past medical history, past social history, past surgical history, and problem list.    Objective   Vital Signs:   /70   Pulse 66   Temp 97.7 °F (36.5 °C) (Oral)   Ht 167.6 cm (66\")   Wt 96.1 kg (211 lb 14.4 oz)   SpO2 94%   BMI 34.20 kg/m²     BP Readings from Last 3 Encounters:   02/11/25 122/70   02/07/25 128/55   02/07/25 126/94     Wt Readings from Last 3 Encounters:   02/11/25 96.1 kg (211 lb 14.4 oz)   02/07/25 93 kg (205 lb 0.4 oz)   02/07/25 93 kg (205 lb)           Physical Exam  Vitals reviewed.   Constitutional:       Appearance: Normal appearance.   HENT:      Head: Normocephalic and atraumatic.      Right Ear: External ear normal.      Left Ear: External ear normal.   Eyes:      Conjunctiva/sclera: Conjunctivae normal.   Cardiovascular:      Rate and Rhythm: Normal rate and regular rhythm.      Heart sounds: No murmur heard.     No friction rub. No gallop.   Pulmonary:      Effort: Pulmonary effort is normal.      Breath sounds: Normal breath sounds. No wheezing or rhonchi.   Abdominal:      General: Bowel sounds are normal. There is no distension.      Palpations: Abdomen is soft.      Tenderness: There is no abdominal tenderness.   Musculoskeletal:      Right lower leg: Edema present.      Left lower leg: Edema present.      Comments: Left knee demonstrates effusion with negative valgus and " varus stress testing, negative Chaz, negative anterior and posterior drawer testing.   Skin:     General: Skin is warm and dry.   Neurological:      Mental Status: He is alert and oriented to person, place, and time.      Cranial Nerves: No cranial nerve deficit.   Psychiatric:         Mood and Affect: Mood and affect normal.         Behavior: Behavior normal.         Thought Content: Thought content normal.         Judgment: Judgment normal.            Result Review :   The following data was reviewed by: Ok Moore DO on 02/11/2025:  Common labs          4/23/2024    09:00 4/23/2024    11:29 12/3/2024    09:33 2/7/2025    14:51   Common Labs   Glucose 114   109  106    BUN 15   15  16    Creatinine 1.18   1.10  1.00    Sodium 141   139  139    Potassium 4.6   4.3  3.8    Chloride 105   103  101    Calcium 10.3   10.4  10.4    Albumin 3.9   4.1  3.8    Total Bilirubin 0.6   0.8  1.5    Alkaline Phosphatase 69   82  71    AST (SGOT) 18   17  12    ALT (SGPT) 23   22  13    WBC  7.53  6.72  10.92    Hemoglobin  15.0  15.3  14.4    Hematocrit  43.5  43.2  41.8    Platelets  208  200  188    Total Cholesterol 116   118     Triglycerides 240   168     HDL Cholesterol 27   27     LDL Cholesterol  51   62     Hemoglobin A1C 5.80   5.60     PSA 2.510                Lab Results   Component Value Date    COVID19 Not Detected 08/16/2021    INR 1.03 (L) 08/16/2021    BILIRUBINUR Negative 07/02/2021       Results      Procedures        Assessment and Plan    Diagnoses and all orders for this visit:    1. Acute pain of left knee (Primary)  -     MRI Knee Left Without Contrast; Future    2. Valvular incompetence    3. Bilateral lower extremity edema      Plan as documented above.  I discussed with patient getting an MRI for further evaluation.  If he has not heard from us this week he is instructed to call as far as scheduling goes.  I plan to see him back for his next regular scheduled appointment or sooner if needed.   Patient struck to call with any questions or concerns.  Assessment & Plan         There are no discontinued medications.       Follow Up   Return if symptoms worsen or fail to improve.  Patient was given instructions and counseling regarding his condition or for health maintenance advice. Please see specific information pulled into the AVS if appropriate.     Patient or patient representative verbalized consent for the use of Ambient Listening during the visit with  Ok Moore DO for chart documentation. 2/11/2025  09:00 EST    Ok Moore DO  02/11/25  09:40 EST

## 2025-03-25 ENCOUNTER — OFFICE VISIT (OUTPATIENT)
Dept: FAMILY MEDICINE CLINIC | Facility: CLINIC | Age: 85
End: 2025-03-25
Payer: MEDICARE

## 2025-03-25 VITALS
BODY MASS INDEX: 33.41 KG/M2 | WEIGHT: 207.9 LBS | HEIGHT: 66 IN | OXYGEN SATURATION: 97 % | TEMPERATURE: 97.7 F | SYSTOLIC BLOOD PRESSURE: 126 MMHG | HEART RATE: 85 BPM | DIASTOLIC BLOOD PRESSURE: 74 MMHG

## 2025-03-25 DIAGNOSIS — E78.2 MIXED HYPERLIPIDEMIA: ICD-10-CM

## 2025-03-25 DIAGNOSIS — E11.65 TYPE 2 DIABETES MELLITUS WITH HYPERGLYCEMIA, WITHOUT LONG-TERM CURRENT USE OF INSULIN: ICD-10-CM

## 2025-03-25 DIAGNOSIS — Z51.81 MEDICATION MONITORING ENCOUNTER: ICD-10-CM

## 2025-03-25 DIAGNOSIS — G89.29 CHRONIC BILATERAL LOW BACK PAIN WITHOUT SCIATICA: ICD-10-CM

## 2025-03-25 DIAGNOSIS — R60.0 BILATERAL LOWER EXTREMITY EDEMA: ICD-10-CM

## 2025-03-25 DIAGNOSIS — E53.8 B12 DEFICIENCY: ICD-10-CM

## 2025-03-25 DIAGNOSIS — E29.1 HYPOGONADISM IN MALE: Primary | ICD-10-CM

## 2025-03-25 DIAGNOSIS — Z12.5 SCREENING FOR PROSTATE CANCER: ICD-10-CM

## 2025-03-25 DIAGNOSIS — N42.9 DISORDER OF PROSTATE, UNSPECIFIED: ICD-10-CM

## 2025-03-25 DIAGNOSIS — E55.9 VITAMIN D DEFICIENCY: ICD-10-CM

## 2025-03-25 DIAGNOSIS — I10 ESSENTIAL HYPERTENSION: ICD-10-CM

## 2025-03-25 DIAGNOSIS — I38 VALVULAR INCOMPETENCE: ICD-10-CM

## 2025-03-25 DIAGNOSIS — M54.50 CHRONIC BILATERAL LOW BACK PAIN WITHOUT SCIATICA: ICD-10-CM

## 2025-03-25 DIAGNOSIS — M25.562 ACUTE PAIN OF LEFT KNEE: ICD-10-CM

## 2025-03-25 RX ORDER — TAMSULOSIN HYDROCHLORIDE 0.4 MG/1
1 CAPSULE ORAL NIGHTLY
Qty: 90 CAPSULE | Refills: 3 | Status: SHIPPED | OUTPATIENT
Start: 2025-03-25

## 2025-03-25 RX ORDER — TESTOSTERONE 1.62 MG/G
GEL TRANSDERMAL
Qty: 75 G | Refills: 2 | Status: SHIPPED | OUTPATIENT
Start: 2025-03-25

## 2025-03-25 RX ORDER — LISINOPRIL 20 MG/1
20 TABLET ORAL DAILY
Qty: 90 TABLET | Refills: 3 | Status: SHIPPED | OUTPATIENT
Start: 2025-03-25

## 2025-03-25 RX ORDER — GABAPENTIN 600 MG/1
600 TABLET ORAL 3 TIMES DAILY
Qty: 90 TABLET | Refills: 2 | Status: SHIPPED | OUTPATIENT
Start: 2025-03-25

## 2025-03-25 RX ORDER — FINASTERIDE 5 MG/1
5 TABLET, FILM COATED ORAL DAILY
Qty: 90 TABLET | Refills: 3 | Status: SHIPPED | OUTPATIENT
Start: 2025-03-25

## 2025-03-25 RX ORDER — HYDROCHLOROTHIAZIDE 12.5 MG/1
12.5 TABLET ORAL DAILY
Qty: 90 TABLET | Refills: 3 | Status: SHIPPED | OUTPATIENT
Start: 2025-03-25

## 2025-03-25 RX ORDER — METOPROLOL SUCCINATE 25 MG/1
25 TABLET, EXTENDED RELEASE ORAL 2 TIMES DAILY
Qty: 180 TABLET | Refills: 3 | Status: SHIPPED | OUTPATIENT
Start: 2025-03-25

## 2025-03-25 RX ORDER — APIXABAN 5 MG/1
5 TABLET, FILM COATED ORAL 2 TIMES DAILY
Qty: 180 TABLET | Refills: 3 | Status: SHIPPED | OUTPATIENT
Start: 2025-03-25

## 2025-03-25 RX ORDER — FUROSEMIDE 20 MG/1
20 TABLET ORAL DAILY
Qty: 10 TABLET | Refills: 0 | Status: SHIPPED | OUTPATIENT
Start: 2025-03-25 | End: 2025-04-04

## 2025-03-25 RX ORDER — ASPIRIN 81 MG/1
81 TABLET ORAL DAILY
Qty: 90 TABLET | Refills: 3 | Status: SHIPPED | OUTPATIENT
Start: 2025-03-25

## 2025-03-25 RX ORDER — ATORVASTATIN CALCIUM 10 MG/1
10 TABLET, FILM COATED ORAL DAILY
Qty: 90 TABLET | Refills: 3 | Status: SHIPPED | OUTPATIENT
Start: 2025-03-25

## 2025-03-25 RX ORDER — CETIRIZINE HYDROCHLORIDE 10 MG/1
5 TABLET ORAL DAILY
Qty: 45 TABLET | Refills: 3 | Status: SHIPPED | OUTPATIENT
Start: 2025-03-25

## 2025-03-25 RX ORDER — FLUTICASONE PROPIONATE 50 MCG
2 SPRAY, SUSPENSION (ML) NASAL DAILY
Qty: 48 G | Refills: 3 | Status: SHIPPED | OUTPATIENT
Start: 2025-03-25

## 2025-03-25 RX ORDER — ALENDRONATE SODIUM 70 MG/1
70 TABLET ORAL
Qty: 13 TABLET | Refills: 3 | Status: SHIPPED | OUTPATIENT
Start: 2025-03-25

## 2025-03-25 NOTE — PROGRESS NOTES
Chief Complaint  Follow-up and Hypertension    Subjective          Steve Bryson Jr. presents to [facility]         History of Present Illness  The patient is an 84-year-old male who presents today for a follow-up appointment. He is accompanied by his wife.    He reports persistent pain in his left knee, which was severe enough to impede his mobility on Sunday. However, he notes some improvement in his condition today. He has been utilizing a knee brace for support but finds it challenging to wear while entering and exiting vehicles. He is not currently taking any analgesics for the pain and expresses a desire to avoid additional medications until after his scheduled MRI next week.    He also reports edema in his right leg. He has not been using compression stockings due to sizing issues.    He is on gabapentin 600 mg 3 times a day for chronic low back pain.    He is on testosterone 40.5 mg applied once in the morning to the arms and shoulders for hypogonadism.    He is on atorvastatin daily for cholesterol management.    He is on vitamin B12 and vitamin D supplements.    He is on hydrochlorothiazide 12.5 mg daily, metoprolol 25 mg twice a day, and lisinopril 20 mg daily for blood pressure management.    He is on metformin 1000 mg twice a day for diabetes management.    MEDICATIONS  Current: Voltaren gel, testosterone, gabapentin, atorvastatin, hydrochlorothiazide, metoprolol, lisinopril, metformin, vitamin B12, vitamin D         Current Outpatient Medications   Medication Instructions    alendronate (FOSAMAX) 70 mg, Oral, Every 7 Days, SUNDAY    aspirin 81 mg, Oral, Daily    atorvastatin (LIPITOR) 10 mg, Oral, Daily    Calcium Carbonate-Vitamin D 600-10 MG-MCG per tablet 2 tablets, Oral, Daily    cetirizine (ZYRTEC) 5 mg, Oral, Daily    cyanocobalamin (VITAMIN B-12) 1,000 mcg, Oral, Daily    Diclofenac Sodium (VOLTAREN) 4 g, Topical, 4 Times Daily PRN    Eliquis 5 mg, Oral,  "2 Times Daily    finasteride (PROSCAR) 5 mg, Oral, Daily    fluticasone (FLONASE) 50 MCG/ACT nasal spray 2 sprays, Nasal, Daily    furosemide (LASIX) 20 mg, Oral, Daily    gabapentin (NEURONTIN) 600 mg, Oral, 3 Times Daily    hydroCHLOROthiazide 12.5 mg, Oral, Daily    lisinopril (PRINIVIL,ZESTRIL) 20 mg, Oral, Daily    metFORMIN (GLUCOPHAGE) 1,000 mg, Oral, 2 Times Daily With Meals    metoprolol succinate XL (TOPROL-XL) 25 mg, Oral, 2 Times Daily    raNITIdine HCl (ZANTAC 150 MAXIMUM STRENGTH PO) 1 tablet, 2 Times Daily    tamsulosin (FLOMAX) 0.4 mg, Oral, Nightly    Testosterone (AndroGel Pump) 20.25 MG/ACT (1.62%) gel 40.5 mg applied once daily in the morning to the shoulders and upper arms       The following portions of the patient's history were reviewed and updated as appropriate: allergies, current medications, past family history, past medical history, past social history, past surgical history, and problem list.    Objective   Vital Signs:   /74   Pulse 85   Temp 97.7 °F (36.5 °C) (Oral)   Ht 167.6 cm (66\")   Wt 94.3 kg (207 lb 14.4 oz)   SpO2 97%   BMI 33.56 kg/m²     BP Readings from Last 3 Encounters:   03/25/25 126/74   02/11/25 122/70   02/07/25 128/55     Wt Readings from Last 3 Encounters:   03/25/25 94.3 kg (207 lb 14.4 oz)   02/11/25 96.1 kg (211 lb 14.4 oz)   02/07/25 93 kg (205 lb 0.4 oz)           Physical Exam  Vitals reviewed.   Constitutional:       Appearance: Normal appearance.   HENT:      Head: Normocephalic and atraumatic.      Right Ear: External ear normal.      Left Ear: External ear normal.   Eyes:      Conjunctiva/sclera: Conjunctivae normal.   Cardiovascular:      Rate and Rhythm: Normal rate and regular rhythm.      Heart sounds: No murmur heard.     No friction rub. No gallop.   Pulmonary:      Effort: Pulmonary effort is normal.      Breath sounds: Normal breath sounds. No wheezing or rhonchi.   Abdominal:      General: Bowel sounds are normal. There is no " distension.      Palpations: Abdomen is soft.      Tenderness: There is no abdominal tenderness.   Musculoskeletal:      Right lower leg: Edema present.      Left lower leg: Edema present.   Skin:     General: Skin is warm and dry.   Neurological:      Mental Status: He is alert and oriented to person, place, and time.      Cranial Nerves: No cranial nerve deficit.   Psychiatric:         Mood and Affect: Mood and affect normal.         Behavior: Behavior normal.         Thought Content: Thought content normal.         Judgment: Judgment normal.            Result Review :   The following data was reviewed by: Ok Moore DO on 03/25/2025:  Common labs          4/23/2024    09:00 4/23/2024    11:29 12/3/2024    09:33 2/7/2025    14:51   Common Labs   Glucose 114   109  106    BUN 15   15  16    Creatinine 1.18   1.10  1.00    Sodium 141   139  139    Potassium 4.6   4.3  3.8    Chloride 105   103  101    Calcium 10.3   10.4  10.4    Albumin 3.9   4.1  3.8    Total Bilirubin 0.6   0.8  1.5    Alkaline Phosphatase 69   82  71    AST (SGOT) 18   17  12    ALT (SGPT) 23   22  13    WBC  7.53  6.72  10.92    Hemoglobin  15.0  15.3  14.4    Hematocrit  43.5  43.2  41.8    Platelets  208  200  188    Total Cholesterol 116   118     Triglycerides 240   168     HDL Cholesterol 27   27     LDL Cholesterol  51   62     Hemoglobin A1C 5.80   5.60     PSA 2.510                Lab Results   Component Value Date    COVID19 Not Detected 08/16/2021    INR 1.03 (L) 08/16/2021    BILIRUBINUR Negative 07/02/2021       Results  Laboratory Studies  Testosterone levels were a little bit low. Kidney function looked good. Blood counts look good. A1c was normal. LDL at 62. Vitamin B12 and vitamin D levels looked good.    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    Procedures        Assessment and Plan    Diagnoses and all orders  for this visit:    1. Hypogonadism in male (Primary)  -     Testosterone (AndroGel Pump) 20.25 MG/ACT (1.62%) gel; 40.5 mg applied once daily in the morning to the shoulders and upper arms  Dispense: 75 g; Refill: 2  -     PSA DIAGNOSTIC; Future  -     Testosterone; Future    2. Chronic bilateral low back pain without sciatica  -     gabapentin (NEURONTIN) 600 MG tablet; Take 1 tablet by mouth 3 (Three) Times a Day.  Dispense: 90 tablet; Refill: 2    3. Acute pain of left knee    4. Bilateral lower extremity edema    5. Valvular incompetence    6. Mixed hyperlipidemia    7. Vitamin D deficiency  -     Vitamin D,25-Hydroxy; Future    8. B12 deficiency  -     Vitamin B12; Future    9. Essential hypertension  -     Comprehensive Metabolic Panel; Future  -     CBC & Differential; Future    10. Type 2 diabetes mellitus with hyperglycemia, without long-term current use of insulin  -     Hemoglobin A1c; Future  -     Microalbumin / Creatinine Urine Ratio - Urine, Clean Catch; Future    11. Medication monitoring encounter  -     PSA DIAGNOSTIC; Future  -     Testosterone; Future  -     Drug Screen 10 With / Conf, WB; Future    12. Screening for prostate cancer  -     PSA DIAGNOSTIC; Future    13. Disorder of prostate, unspecified  -     PSA DIAGNOSTIC; Future    Other orders  -     Diclofenac Sodium (Voltaren) 1 % gel gel; Apply 4 g topically to the appropriate area as directed 4 (Four) Times a Day As Needed (moderate pain).  Dispense: 300 g; Refill: 3  -     furosemide (Lasix) 20 MG tablet; Take 1 tablet by mouth Daily for 10 days.  Dispense: 10 tablet; Refill: 0  -     aspirin 81 MG EC tablet; Take 1 tablet by mouth Daily.  Dispense: 90 tablet; Refill: 3  -     Eliquis 5 MG tablet tablet; Take 1 tablet by mouth 2 (Two) Times a Day.  Dispense: 180 tablet; Refill: 3  -     metFORMIN (GLUCOPHAGE) 1000 MG tablet; Take 1 tablet by mouth 2 (Two) Times a Day With Meals.  Dispense: 180 tablet; Refill: 3  -     cetirizine (zyrTEC)  10 MG tablet; Take 0.5 tablets by mouth Daily.  Dispense: 45 tablet; Refill: 3  -     atorvastatin (LIPITOR) 10 MG tablet; Take 1 tablet by mouth Daily.  Dispense: 90 tablet; Refill: 3  -     lisinopril (PRINIVIL,ZESTRIL) 20 MG tablet; Take 1 tablet by mouth Daily.  Dispense: 90 tablet; Refill: 3  -     metoprolol succinate XL (TOPROL-XL) 25 MG 24 hr tablet; Take 1 tablet by mouth 2 (Two) Times a Day.  Dispense: 180 tablet; Refill: 3  -     hydroCHLOROthiazide 12.5 MG tablet; Take 1 tablet by mouth Daily.  Dispense: 90 tablet; Refill: 3  -     alendronate (FOSAMAX) 70 MG tablet; Take 1 tablet by mouth Every 7 (Seven) Days. SUNDAY  Dispense: 13 tablet; Refill: 3  -     tamsulosin (FLOMAX) 0.4 MG capsule 24 hr capsule; Take 1 capsule by mouth Every Night.  Dispense: 90 capsule; Refill: 3  -     finasteride (PROSCAR) 5 MG tablet; Take 1 tablet by mouth Daily.  Dispense: 90 tablet; Refill: 3  -     cyanocobalamin (VITAMIN B-12) 1000 MCG tablet; Take 1 tablet by mouth Daily.  Dispense: 90 tablet; Refill: 3  -     Calcium Carbonate-Vitamin D 600-10 MG-MCG per tablet; Take 2 tablets by mouth Daily.  Dispense: 180 tablet; Refill: 3  -     fluticasone (FLONASE) 50 MCG/ACT nasal spray; Administer 2 sprays into the nostril(s) as directed by provider Daily.  Dispense: 48 g; Refill: 3        Assessment & Plan  1. Chronic low back pain.  He will continue his current regimen of gabapentin 600 mg 3 times a day. A prescription refill for gabapentin has been sent to [facility].    2. Hypogonadism.  He will maintain his current treatment plan of testosterone 40.5 mg applied once in the morning to the arms and shoulders. A prescription refill for testosterone has been sent to [facility]. His testosterone levels will be monitored, and labs will be repeated during his next visit in a month.    3. Left knee pain.  He reports significant pain in his left knee, which has been ongoing since the last visit. An MRI is scheduled  for 04/07/2025 to further evaluate the condition. He is currently using a knee brace but is not taking any pain medication. He prefers to wait for the MRI results before considering any new pain management options.    4. Bilateral lower extremity edema.  He presents with bilateral lower extremity edema, more pronounced on the left side. A previous venous Doppler indicated deep venous valvular incompetence in the right popliteal area, but no thrombus formation was observed bilaterally. A short-term course of diuretics will be initiated for 10 days to manage the fluid accumulation.  He declines referral to vascular at this time.    5. Diabetes.  His diabetes is well-controlled with his current medication regimen of metformin 1000 mg twice a day. He will continue this regimen.    6. Hyperlipidemia.  His lipid panel shows an LDL level of 62, which is within the desired range. He will continue his daily atorvastatin regimen.    7. Vitamin B12 and D deficiency.  His vitamin B12 and D levels are within the normal range. He will continue his current supplementation regimen.    8. Hypertension.  His blood pressure is well-managed with his current medication regimen of hydrochlorothiazide 12.5 mg daily, metoprolol 25 mg twice a day, and lisinopril 20 mg daily. He will continue this regimen.            See note for indication of controlled substance.  Parag and drug screen have been reviewed.  Controlled substance agreement signed and scanned into chart.  After discussion of risk and benefits of medication, I have determined the patient is suitable for Rx while demonstrating the ability to safely follow and administer the medication plan.  Patient understands expectation that medication directions cannot be adjusted without a providers written approval.    Medications Discontinued During This Encounter   Medication Reason    gabapentin (NEURONTIN) 600 MG tablet Reorder    Testosterone (AndroGel Pump) 20.25 MG/ACT (1.62%) gel  Reorder    Diclofenac Sodium (Voltaren) 1 % gel gel Reorder    aspirin 81 MG EC tablet Reorder    Eliquis 5 MG tablet tablet Reorder    metFORMIN (GLUCOPHAGE) 1000 MG tablet Reorder    cetirizine (zyrTEC) 10 MG tablet Reorder    atorvastatin (LIPITOR) 10 MG tablet Reorder    lisinopril (PRINIVIL,ZESTRIL) 20 MG tablet Reorder    metoprolol succinate XL (TOPROL-XL) 25 MG 24 hr tablet Reorder    hydroCHLOROthiazide 12.5 MG tablet Reorder    cyanocobalamin (VITAMIN B-12) 1000 MCG tablet Reorder    Calcium Carbonate-Vitamin D 600-10 MG-MCG per tablet Reorder    alendronate (FOSAMAX) 70 MG tablet Reorder    finasteride (PROSCAR) 5 MG tablet Reorder    tamsulosin (FLOMAX) 0.4 MG capsule 24 hr capsule Reorder    fluticasone (FLONASE) 50 MCG/ACT nasal spray Reorder          Follow Up   No follow-ups on file.  Patient was given instructions and counseling regarding his condition or for health maintenance advice. Please see specific information pulled into the AVS if appropriate.     Patient or patient representative verbalized consent for the use of Ambient Listening during the visit with  Ok Moore DO for chart documentation. 3/25/2025  16:10 EDT    Ok Moore DO  03/25/25  16:37 EDT

## 2025-04-07 ENCOUNTER — HOSPITAL ENCOUNTER (OUTPATIENT)
Dept: MRI IMAGING | Facility: HOSPITAL | Age: 85
Discharge: HOME | End: 2025-04-07
Admitting: FAMILY MEDICINE
Payer: MEDICARE

## 2025-04-07 DIAGNOSIS — M25.562 ACUTE PAIN OF LEFT KNEE: ICD-10-CM

## 2025-04-07 PROCEDURE — 73721 MRI JNT OF LWR EXTRE W/O DYE: CPT

## 2025-04-12 NOTE — PROGRESS NOTES
[facility]  Cardiology progress Note    Patient Name: Steve Bryson Jr.  : 1940    CHIEF COMPLAINT  Hypertension        Subjective   Subjective     HISTORY OF PRESENT ILLNESS    Steve Bryson Jr. is a 84 y.o. male with history of hypertension and atrial fibrillation.  No chest pain.    REVIEW OF SYSTEMS    Constitutional:    No fever, no weight loss  Skin:     No rash  Otolaryngeal:    No difficulty swallowing  Cardiovascular: See HPI.  Pulmonary:    No cough, no sputum production    Personal History     Social History:    reports that he quit smoking about 44 years ago. His smoking use included cigarettes. He started smoking about 64 years ago. He has a 40 pack-year smoking history. He has been exposed to tobacco smoke. He has never used smokeless tobacco. He reports that he does not currently use alcohol. He reports that he does not use drugs.    Home Medications:  Current Outpatient Medications on File Prior to Visit   Medication Sig    alendronate (FOSAMAX) 70 MG tablet Take 1 tablet by mouth Every 7 (Seven) Days.     aspirin 81 MG EC tablet Take 1 tablet by mouth Daily.    atorvastatin (LIPITOR) 10 MG tablet Take 1 tablet by mouth Daily.    Calcium Carbonate-Vitamin D 600-10 MG-MCG per tablet Take 2 tablets by mouth Daily.    cetirizine (zyrTEC) 10 MG tablet Take 0.5 tablets by mouth Daily.    cyanocobalamin (VITAMIN B-12) 1000 MCG tablet Take 1 tablet by mouth Daily.    Diclofenac Sodium (Voltaren) 1 % gel gel Apply 4 g topically to the appropriate area as directed 4 (Four) Times a Day As Needed (moderate pain).    Eliquis 5 MG tablet tablet Take 1 tablet by mouth 2 (Two) Times a Day.    finasteride (PROSCAR) 5 MG tablet Take 1 tablet by mouth Daily.    fluticasone (FLONASE) 50 MCG/ACT nasal spray Administer 2 sprays into the nostril(s) as directed by provider Daily.    furosemide (Lasix) 20 MG tablet Take 1 tablet by mouth Daily for 10 days.    gabapentin (NEURONTIN)  600 MG tablet Take 1 tablet by mouth 3 (Three) Times a Day.    hydroCHLOROthiazide 12.5 MG tablet Take 1 tablet by mouth Daily.    lisinopril (PRINIVIL,ZESTRIL) 20 MG tablet Take 1 tablet by mouth Daily.    metFORMIN (GLUCOPHAGE) 1000 MG tablet Take 1 tablet by mouth 2 (Two) Times a Day With Meals.    metoprolol succinate XL (TOPROL-XL) 25 MG 24 hr tablet Take 1 tablet by mouth 2 (Two) Times a Day.    raNITIdine HCl (ZANTAC 150 MAXIMUM STRENGTH PO) Take 1 tablet by mouth 2 (Two) Times a Day.    tamsulosin (FLOMAX) 0.4 MG capsule 24 hr capsule Take 1 capsule by mouth Every Night.    Testosterone (AndroGel Pump) 20.25 MG/ACT (1.62%) gel 40.5 mg applied once daily in the morning to the shoulders and upper arms     No current facility-administered medications on file prior to visit.       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     PHYSICAL EXAM:    General Appearance:   well developed  well nourished  HENT:   oropharynx moist  lips not cyanotic  Neck:  thyroid not enlarged  supple  Respiratory:  no respiratory distress  normal breath sounds  no rales  Cardiovascular:  no jugular venous distention  regular rhythm  apical impulse normal  S1 normal, S2 normal  no S3, no S4   no murmur  no rub, no thrill  carotid pulses normal; no bruit  pedal pulses normal  lower extremity edema: none    Skin:   warm, dry  Psychiatric:  judgement and insight appropriate  normal mood and affect        Result Review:  I have personally reviewed the  available results from  [x]  Laboratory  [x]  EKG  [x]  Cardiology  [x]  Medications  [x]  Old records  []  Other:       ECG 12 Lead    Date/Time: 4/15/2025 11:32 AM  Performed by: Brian Muniz MD    Authorized by: Brina Muniz MD  Comparison: compared with previous ECG   Rhythm: atrial fibrillation  Rate: normal  QRS axis: normal  Other findings: non-specific ST-T wave changes    Clinical impression: abnormal EKG        Lab Results   Component Value Date    CHOL 118 12/03/2024    CHOL 116 04/23/2024    CHOL 126 04/07/2023     Lab Results   Component Value Date    TRIG 168 (H) 12/03/2024    TRIG 240 (H) 04/23/2024    TRIG 232 (H) 04/07/2023     Lab Results   Component Value Date    HDL 27 (L) 12/03/2024    HDL 27 (L) 04/23/2024    HDL 28 (L) 04/07/2023     Lab Results   Component Value Date    LDL 62 12/03/2024    LDL 51 04/23/2024    LDL 60 04/07/2023     Lab Results   Component Value Date    VLDL 29 12/03/2024    VLDL 38 04/23/2024    VLDL 38 04/07/2023     Results for orders placed during the hospital encounter of 07/02/21    Adult Transthoracic Echo Complete W/ Cont if Necessary Per Protocol    Interpretation Summary  1.  Normal left ventricular systolic function.  2.  Fibrocalcific mitral and aortic valves.  3.  Mild tricuspid regurgitation and trace mitral regurgitation.  4.  Trace aortic regurgitation.  5.  Biatrial enlargement noted.     Impression/Plan:  1.  Essential hypertension controlled: Continue lisinopril 20 mg once a day.  Continue Toprol-XL 25 mg twice a day.  Monitor blood pressure regularly.  2.  Mixed hyperlipidemia: Continue Lipitor 10 mg once a day.  Monitor lipid and hepatic profile.  3.  Paroxysmal atrial fibrillation: Continue Eliquis 5 mg twice a day for stroke prevention.  Continue Toprol-XL 25 mg twice a day for rate control.              Brian Muniz MD   04/12/25   11:52 EDT

## 2025-04-15 ENCOUNTER — OFFICE VISIT (OUTPATIENT)
Dept: CARDIOLOGY | Facility: CLINIC | Age: 85
End: 2025-04-15
Payer: MEDICARE

## 2025-04-15 VITALS
HEIGHT: 66 IN | BODY MASS INDEX: 33.2 KG/M2 | WEIGHT: 206.6 LBS | DIASTOLIC BLOOD PRESSURE: 67 MMHG | HEART RATE: 58 BPM | SYSTOLIC BLOOD PRESSURE: 113 MMHG

## 2025-04-15 DIAGNOSIS — E78.2 HYPERLIPEMIA, MIXED: ICD-10-CM

## 2025-04-15 DIAGNOSIS — I10 HYPERTENSION, ESSENTIAL: Primary | ICD-10-CM

## 2025-04-15 DIAGNOSIS — I48.0 PAROXYSMAL ATRIAL FIBRILLATION: ICD-10-CM

## 2025-04-15 PROCEDURE — 99214 OFFICE O/P EST MOD 30 MIN: CPT | Performed by: SPECIALIST

## 2025-04-15 PROCEDURE — 3074F SYST BP LT 130 MM HG: CPT | Performed by: SPECIALIST

## 2025-04-15 PROCEDURE — 1160F RVW MEDS BY RX/DR IN RCRD: CPT | Performed by: SPECIALIST

## 2025-04-15 PROCEDURE — 93000 ELECTROCARDIOGRAM COMPLETE: CPT | Performed by: SPECIALIST

## 2025-04-15 PROCEDURE — 3078F DIAST BP <80 MM HG: CPT | Performed by: SPECIALIST

## 2025-04-15 PROCEDURE — 1159F MED LIST DOCD IN RCRD: CPT | Performed by: SPECIALIST

## 2025-04-17 ENCOUNTER — OFFICE VISIT (OUTPATIENT)
Dept: ORTHOPEDIC SURGERY | Facility: CLINIC | Age: 85
End: 2025-04-17
Payer: MEDICARE

## 2025-04-17 VITALS
SYSTOLIC BLOOD PRESSURE: 131 MMHG | HEIGHT: 66 IN | DIASTOLIC BLOOD PRESSURE: 72 MMHG | WEIGHT: 206 LBS | OXYGEN SATURATION: 96 % | BODY MASS INDEX: 33.11 KG/M2 | HEART RATE: 77 BPM

## 2025-04-17 DIAGNOSIS — S83.232A COMPLEX TEAR OF MEDIAL MENISCUS OF LEFT KNEE, UNSPECIFIED WHETHER OLD OR CURRENT TEAR, INITIAL ENCOUNTER: ICD-10-CM

## 2025-04-17 DIAGNOSIS — M25.562 LEFT KNEE PAIN, UNSPECIFIED CHRONICITY: Primary | ICD-10-CM

## 2025-04-17 DIAGNOSIS — M17.12 OSTEOARTHRITIS OF LEFT KNEE, UNSPECIFIED OSTEOARTHRITIS TYPE: ICD-10-CM

## 2025-04-17 DIAGNOSIS — S83.272A COMPLEX TEAR OF LATERAL MENISCUS OF LEFT KNEE, UNSPECIFIED WHETHER OLD OR CURRENT TEAR, INITIAL ENCOUNTER: ICD-10-CM

## 2025-04-17 NOTE — PROGRESS NOTES
"Chief Complaint  Initial Evaluation of the Left Knee     Subjective      Steve Bryson Jr. presents to [facility] for initial evaluation of the left knee.  He has had pain and swelling a few months ago and had to use a walker.  He now can walk without assistive device but still has pain at times. He went to  on 25 and then was sent to the ED the same day for a doppler that was negative.  He has been using a lidocaine patch that helps with some of the pain. He had X rays and MRI and is here to review.  His pain is deep under the patella.      No Known Allergies     Social History     Socioeconomic History    Marital status:    Tobacco Use    Smoking status: Former     Current packs/day: 0.00     Average packs/day: 2.0 packs/day for 20.0 years (40.0 ttl pk-yrs)     Types: Cigarettes     Start date:      Quit date:      Years since quittin.3     Passive exposure: Past    Smokeless tobacco: Never    Tobacco comments:     14-QUIT 25-30 YEARS AGO   Vaping Use    Vaping status: Never Used   Substance and Sexual Activity    Alcohol use: Not Currently    Drug use: Never    Sexual activity: Defer        I reviewed the patient's chief complaint, history of present illness, review of systems, past medical history, surgical history, family history, social history, medications, and allergy list.     Review of Systems     Constitutional: Denies fevers, chills, weight loss  Cardiovascular: Denies chest pain, shortness of breath  Skin: Denies rashes, acute skin changes  Neurologic: Denies headache, loss of consciousness        Vital Signs:   /72   Pulse 77   Ht 167.6 cm (66\")   Wt 93.4 kg (206 lb)   SpO2 96%   BMI 33.25 kg/m²          Physical Exam  General: Alert. No acute distress    Ortho Exam        LEFT KNEE Flexion 110. Extension -3. Stable to varus/valgus stress. Stable to anterior/posterior drawer. Neurovascularly intact. Pain with Chaz. " Negative Lachman. Positive EHL, FHL, HS and TA. Sensation intact to light touch all 5 nerves of the foot. Ambulates with Antalgic gait. Patella is well tracking. Calf supple, non-tender. Positive tenderness to the medial joint line. Positive tenderness to the lateral joint line. Positive Crepitus. Good strength to hamstrings, quadriceps, dorsiflexors, and plantar flexors.  Knee Extensor Mechanism intact Mild swelling.         Procedures      Imaging Results (Most Recent)       None             Result Review :         MRI Knee Left Without Contrast  Result Date: 4/7/2025  Narrative: MRI KNEE LEFT  WO CONTRAST Date of Exam: 4/7/2025 11:17 AM EDT Indication: acute left knee pain. NKI.  Comparison: Three-view left knee dated 2/7/2025 Technique:  Routine multiplanar/multisequence images of the left knee were obtained without contrast administration. Findings: No fracture or malalignment is identified. Marrow signal appears normal. The cruciate ligaments appear intact. There is a vertical tear exiting the undersurface of the medial meniscal posterior horn. The tear extends into the body more anteriorly. The medial meniscal body is extruded medially. There is a horizontal tear of the lateral meniscal body exiting the undersurface. The medial collateral ligament, lateral collateral ligament complex, patellar retinacula and extensor mechanism appear intact. Mild pretibial and prepatellar soft tissue edema is noted. A small knee joint effusion is noted. No loose body is seen. Grade I-II chondromalacia is noted in the joint. No loose body is seen. There is moderate soft tissue edema around the knee joint. A 1.6 cm popliteal cyst is noted.     Impression: Impression: Tears of the medial and lateral menisci, as above. Mild osteoarthritis. Small knee joint effusion. Moderate soft tissue edema around the knee joint. Electronically Signed: Bang Benjamin MD  4/7/2025 12:32 PM EDT  Workstation ID: [id number]             Assessment and  Plan     Diagnoses and all orders for this visit:    1. Left knee pain, unspecified chronicity (Primary)    2. Osteoarthritis of left knee, unspecified osteoarthritis type    3. Tear of medial meniscus of left knee, unspecified whether old or current tear, initial encounter    4. Tear of lateral meniscus of left knee, unspecified whether old or current tear, initial encounter        Discussed the treatment plan with the patient. I reviewed the MRI results with the patient.     Discussed conservative measures as injections, therapy and anti inflammatory      Use lidocaine patches as needed.  HEP exercises given.        Call or return if worsening symptoms.    Follow Up     If the cream, patches and exercises are not helpful we will consider a left knee steroid injection.       Patient was given instructions and counseling regarding his condition or for health maintenance advice. Please see specific information pulled into the AVS if appropriate.     Scribed for Shila Higginbotham MD by Lexie Giraldo MA.  04/17/25   08:54 EDT    I have personally performed the services described in this document as scribed by the above individual and it is both accurate and complete. Shila Higginbotham MD 04/17/25

## 2025-04-28 ENCOUNTER — RESULTS FOLLOW-UP (OUTPATIENT)
Dept: FAMILY MEDICINE CLINIC | Facility: CLINIC | Age: 85
End: 2025-04-28

## 2025-04-28 ENCOUNTER — OFFICE VISIT (OUTPATIENT)
Dept: FAMILY MEDICINE CLINIC | Facility: CLINIC | Age: 85
End: 2025-04-28
Payer: MEDICARE

## 2025-04-28 VITALS
OXYGEN SATURATION: 99 % | SYSTOLIC BLOOD PRESSURE: 120 MMHG | HEIGHT: 66 IN | TEMPERATURE: 97.7 F | BODY MASS INDEX: 33.62 KG/M2 | WEIGHT: 209.2 LBS | HEART RATE: 60 BPM | DIASTOLIC BLOOD PRESSURE: 62 MMHG

## 2025-04-28 DIAGNOSIS — E55.9 VITAMIN D DEFICIENCY: ICD-10-CM

## 2025-04-28 DIAGNOSIS — E11.65 TYPE 2 DIABETES MELLITUS WITH HYPERGLYCEMIA, WITHOUT LONG-TERM CURRENT USE OF INSULIN: ICD-10-CM

## 2025-04-28 DIAGNOSIS — S83.242S TEAR OF MEDIAL MENISCUS OF LEFT KNEE, UNSPECIFIED TEAR TYPE, UNSPECIFIED WHETHER OLD OR CURRENT TEAR, SEQUELA: Primary | ICD-10-CM

## 2025-04-28 DIAGNOSIS — R60.0 BILATERAL LOWER EXTREMITY EDEMA: ICD-10-CM

## 2025-04-28 DIAGNOSIS — G89.29 CHRONIC BILATERAL LOW BACK PAIN WITHOUT SCIATICA: ICD-10-CM

## 2025-04-28 DIAGNOSIS — Z51.81 MEDICATION MONITORING ENCOUNTER: ICD-10-CM

## 2025-04-28 DIAGNOSIS — E53.8 B12 DEFICIENCY: ICD-10-CM

## 2025-04-28 DIAGNOSIS — E29.1 HYPOGONADISM IN MALE: ICD-10-CM

## 2025-04-28 DIAGNOSIS — E78.2 MIXED HYPERLIPIDEMIA: ICD-10-CM

## 2025-04-28 DIAGNOSIS — I48.0 PAROXYSMAL ATRIAL FIBRILLATION: ICD-10-CM

## 2025-04-28 DIAGNOSIS — M25.562 ACUTE PAIN OF LEFT KNEE: ICD-10-CM

## 2025-04-28 DIAGNOSIS — Z12.5 SCREENING FOR PROSTATE CANCER: ICD-10-CM

## 2025-04-28 DIAGNOSIS — I10 ESSENTIAL HYPERTENSION: ICD-10-CM

## 2025-04-28 DIAGNOSIS — M54.50 CHRONIC BILATERAL LOW BACK PAIN WITHOUT SCIATICA: ICD-10-CM

## 2025-04-28 DIAGNOSIS — N42.9 DISORDER OF PROSTATE, UNSPECIFIED: ICD-10-CM

## 2025-04-28 LAB
25(OH)D3 SERPL-MCNC: 43.1 NG/ML (ref 30–100)
ALBUMIN SERPL-MCNC: 4.1 G/DL (ref 3.5–5.2)
ALBUMIN UR-MCNC: <1.2 MG/DL
ALBUMIN/GLOB SERPL: 1.3 G/DL
ALP SERPL-CCNC: 81 U/L (ref 39–117)
ALT SERPL W P-5'-P-CCNC: 23 U/L (ref 1–41)
ANION GAP SERPL CALCULATED.3IONS-SCNC: 7.9 MMOL/L (ref 5–15)
AST SERPL-CCNC: 23 U/L (ref 1–40)
BASOPHILS # BLD AUTO: 0.08 10*3/MM3 (ref 0–0.2)
BASOPHILS NFR BLD AUTO: 1.5 % (ref 0–1.5)
BILIRUB SERPL-MCNC: 0.7 MG/DL (ref 0–1.2)
BUN SERPL-MCNC: 10 MG/DL (ref 8–23)
BUN/CREAT SERPL: 10 (ref 7–25)
CALCIUM SPEC-SCNC: 10.5 MG/DL (ref 8.6–10.5)
CHLORIDE SERPL-SCNC: 103 MMOL/L (ref 98–107)
CO2 SERPL-SCNC: 29.1 MMOL/L (ref 22–29)
CREAT SERPL-MCNC: 1 MG/DL (ref 0.76–1.27)
CREAT UR-MCNC: 90 MG/DL
DEPRECATED RDW RBC AUTO: 47.8 FL (ref 37–54)
EGFRCR SERPLBLD CKD-EPI 2021: 75.1 ML/MIN/1.73
EOSINOPHIL # BLD AUTO: 0.4 10*3/MM3 (ref 0–0.4)
EOSINOPHIL NFR BLD AUTO: 7.4 % (ref 0.3–6.2)
ERYTHROCYTE [DISTWIDTH] IN BLOOD BY AUTOMATED COUNT: 14 % (ref 12.3–15.4)
GLOBULIN UR ELPH-MCNC: 3.2 GM/DL
GLUCOSE SERPL-MCNC: 109 MG/DL (ref 65–99)
HBA1C MFR BLD: 5.8 % (ref 4.8–5.6)
HCT VFR BLD AUTO: 42 % (ref 37.5–51)
HGB BLD-MCNC: 14.9 G/DL (ref 13–17.7)
IMM GRANULOCYTES # BLD AUTO: 0.01 10*3/MM3 (ref 0–0.05)
IMM GRANULOCYTES NFR BLD AUTO: 0.2 % (ref 0–0.5)
LYMPHOCYTES # BLD AUTO: 1.58 10*3/MM3 (ref 0.7–3.1)
LYMPHOCYTES NFR BLD AUTO: 29.1 % (ref 19.6–45.3)
MCH RBC QN AUTO: 33.3 PG (ref 26.6–33)
MCHC RBC AUTO-ENTMCNC: 35.5 G/DL (ref 31.5–35.7)
MCV RBC AUTO: 94 FL (ref 79–97)
MICROALBUMIN/CREAT UR: NORMAL MG/G{CREAT}
MONOCYTES # BLD AUTO: 0.5 10*3/MM3 (ref 0.1–0.9)
MONOCYTES NFR BLD AUTO: 9.2 % (ref 5–12)
NEUTROPHILS NFR BLD AUTO: 2.86 10*3/MM3 (ref 1.7–7)
NEUTROPHILS NFR BLD AUTO: 52.6 % (ref 42.7–76)
NRBC BLD AUTO-RTO: 0 /100 WBC (ref 0–0.2)
PLATELET # BLD AUTO: 198 10*3/MM3 (ref 140–450)
PMV BLD AUTO: 11.1 FL (ref 6–12)
POTASSIUM SERPL-SCNC: 4.5 MMOL/L (ref 3.5–5.2)
PROT SERPL-MCNC: 7.3 G/DL (ref 6–8.5)
PSA SERPL-MCNC: 2.86 NG/ML (ref 0–4)
RBC # BLD AUTO: 4.47 10*6/MM3 (ref 4.14–5.8)
SODIUM SERPL-SCNC: 140 MMOL/L (ref 136–145)
TESTOST SERPL-MCNC: 540 NG/DL (ref 193–740)
VIT B12 BLD-MCNC: 518 PG/ML (ref 211–946)
WBC NRBC COR # BLD AUTO: 5.43 10*3/MM3 (ref 3.4–10.8)

## 2025-04-28 PROCEDURE — 82570 ASSAY OF URINE CREATININE: CPT | Performed by: FAMILY MEDICINE

## 2025-04-28 PROCEDURE — 80307 DRUG TEST PRSMV CHEM ANLYZR: CPT | Performed by: FAMILY MEDICINE

## 2025-04-28 PROCEDURE — 85025 COMPLETE CBC W/AUTO DIFF WBC: CPT | Performed by: FAMILY MEDICINE

## 2025-04-28 PROCEDURE — 82306 VITAMIN D 25 HYDROXY: CPT | Performed by: FAMILY MEDICINE

## 2025-04-28 PROCEDURE — 80053 COMPREHEN METABOLIC PANEL: CPT | Performed by: FAMILY MEDICINE

## 2025-04-28 PROCEDURE — 83036 HEMOGLOBIN GLYCOSYLATED A1C: CPT | Performed by: FAMILY MEDICINE

## 2025-04-28 PROCEDURE — 82607 VITAMIN B-12: CPT | Performed by: FAMILY MEDICINE

## 2025-04-28 PROCEDURE — 84403 ASSAY OF TOTAL TESTOSTERONE: CPT | Performed by: FAMILY MEDICINE

## 2025-04-28 PROCEDURE — 84153 ASSAY OF PSA TOTAL: CPT | Performed by: FAMILY MEDICINE

## 2025-04-28 PROCEDURE — 82043 UR ALBUMIN QUANTITATIVE: CPT | Performed by: FAMILY MEDICINE

## 2025-04-28 RX ORDER — FUROSEMIDE 20 MG/1
20 TABLET ORAL DAILY PRN
Qty: 30 TABLET | Refills: 0 | Status: SHIPPED | OUTPATIENT
Start: 2025-04-28

## 2025-04-28 NOTE — PROGRESS NOTES
Chief Complaint  Left knee pain    Subjective          Steve Gadiel Bryson Jr. presents to [facility]    Knee Pain          History of Present Illness  The patient is an 84-year-old male who presents today for a follow-up visit.    The chief complaint is knee pain. An MRI of the left knee revealed tears in the medial and lateral menisci, along with swelling. He consulted with Dr. Higginbotham, who recommended conservative measures including injections, physical therapy, anti-inflammatory medications, lidocaine patches, and home exercises. A steroid shot was suggested if these measures are not effective. The patient reports a decrease in knee pain and has chosen to postpone surgical intervention at this time. He has been adhering to the prescribed exercise regimen and received an injection from Dr. Higginbotham, with the understanding that surgery would be the next step if the condition worsens.    He recently consulted with his cardiologist, Dr. Muniz, who found his cardiac status to be stable. He is currently on lisinopril 20 mg daily, metoprolol 25 mg twice daily, Lipitor 10 mg daily for high cholesterol, and Eliquis for stroke prevention. His heart rate was noted to be slightly low today.    Chronic low back pain is managed with gabapentin.    Testosterone therapy for hypogonadism is ongoing, with 40.5 mg applied daily to his shoulders.    B12 and vitamin D supplements are taken regularly.    Leg swelling was previously managed with a 10-day course of Lasix, which was reported to be effective. No current swelling is observed.    Blood pressure is managed with hydrochlorothiazide 12.5 mg daily, lisinopril 20 mg daily, and metoprolol 25 mg twice daily.    Diabetes is well-controlled with an A1c level of 5.6%. Metformin 1000 mg is taken twice daily.         Current Outpatient Medications   Medication Instructions    alendronate (FOSAMAX) 70 mg, Oral, Every 7 Days, SUNDAY    aspirin 81 mg,  "Oral, Daily    atorvastatin (LIPITOR) 10 mg, Oral, Daily    Calcium Carbonate-Vitamin D 600-10 MG-MCG per tablet 2 tablets, Oral, Daily    cetirizine (ZYRTEC) 5 mg, Oral, Daily    cyanocobalamin (VITAMIN B-12) 1,000 mcg, Oral, Daily    Diclofenac Sodium (VOLTAREN) 4 g, Topical, 4 Times Daily PRN    Eliquis 5 mg, Oral, 2 Times Daily    finasteride (PROSCAR) 5 mg, Oral, Daily    fluticasone (FLONASE) 50 MCG/ACT nasal spray 2 sprays, Nasal, Daily    furosemide (LASIX) 20 mg, Oral, Daily PRN    gabapentin (NEURONTIN) 600 mg, Oral, 3 Times Daily    hydroCHLOROthiazide 12.5 mg, Oral, Daily    lisinopril (PRINIVIL,ZESTRIL) 20 mg, Oral, Daily    metFORMIN (GLUCOPHAGE) 1,000 mg, Oral, 2 Times Daily With Meals    metoprolol succinate XL (TOPROL-XL) 25 mg, Oral, 2 Times Daily    raNITIdine HCl (ZANTAC 150 MAXIMUM STRENGTH PO) 1 tablet, 2 Times Daily    tamsulosin (FLOMAX) 0.4 mg, Oral, Nightly    Testosterone (AndroGel Pump) 20.25 MG/ACT (1.62%) gel 40.5 mg applied once daily in the morning to the shoulders and upper arms       The following portions of the patient's history were reviewed and updated as appropriate: allergies, current medications, past family history, past medical history, past social history, past surgical history, and problem list.    Objective   Vital Signs:   /62   Pulse 60   Temp 97.7 °F (36.5 °C) (Oral)   Ht 167.6 cm (66\")   Wt 94.9 kg (209 lb 3.2 oz)   SpO2 99%   BMI 33.77 kg/m²     BP Readings from Last 3 Encounters:   04/28/25 120/62   04/17/25 131/72   04/15/25 113/67     Wt Readings from Last 3 Encounters:   04/28/25 94.9 kg (209 lb 3.2 oz)   04/17/25 93.4 kg (206 lb)   04/15/25 93.7 kg (206 lb 9.6 oz)     BMI is >= 30 and <35. (Class 1 Obesity). The following options were offered after discussion;: exercise counseling/recommendations and nutrition counseling/recommendations     Physical Exam  Vitals reviewed.   Constitutional:       Appearance: Normal appearance.   HENT:      Head: " Normocephalic and atraumatic.      Right Ear: External ear normal.      Left Ear: External ear normal.   Eyes:      Conjunctiva/sclera: Conjunctivae normal.   Cardiovascular:      Rate and Rhythm: Normal rate and regular rhythm.      Heart sounds: No murmur heard.     No friction rub. No gallop.   Pulmonary:      Effort: Pulmonary effort is normal.      Breath sounds: Normal breath sounds. No wheezing or rhonchi.   Abdominal:      General: Bowel sounds are normal. There is no distension.      Palpations: Abdomen is soft.      Tenderness: There is no abdominal tenderness.   Musculoskeletal:      Right lower leg: No edema.      Left lower leg: No edema.   Skin:     General: Skin is warm and dry.   Neurological:      Mental Status: He is alert and oriented to person, place, and time.      Cranial Nerves: No cranial nerve deficit.   Psychiatric:         Mood and Affect: Mood and affect normal.         Behavior: Behavior normal.         Thought Content: Thought content normal.         Judgment: Judgment normal.            Result Review :   The following data was reviewed by: Ok Moore DO on 04/28/2025:  Common labs          12/3/2024    09:33 2/7/2025    14:51   Common Labs   Glucose 109  106    BUN 15  16    Creatinine 1.10  1.00    Sodium 139  139    Potassium 4.3  3.8    Chloride 103  101    Calcium 10.4  10.4    Albumin 4.1  3.8    Total Bilirubin 0.8  1.5    Alkaline Phosphatase 82  71    AST (SGOT) 17  12    ALT (SGPT) 22  13    WBC 6.72  10.92    Hemoglobin 15.3  14.4    Hematocrit 43.2  41.8    Platelets 200  188    Total Cholesterol 118     Triglycerides 168     HDL Cholesterol 27     LDL Cholesterol  62     Hemoglobin A1C 5.60              Lab Results   Component Value Date    COVID19 Not Detected 08/16/2021    INR 1.03 (L) 08/16/2021    BILIRUBINUR Negative 07/02/2021       Results  Labs   - A1c: 5.6%    Imaging   - MRI of the left knee: Tears in the medial and lateral menisci and some swelling   -  Doppler of the right leg: Deep valvular venous valvular incompetence but no thrombus formation observed    Procedures        Assessment and Plan    Diagnoses and all orders for this visit:    1. Tear of medial meniscus of left knee, unspecified tear type, unspecified whether old or current tear, sequela (Primary)    2. Acute pain of left knee    3. Medication monitoring encounter    4. Essential hypertension    5. Mixed hyperlipidemia    6. Paroxysmal atrial fibrillation    7. Chronic bilateral low back pain without sciatica    8. Hypogonadism in male    9. B12 deficiency    10. Vitamin D deficiency    11. Bilateral lower extremity edema    12. Type 2 diabetes mellitus with hyperglycemia, without long-term current use of insulin    Other orders  -     furosemide (Lasix) 20 MG tablet; Take 1 tablet by mouth Daily As Needed (leg swelling).  Dispense: 30 tablet; Refill: 0        Assessment & Plan  1. Left knee pain.  - MRI revealed tears in the medial and lateral menisci with associated swelling.  - Conservative measures recommended by Dr. Higginbotham include physical therapy, anti-inflammatory medications, lidocaine patches, and home exercises.  - If conservative measures are ineffective, a steroid injection may be considered.  - Surgery will be considered if the knee flares up again despite these measures.    2. Chronic low back pain.  - Continues to take gabapentin for chronic low back pain.  - No new symptoms or changes in pain reported.  - Effectiveness of gabapentin appears stable.  - No additional treatment changes discussed.    3. Hypogonadism.  - Currently on testosterone therapy, applying 40.5 mg (2 pumps) once a day to shoulders.  - Labs will be checked today to monitor testosterone levels.  - No new symptoms or side effects reported.  - Continuation of current testosterone therapy regimen.    4. Leg swelling.  - Leg swelling resolved following a 10-day course of Lasix.  - Physical examination shows no current  swelling.  - Doppler indicated deep venous valvular incompetence on the right side but no thrombus formation.  - Prescription for Lasix 30 tablets will be provided for potential future episodes of leg swelling.    5. Blood pressure management.  - Currently on hydrochlorothiazide 12.5 mg daily, lisinopril 20 mg daily, and metoprolol 25 mg twice a day.  - Heart rate today was 60 bpm, slightly low but within acceptable limits.  - No new symptoms or side effects reported.  - Continuation of current blood pressure management regimen.    6. Diabetes mellitus.  - Last A1c level was 5.6%, indicating good glycemic control.  - Currently on metformin 1000 mg twice a day.  - Labs will be checked today to monitor diabetes.  - No new symptoms or changes in glycemic control reported.    7. Hyperlipidemia.  - Taking Lipitor 10 mg daily for high cholesterol.  - No new symptoms or side effects reported.  - Continuation of current hyperlipidemia management regimen.  - Labs will be checked today to monitor lipid levels.    8. Stroke prevention.  - On Eliquis for stroke prevention.  - No new symptoms or side effects reported.  - Continuation of current stroke prevention regimen.    Follow-up  - Follow-up appointment scheduled in 3 months (07/2025).       Medications Discontinued During This Encounter   Medication Reason    furosemide (Lasix) 20 MG tablet Reorder          Follow Up   Return in about 3 months (around 7/28/2025) for hypertension.  Patient was given instructions and counseling regarding his condition or for health maintenance advice. Please see specific information pulled into the AVS if appropriate.     Patient or patient representative verbalized consent for the use of Ambient Listening during the visit with  Ok Moore DO for chart documentation. 4/28/2025  09:34 EDT    Ok Moore DO  04/28/25  09:43 EDT

## 2025-05-02 LAB
AMPHETAMINES BLD QL SCN: NEGATIVE NG/ML
BARBITURATES SERPLBLD QL: NEGATIVE UG/ML
BENZODIAZ BLD QL: NEGATIVE NG/ML
CANNABINOIDS BLD QL SCN: NEGATIVE NG/ML
COCAINE+BZE SERPLBLD QL SCN: NEGATIVE NG/ML
METHADONE BLD QL SCN: NEGATIVE NG/ML
OPIATES BLD QL SCN: NEGATIVE NG/ML
OXYCODONE BLD QL SCN: NEGATIVE NG/ML
PCP BLD QL SCN: NEGATIVE NG/ML
PROPOXYPH BLD QL SCN: NEGATIVE NG/ML

## 2025-07-21 DIAGNOSIS — M54.50 CHRONIC BILATERAL LOW BACK PAIN WITHOUT SCIATICA: ICD-10-CM

## 2025-07-21 DIAGNOSIS — G89.29 CHRONIC BILATERAL LOW BACK PAIN WITHOUT SCIATICA: ICD-10-CM

## 2025-07-21 DIAGNOSIS — E29.1 HYPOGONADISM IN MALE: ICD-10-CM

## 2025-07-21 RX ORDER — TESTOSTERONE 1.62 MG/G
GEL TRANSDERMAL
Qty: 75 G | Refills: 2 | Status: SHIPPED | OUTPATIENT
Start: 2025-07-21

## 2025-07-21 RX ORDER — GABAPENTIN 600 MG/1
600 TABLET ORAL 3 TIMES DAILY
Qty: 90 TABLET | Refills: 2 | Status: SHIPPED | OUTPATIENT
Start: 2025-07-21

## 2025-07-21 NOTE — TELEPHONE ENCOUNTER
Caller: BraydonMelinda mar    Relationship: Emergency Contact    Best call back number: [phone]    Requested Prescriptions:   Requested Prescriptions     Pending Prescriptions Disp Refills    gabapentin (NEURONTIN) 600 MG tablet 90 tablet 2     Sig: Take 1 tablet by mouth 3 (Three) Times a Day.    Testosterone (AndroGel Pump) 20.25 MG/ACT (1.62%) gel 75 g 2     Si.5 mg applied once daily in the morning to the shoulders and upper arms        Pharmacy where request should be sent: [facility] [phone] [place], [place] [id number]      Last office visit with prescribing clinician: 2025   Last telemedicine visit with prescribing clinician: Visit date not found   Next office visit with prescribing clinician: 2025     Additional details provided by patient:     Does the patient have less than a 3 day supply:  [x] Yes  [] No    Would you like a call back once the refill request has been completed: [] Yes [] No    If the office needs to give you a call back, can they leave a voicemail: [] Yes [] No    Jaun Plascencia Rep   25 09:19 EDT

## 2025-07-29 ENCOUNTER — OFFICE VISIT (OUTPATIENT)
Dept: FAMILY MEDICINE CLINIC | Facility: CLINIC | Age: 85
End: 2025-07-29
Payer: MEDICARE

## 2025-07-29 VITALS
OXYGEN SATURATION: 98 % | HEIGHT: 66 IN | TEMPERATURE: 97.6 F | HEART RATE: 50 BPM | BODY MASS INDEX: 33.59 KG/M2 | SYSTOLIC BLOOD PRESSURE: 128 MMHG | DIASTOLIC BLOOD PRESSURE: 86 MMHG | WEIGHT: 209 LBS

## 2025-07-29 DIAGNOSIS — Z87.81 HISTORY OF VERTEBRAL COMPRESSION FRACTURE: ICD-10-CM

## 2025-07-29 DIAGNOSIS — E78.2 MIXED HYPERLIPIDEMIA: ICD-10-CM

## 2025-07-29 DIAGNOSIS — R73.03 PREDIABETES: ICD-10-CM

## 2025-07-29 DIAGNOSIS — M54.50 CHRONIC BILATERAL LOW BACK PAIN WITHOUT SCIATICA: ICD-10-CM

## 2025-07-29 DIAGNOSIS — G89.29 CHRONIC BILATERAL LOW BACK PAIN WITHOUT SCIATICA: ICD-10-CM

## 2025-07-29 DIAGNOSIS — E55.9 VITAMIN D DEFICIENCY: ICD-10-CM

## 2025-07-29 DIAGNOSIS — E29.1 HYPOGONADISM IN MALE: ICD-10-CM

## 2025-07-29 DIAGNOSIS — I48.0 PAROXYSMAL ATRIAL FIBRILLATION: Primary | ICD-10-CM

## 2025-07-29 DIAGNOSIS — R97.20 RISING PSA LEVEL: ICD-10-CM

## 2025-07-29 DIAGNOSIS — M81.0 OSTEOPOROSIS, UNSPECIFIED OSTEOPOROSIS TYPE, UNSPECIFIED PATHOLOGICAL FRACTURE PRESENCE: ICD-10-CM

## 2025-07-29 DIAGNOSIS — I10 ESSENTIAL HYPERTENSION: ICD-10-CM

## 2025-07-29 DIAGNOSIS — S83.242S TEAR OF MEDIAL MENISCUS OF LEFT KNEE, UNSPECIFIED TEAR TYPE, UNSPECIFIED WHETHER OLD OR CURRENT TEAR, SEQUELA: ICD-10-CM

## 2025-07-29 DIAGNOSIS — E53.8 B12 DEFICIENCY: ICD-10-CM

## 2025-07-29 DIAGNOSIS — M25.561 CHRONIC PAIN OF RIGHT KNEE: ICD-10-CM

## 2025-07-29 DIAGNOSIS — G89.29 CHRONIC PAIN OF RIGHT KNEE: ICD-10-CM

## 2025-07-29 PROCEDURE — G2211 COMPLEX E/M VISIT ADD ON: HCPCS | Performed by: FAMILY MEDICINE

## 2025-07-29 PROCEDURE — 3074F SYST BP LT 130 MM HG: CPT | Performed by: FAMILY MEDICINE

## 2025-07-29 PROCEDURE — 3079F DIAST BP 80-89 MM HG: CPT | Performed by: FAMILY MEDICINE

## 2025-07-29 PROCEDURE — 99214 OFFICE O/P EST MOD 30 MIN: CPT | Performed by: FAMILY MEDICINE

## 2025-07-29 PROCEDURE — 1125F AMNT PAIN NOTED PAIN PRSNT: CPT | Performed by: FAMILY MEDICINE

## 2025-07-29 NOTE — PROGRESS NOTES
Chief Complaint  Check up  Atrial fibrillation    Subjective          Steve Gadiel Bryson  presents to [facility]    History of Present Illness     History of Present Illness  The patient is an 84-year-old male who presents today for a follow-up appointment.    He reports feeling well overall, with no need for medication refills at this time. He experiences fatigue after a full day of work but otherwise feels good. He is currently on Eliquis 5 mg twice daily, lisinopril 20 mg daily, metoprolol 25 mg twice daily, and hydrochlorothiazide 12.5 mg daily for atrial fibrillation and hypertension.    His left knee, previously affected by a tear in the medial and lateral menisci, is now doing well. However, he has been experiencing some discomfort in his right knee, which was treated for arthritis several years ago. Occasionally, he experiences sharp pain in his right knee when it is strained, but this subsides once the knee is straightened. He has recently refilled his Voltaren gel prescription.    He continues to take testosterone 2 pumps 40.5 mg for hypogonadism.    He also takes gabapentin for low back pain and hip discomfort, although he is unsure of its effectiveness as his symptoms seem to be worsening. He experiences pain upon standing from a seated position, which improves with stretching and walking. He does not experience any radiating leg pain. He does not believe his condition has deteriorated since his last visit.    He is still taking atorvastatin 10 mg daily for cholesterol and Fosamax for osteoporosis.    PAST SURGICAL HISTORY:  - Right knee arthritis treatment several years ago  - Kyphoplasty procedure for moderate to severe compression fracture at L4 in 2021         Current Outpatient Medications   Medication Instructions    alendronate (FOSAMAX) 70 mg, Oral, Every 7 Days, SUNDAY    aspirin 81 mg, Oral, Daily    atorvastatin (LIPITOR) 10 mg, Oral, Daily    Calcium  "Carbonate-Vitamin D 600-10 MG-MCG per tablet 2 tablets, Oral, Daily    cetirizine (ZYRTEC) 5 mg, Oral, Daily    cyanocobalamin (VITAMIN B-12) 1,000 mcg, Oral, Daily    Diclofenac Sodium (VOLTAREN) 4 g, Topical, 4 Times Daily PRN    Eliquis 5 mg, Oral, 2 Times Daily    finasteride (PROSCAR) 5 mg, Oral, Daily    fluticasone (FLONASE) 50 MCG/ACT nasal spray 2 sprays, Nasal, Daily    furosemide (LASIX) 20 mg, Oral, Daily PRN    gabapentin (NEURONTIN) 600 mg, Oral, 3 Times Daily    hydroCHLOROthiazide 12.5 mg, Oral, Daily    lisinopril (PRINIVIL,ZESTRIL) 20 mg, Oral, Daily    metFORMIN (GLUCOPHAGE) 1,000 mg, Oral, 2 Times Daily With Meals    metoprolol succinate XL (TOPROL-XL) 25 mg, Oral, 2 Times Daily    raNITIdine HCl (ZANTAC 150 MAXIMUM STRENGTH PO) 1 tablet, 2 Times Daily    tamsulosin (FLOMAX) 0.4 mg, Oral, Nightly    Testosterone (AndroGel Pump) 20.25 MG/ACT (1.62%) gel 40.5 mg applied once daily in the morning to the shoulders and upper arms       The following portions of the patient's history were reviewed and updated as appropriate: allergies, current medications, past family history, past medical history, past social history, past surgical history, and problem list.    Objective   Vital Signs:   /86   Pulse 50   Temp 97.6 °F (36.4 °C) (Oral)   Ht 167.6 cm (66\")   Wt 94.8 kg (209 lb)   SpO2 98%   BMI 33.73 kg/m²     BP Readings from Last 3 Encounters:   07/29/25 128/86   04/28/25 120/62   04/17/25 131/72     Wt Readings from Last 3 Encounters:   07/29/25 94.8 kg (209 lb)   04/28/25 94.9 kg (209 lb 3.2 oz)   04/17/25 93.4 kg (206 lb)           Physical Exam  Vitals reviewed.   Constitutional:       Appearance: Normal appearance.   HENT:      Head: Normocephalic and atraumatic.      Right Ear: External ear normal.      Left Ear: External ear normal.   Eyes:      Conjunctiva/sclera: Conjunctivae normal.   Cardiovascular:      Rate and Rhythm: Normal rate. Rhythm irregular.      Heart sounds: No murmur " heard.     No friction rub. No gallop.   Pulmonary:      Effort: Pulmonary effort is normal.      Breath sounds: Normal breath sounds. No wheezing or rhonchi.   Abdominal:      General: Bowel sounds are normal. There is no distension.      Palpations: Abdomen is soft.      Tenderness: There is no abdominal tenderness.   Skin:     General: Skin is warm and dry.   Neurological:      Mental Status: He is alert and oriented to person, place, and time.      Cranial Nerves: No cranial nerve deficit.   Psychiatric:         Mood and Affect: Mood and affect normal.         Behavior: Behavior normal.         Thought Content: Thought content normal.         Judgment: Judgment normal.            Result Review :   The following data was reviewed by: Ok Moore DO on 07/29/2025:  Common labs          12/3/2024    09:33 2/7/2025    14:51 4/28/2025    10:11   Common Labs   Glucose 109  106  109    BUN 15  16  10    Creatinine 1.10  1.00  1.00    Sodium 139  139  140    Potassium 4.3  3.8  4.5    Chloride 103  101  103    Calcium 10.4  10.4  10.5    Albumin 4.1  3.8  4.1    Total Bilirubin 0.8  1.5  0.7    Alkaline Phosphatase 82  71  81    AST (SGOT) 17  12  23    ALT (SGPT) 22  13  23    WBC 6.72  10.92  5.43    Hemoglobin 15.3  14.4  14.9    Hematocrit 43.2  41.8  42.0    Platelets 200  188  198    Total Cholesterol 118      Triglycerides 168      HDL Cholesterol 27      LDL Cholesterol  62      Hemoglobin A1C 5.60   5.80    Microalbumin, Urine   <1.2    PSA   2.860             Lab Results   Component Value Date    COVID19 Not Detected 08/16/2021    INR 1.03 (L) 08/16/2021    BILIRUBINUR Negative 07/02/2021       Results  Labs   - A1c: 5.8%   - Vitamin D level: Normal   - Drug test: Normal   - Vitamin B12 levels: Normal   - Blood counts: Normal   - Kidney function: Normal   - Liver enzymes: Normal   - Testosterone level: 540   - PSA level: 2.8    Procedures        Assessment and Plan    Diagnoses and all orders for this  visit:    1. Paroxysmal atrial fibrillation (Primary)    2. Essential hypertension  -     CBC & Differential; Future  -     Comprehensive Metabolic Panel; Future  -     Lipid Panel; Future    3. Tear of medial meniscus of left knee, unspecified tear type, unspecified whether old or current tear, sequela    4. Chronic pain of right knee    5. Hypogonadism in male  -     Testosterone; Future  -     PSA DIAGNOSTIC; Future    6. Prediabetes  -     Hemoglobin A1c; Future    7. B12 deficiency  -     Vitamin B12; Future    8. Chronic bilateral low back pain without sciatica    9. History of vertebral compression fracture    10. Mixed hyperlipidemia  -     Lipid Panel; Future    11. Osteoporosis, unspecified osteoporosis type, unspecified pathological fracture presence    12. Vitamin D deficiency  -     Vitamin D,25-Hydroxy; Future    13. Rising PSA level  -     PSA DIAGNOSTIC; Future        Assessment & Plan  1. Atrial Fibrillation.  - Heart rate was noted to be 50 bpm today.  - Currently taking Eliquis 5 mg twice a day, lisinopril 20 mg daily, metoprolol 25 mg twice a day, and hydrochlorothiazide 12.5 mg daily.  - No changes to his medication regimen are necessary at this time.  - Will continue with his current medications.    2. Hypertension.  - Currently taking lisinopril 20 mg daily, metoprolol 25 mg twice a day, and hydrochlorothiazide 12.5 mg daily.  - Blood pressure management is stable.  - No changes to his medication regimen are necessary at this time.  - Will continue with his current medications.    3. Left Knee Pain.  - Previously had a tear of the medial and lateral menisci in his left knee.  - Saw Dr. Higginbotham, who recommended conservative measures.  - Left knee is currently doing fine.  - No additional treatment required at this time.    4. Right Knee Pain.  - Reports occasional sharp pain in his right knee, which resolves when he straightens it.  - Has Voltaren gel available for use as needed.  - No x-ray  requested at this time.  - Will monitor symptoms and use Voltaren gel as needed.    5. Low Testosterone (Hypogonadism).  - Currently taking testosterone 2 pumps (40.5 mg) daily.  - Testosterone level was 540 ng/dL, which is within the normal range.  - Will continue with his current dosage.  - Labs to be repeated in 3 months.    6. Prediabetes.  - A1c was 5.8%, indicating a prediabetic state.  - Advised to reduce intake of carbohydrates and sugars.  - Will continue to monitor A1c levels.  - Labs to be repeated in 3 months.    7. Low Back Pain.  - Reports lower back pain and hip discomfort, which subsides after stretching and walking.  - Manages pain with gabapentin.  - Physical therapy was discussed but not pursued at this time.  - Advised to continue stretching and practicing good lifting techniques.    8. Hyperlipidemia.  - Currently taking atorvastatin 10 mg daily for cholesterol management.  - Lipid levels are stable.  - Will continue with his current dosage.  - Labs to be repeated in 3 months.    9. Osteoporosis.  - Currently taking Fosamax for osteoporosis.  - Bone health is stable.  - Will continue with his current medication.  - Labs to be repeated in 3 months.    10. Health Maintenance.  - Vitamin D, vitamin B12 levels, blood counts, kidney function, liver enzymes, and PSA levels are all within normal ranges.  - PSA level was 2.8 ng/mL, which is stable but will continue to be monitored.  - Labs including CBC, CMP, B12, vitamin D, lipid, PSA, and testosterone will be repeated in 3 months or around his schedule in late October or early November 2025.    Follow-up: Next scheduled visit in 3 months or around his schedule in late October or early November 2025.       There are no discontinued medications.       Follow Up   Return in about 3 months (around 10/29/2025) for hypogonadism.  Patient was given instructions and counseling regarding his condition or for health maintenance advice. Please see specific  information pulled into the AVS if appropriate.     Patient or patient representative verbalized consent for the use of Ambient Listening during the visit with  Ok Moore DO for chart documentation. 7/29/2025  09:29 EDT    Ok Moore DO  07/29/25  09:40 EDT